# Patient Record
Sex: FEMALE | Race: WHITE | NOT HISPANIC OR LATINO | Employment: OTHER | URBAN - NONMETROPOLITAN AREA
[De-identification: names, ages, dates, MRNs, and addresses within clinical notes are randomized per-mention and may not be internally consistent; named-entity substitution may affect disease eponyms.]

---

## 2017-01-12 ENCOUNTER — COMMUNICATION - GICH (OUTPATIENT)
Dept: INTERNAL MEDICINE | Facility: OTHER | Age: 62
End: 2017-01-12

## 2017-01-12 DIAGNOSIS — I10 ESSENTIAL (PRIMARY) HYPERTENSION: ICD-10-CM

## 2017-05-23 ENCOUNTER — HISTORY (OUTPATIENT)
Dept: INTERNAL MEDICINE | Facility: OTHER | Age: 62
End: 2017-05-23

## 2017-05-23 ENCOUNTER — OFFICE VISIT - GICH (OUTPATIENT)
Dept: INTERNAL MEDICINE | Facility: OTHER | Age: 62
End: 2017-05-23

## 2017-05-23 ENCOUNTER — AMBULATORY - GICH (OUTPATIENT)
Dept: PEDIATRICS | Facility: OTHER | Age: 62
End: 2017-05-23

## 2017-05-23 DIAGNOSIS — M53.3 SACROCOCCYGEAL DISORDERS, NOT ELSEWHERE CLASSIFIED: ICD-10-CM

## 2017-05-23 DIAGNOSIS — E87.6 HYPOKALEMIA: ICD-10-CM

## 2017-05-23 DIAGNOSIS — I10 ESSENTIAL (PRIMARY) HYPERTENSION: ICD-10-CM

## 2017-05-23 DIAGNOSIS — Z78.0 ASYMPTOMATIC MENOPAUSAL STATE: ICD-10-CM

## 2017-05-23 DIAGNOSIS — F41.1 GENERALIZED ANXIETY DISORDER: ICD-10-CM

## 2017-05-23 DIAGNOSIS — G89.29 OTHER CHRONIC PAIN: ICD-10-CM

## 2017-05-23 LAB
ANION GAP - HISTORICAL: 4 (ref 5–18)
BUN SERPL-MCNC: 16 MG/DL (ref 7–25)
BUN/CREAT RATIO - HISTORICAL: 21
CALCIUM SERPL-MCNC: 9.5 MG/DL (ref 8.6–10.3)
CHLORIDE SERPLBLD-SCNC: 103 MMOL/L (ref 98–107)
CO2 SERPL-SCNC: 28 MMOL/L (ref 21–31)
CREAT SERPL-MCNC: 0.76 MG/DL (ref 0.7–1.3)
GFR IF NOT AFRICAN AMERICAN - HISTORICAL: >60 ML/MIN/1.73M2
GLUCOSE SERPL-MCNC: 84 MG/DL (ref 70–105)
POTASSIUM SERPL-SCNC: 3.3 MMOL/L (ref 3.5–5.1)
SODIUM SERPL-SCNC: 135 MMOL/L (ref 133–143)

## 2017-05-23 ASSESSMENT — ANXIETY QUESTIONNAIRES
2. NOT BEING ABLE TO STOP OR CONTROL WORRYING: NOT AT ALL
4. TROUBLE RELAXING: NOT AT ALL
6. BECOMING EASILY ANNOYED OR IRRITABLE: NOT AT ALL
GAD7 TOTAL SCORE: 0
3. WORRYING TOO MUCH ABOUT DIFFERENT THINGS: NOT AT ALL
1. FEELING NERVOUS, ANXIOUS, OR ON EDGE: NOT AT ALL
7. FEELING AFRAID AS IF SOMETHING AWFUL MIGHT HAPPEN: NOT AT ALL
5. BEING SO RESTLESS THAT IT IS HARD TO SIT STILL: NOT AT ALL

## 2017-05-23 ASSESSMENT — PATIENT HEALTH QUESTIONNAIRE - PHQ9: SUM OF ALL RESPONSES TO PHQ QUESTIONS 1-9: 0

## 2017-05-26 ENCOUNTER — AMBULATORY - GICH (OUTPATIENT)
Dept: ONCOLOGY | Facility: OTHER | Age: 62
End: 2017-05-26

## 2017-05-26 ENCOUNTER — HOSPITAL ENCOUNTER (OUTPATIENT)
Dept: RADIOLOGY | Facility: OTHER | Age: 62
End: 2017-05-26
Attending: INTERNAL MEDICINE

## 2017-05-26 ENCOUNTER — AMBULATORY - GICH (OUTPATIENT)
Dept: INTERNAL MEDICINE | Facility: OTHER | Age: 62
End: 2017-05-26

## 2017-05-26 DIAGNOSIS — H10.9 CONJUNCTIVITIS: ICD-10-CM

## 2017-05-26 DIAGNOSIS — Z78.0 ASYMPTOMATIC MENOPAUSAL STATE: ICD-10-CM

## 2017-05-31 ENCOUNTER — AMBULATORY - GICH (OUTPATIENT)
Dept: INTERNAL MEDICINE | Facility: OTHER | Age: 62
End: 2017-05-31

## 2017-06-05 ENCOUNTER — AMBULATORY - GICH (OUTPATIENT)
Dept: INTERNAL MEDICINE | Facility: OTHER | Age: 62
End: 2017-06-05

## 2017-06-05 DIAGNOSIS — M81.0 AGE-RELATED OSTEOPOROSIS WITHOUT CURRENT PATHOLOGICAL FRACTURE: ICD-10-CM

## 2017-06-06 ENCOUNTER — AMBULATORY - GICH (OUTPATIENT)
Dept: INTERNAL MEDICINE | Facility: OTHER | Age: 62
End: 2017-06-06

## 2017-06-07 ENCOUNTER — AMBULATORY - GICH (OUTPATIENT)
Dept: INTERNAL MEDICINE | Facility: OTHER | Age: 62
End: 2017-06-07

## 2017-06-13 ENCOUNTER — AMBULATORY - GICH (OUTPATIENT)
Dept: INTERNAL MEDICINE | Facility: OTHER | Age: 62
End: 2017-06-13

## 2017-06-20 ENCOUNTER — OFFICE VISIT - GICH (OUTPATIENT)
Dept: INTERNAL MEDICINE | Facility: OTHER | Age: 62
End: 2017-06-20

## 2017-06-20 ENCOUNTER — HISTORY (OUTPATIENT)
Dept: INTERNAL MEDICINE | Facility: OTHER | Age: 62
End: 2017-06-20

## 2017-06-20 DIAGNOSIS — F41.1 GENERALIZED ANXIETY DISORDER: ICD-10-CM

## 2017-06-20 DIAGNOSIS — M81.0 AGE-RELATED OSTEOPOROSIS WITHOUT CURRENT PATHOLOGICAL FRACTURE: ICD-10-CM

## 2017-06-20 ASSESSMENT — ANXIETY QUESTIONNAIRES
4. TROUBLE RELAXING: NOT AT ALL
GAD7 TOTAL SCORE: 0
5. BEING SO RESTLESS THAT IT IS HARD TO SIT STILL: NOT AT ALL
7. FEELING AFRAID AS IF SOMETHING AWFUL MIGHT HAPPEN: NOT AT ALL
2. NOT BEING ABLE TO STOP OR CONTROL WORRYING: NOT AT ALL
1. FEELING NERVOUS, ANXIOUS, OR ON EDGE: NOT AT ALL
6. BECOMING EASILY ANNOYED OR IRRITABLE: NOT AT ALL
3. WORRYING TOO MUCH ABOUT DIFFERENT THINGS: NOT AT ALL

## 2017-06-20 ASSESSMENT — PATIENT HEALTH QUESTIONNAIRE - PHQ9: SUM OF ALL RESPONSES TO PHQ QUESTIONS 1-9: 0

## 2017-06-28 ENCOUNTER — AMBULATORY - GICH (OUTPATIENT)
Dept: SCHEDULING | Facility: OTHER | Age: 62
End: 2017-06-28

## 2017-07-24 ENCOUNTER — COMMUNICATION - GICH (OUTPATIENT)
Dept: INTERNAL MEDICINE | Facility: OTHER | Age: 62
End: 2017-07-24

## 2017-07-28 ENCOUNTER — AMBULATORY - GICH (OUTPATIENT)
Dept: SCHEDULING | Facility: OTHER | Age: 62
End: 2017-07-28

## 2017-08-30 ENCOUNTER — AMBULATORY - GICH (OUTPATIENT)
Dept: INTERNAL MEDICINE | Facility: OTHER | Age: 62
End: 2017-08-30

## 2017-09-01 ENCOUNTER — AMBULATORY - GICH (OUTPATIENT)
Dept: INTERNAL MEDICINE | Facility: OTHER | Age: 62
End: 2017-09-01

## 2017-12-07 ENCOUNTER — COMMUNICATION - GICH (OUTPATIENT)
Dept: INTERNAL MEDICINE | Facility: OTHER | Age: 62
End: 2017-12-07

## 2017-12-07 ENCOUNTER — HISTORY (OUTPATIENT)
Dept: INTERNAL MEDICINE | Facility: OTHER | Age: 62
End: 2017-12-07

## 2017-12-07 ENCOUNTER — OFFICE VISIT - GICH (OUTPATIENT)
Dept: INTERNAL MEDICINE | Facility: OTHER | Age: 62
End: 2017-12-07

## 2017-12-07 DIAGNOSIS — I10 ESSENTIAL (PRIMARY) HYPERTENSION: ICD-10-CM

## 2017-12-07 DIAGNOSIS — M81.0 AGE-RELATED OSTEOPOROSIS WITHOUT CURRENT PATHOLOGICAL FRACTURE: ICD-10-CM

## 2017-12-07 DIAGNOSIS — E78.2 MIXED HYPERLIPIDEMIA: ICD-10-CM

## 2017-12-07 DIAGNOSIS — F41.9 ANXIETY DISORDER: ICD-10-CM

## 2017-12-07 LAB
ABSOLUTE BASOPHILS - HISTORICAL: 0 THOU/CU MM
ABSOLUTE EOSINOPHILS - HISTORICAL: 0.1 THOU/CU MM
ABSOLUTE IMMATURE GRANULOCYTES(METAS,MYELOS,PROS) - HISTORICAL: 0 THOU/CU MM
ABSOLUTE LYMPHOCYTES - HISTORICAL: 2.7 THOU/CU MM (ref 0.9–2.9)
ABSOLUTE MONOCYTES - HISTORICAL: 0.5 THOU/CU MM
ABSOLUTE NEUTROPHILS - HISTORICAL: 2.9 THOU/CU MM (ref 1.7–7)
ANION GAP - HISTORICAL: 10 (ref 5–18)
BASOPHILS # BLD AUTO: 0.6 %
BUN SERPL-MCNC: 17 MG/DL (ref 7–25)
BUN/CREAT RATIO - HISTORICAL: 22
CALCIUM SERPL-MCNC: 9.1 MG/DL (ref 8.6–10.3)
CHLORIDE SERPLBLD-SCNC: 104 MMOL/L (ref 98–107)
CO2 SERPL-SCNC: 27 MMOL/L (ref 21–31)
CREAT SERPL-MCNC: 0.78 MG/DL (ref 0.7–1.3)
EOSINOPHIL NFR BLD AUTO: 1.4 %
ERYTHROCYTE [DISTWIDTH] IN BLOOD BY AUTOMATED COUNT: 13.6 % (ref 11.5–15.5)
GFR IF NOT AFRICAN AMERICAN - HISTORICAL: >60 ML/MIN/1.73M2
GLUCOSE SERPL-MCNC: 89 MG/DL (ref 70–105)
HCT VFR BLD AUTO: 37.9 % (ref 33–51)
HEMOGLOBIN: 12.5 G/DL (ref 12–16)
IMMATURE GRANULOCYTES(METAS,MYELOS,PROS) - HISTORICAL: 0.2 %
LYMPHOCYTES NFR BLD AUTO: 43.7 % (ref 20–44)
MAGNESIUM SERPL-MCNC: 1.7 MG/DL (ref 1.9–2.7)
MCH RBC QN AUTO: 29 PG (ref 26–34)
MCHC RBC AUTO-ENTMCNC: 33 G/DL (ref 32–36)
MCV RBC AUTO: 88 FL (ref 80–100)
MONOCYTES NFR BLD AUTO: 7.4 %
NEUTROPHILS NFR BLD AUTO: 46.7 % (ref 42–72)
PHOSPHATE SERPL-MCNC: 3.9 MG/DL (ref 2.5–5)
PLATELET # BLD AUTO: 251 THOU/CU MM (ref 140–440)
PMV BLD: 9.5 FL (ref 6.5–11)
POTASSIUM SERPL-SCNC: 4.2 MMOL/L (ref 3.5–5.1)
RED BLOOD COUNT - HISTORICAL: 4.31 MIL/CU MM (ref 4–5.2)
SODIUM SERPL-SCNC: 141 MMOL/L (ref 133–143)
VITAMIN D TOTAL - HISTORICAL: 39.6 NG/ML
WHITE BLOOD COUNT - HISTORICAL: 6.2 THOU/CU MM (ref 4.5–11)

## 2017-12-07 ASSESSMENT — ANXIETY QUESTIONNAIRES
5. BEING SO RESTLESS THAT IT IS HARD TO SIT STILL: NOT AT ALL
1. FEELING NERVOUS, ANXIOUS, OR ON EDGE: NOT AT ALL
GAD7 TOTAL SCORE: 0
6. BECOMING EASILY ANNOYED OR IRRITABLE: NOT AT ALL
3. WORRYING TOO MUCH ABOUT DIFFERENT THINGS: NOT AT ALL
7. FEELING AFRAID AS IF SOMETHING AWFUL MIGHT HAPPEN: NOT AT ALL
2. NOT BEING ABLE TO STOP OR CONTROL WORRYING: NOT AT ALL
4. TROUBLE RELAXING: NOT AT ALL

## 2017-12-07 ASSESSMENT — PATIENT HEALTH QUESTIONNAIRE - PHQ9: SUM OF ALL RESPONSES TO PHQ QUESTIONS 1-9: 0

## 2017-12-11 ENCOUNTER — AMBULATORY - GICH (OUTPATIENT)
Dept: FAMILY MEDICINE | Facility: OTHER | Age: 62
End: 2017-12-11

## 2017-12-28 NOTE — TELEPHONE ENCOUNTER
Patient Information     Patient Name MRN Sex Eloisa Cruz [id number] Female 1955      Telephone Encounter by Marianna Krause at 2017 10:28 AM     Author:  Marianna Krause Service:  (none) Author Type:  (none)     Filed:  2017 10:30 AM Encounter Date:  2017 Status:  Signed     :  Marianna Krause            PT CALLED AND SHE NO LONGER NEEDS THE REFERRAL. DO NOT HAVE TO CALL PATIENT BACK.

## 2017-12-28 NOTE — TELEPHONE ENCOUNTER
Patient Information     Patient Name MRN Eloisa Martell [id number] Female 1955      Telephone Encounter by Sangeetha Schaeffer at 2017  8:36 AM     Author:  Sangeetha Schaeffer Service:  (none) Author Type:  (none)     Filed:  2017  8:36 AM Encounter Date:  2017 Status:  Signed     :  Sangeetha Schaeffer LPN        2017 8:36 AM

## 2017-12-28 NOTE — PROGRESS NOTES
Patient Information     Patient Name MRN Sex Eloisa Cruz [id number] Female 1955      Progress Notes by Chris Fitch MD at 2017  9:00 AM     Author:  Chris Fitch MD Service:  (none) Author Type:  Physician     Filed:  2017  7:56 PM Encounter Date:  2017 Status:  Signed     :  Chris Fitch MD (Physician)            Nursing Notes:   Sangeetha Schaeffer  2017  9:02 AM  Signed  Patient presents to the clinic for follow up with osteoporosis.    Sangeetha Schaeffer LPN        2017 8:47 AM    Eloisa Del Rosario presents to clinic today for:   Chief Complaint    Patient presents with      Follow Up     HPI: Ms. Del Rosario is a 61 y.o. female who presents today for evaluation of above.     (M81.0) Age-related osteoporosis without current pathological fracture  (primary encounter diagnosis)  (F41.1) ANXIETY DISORDER     Patient presented today to discuss treatment options of her newly diagnosed osteoporosis.  She has severe anxiety issues as well as medication intolerance problems.  We talked about some treatment options, she would like to evaluate the costs and side effects of Fosamax versus Forteo versus Prolia injections.  She does report having a lot of heartburn issues and even has intolerance to taking oral potassium pills.  Advised that Fosamax probably isn't a great idea.  She is not taking any calcium.  She has limited calcium intake.  She does eat some yogurt but only a small amount.  Advised that she start some vitamin D capsules.  Prescription sent to pharmacy.    Given her medication side effects concerns, recommend she look at Prolia injections twice yearly.    Not taking Calcium or Vit D. + problems from potassium tablets --> heartburn.     Ms. Del Rosario's There is no height or weight on file to calculate BMI. This is out of the normal range for a 61 y.o. Normal range for ages 18+ is between 18.5 and 24.9. To lose weight we reviewed risks and benefits of appropriate  options such as diet, exercise, and medications. Patient's strategy will be  self-directed nutrition plan and self-directed exercise program   BP Readings from Last 1 Encounters:06/20/17 : 128/78  Ms. Araya blood pressure is out of the normal range for adults. Per JNC-8 guidelines normal adult blood pressure is < 120/80, pre-hypertensive is between 120/80 and 139/89, and hypertension is 140/90 or greater. Risks of hypertension were discussed. Patient's strategy will be weight loss, increased activity and reduced salt intake    Functional Capacity: > 4 METS.   Reports that she can climb a flight of stairs without any chest pain/heaviness or shortness of breath.   Patient reports no current symptoms of fevers, chills, nausea/vomiting.   No cough. No shortness of breath.   No change in bowel/bladder habits. No melena, hematochezia. No Hematuria.   No rashes. No palpitations.  No orthopnea/paroxysmal nocturnal dyspnea   No vision or hearing issues.   No significant mood issues   No bruising.     LAKIA:  LAKIA-7 ANXIETY SCREENING 5/23/2017 6/20/2017   LAKIA date (doc flow) 5/23/2017 6/20/2017   Nervous, anxious 0 0   Cannot stop worrying 0 0   Worry about different things 0 0   Cannot relax 0 0   Feeling restless 0 0   Easily annoyed/irritated 0 0   Afraid of awful event 0 0   Score 0 0   Severity none none       PHQ9:  PHQ Depression Screening 5/23/2017 6/20/2017   Date of PHQ exam (doc flow) 5/23/2017 6/20/2017   1. Lack of interest/pleasure 0 - Not at all 0 - Not at all   2. Feeling down/depressed 0 - Not at all 0 - Not at all   PHQ-2 TOTAL SCORE 0 0   3. Trouble sleeping 0 - Not at all 0 - Not at all   4. Decreased energy 0 - Not at all 0 - Not at all   5. Appetite change 0 - Not at all 0 - Not at all   6. Feelings of failure 0 - Not at all 0 - Not at all   7. Trouble concentrating 0 - Not at all 0 - Not at all   8. Activity level 0 - Not at all 0 - Not at all   9. Hurting yourself 0 - Not at all 0 - Not at all   PHQ-9  TOTAL SCORE 0 0   PHQ-9 Severity Level none none   Functional Impairment not difficult at all not difficult at all        I have personally reviewed the past medical history, past surgical history, medications, allergies, family and social history as listed below, on 6/20/2017.    Patient Active Problem List       Diagnosis  Date Noted     Age-related osteoporosis without current pathological fracture  06/20/2017     Sciatica of left side  05/23/2017     Chronic left SI joint pain  05/23/2017     Menopausal state  05/23/2017     Herpes zoster without complication  12/11/2015     Neuralgia, post-herpetic  12/11/2015     Vasovagal near-syncope  05/13/2015     Hyperlipidemia  01/13/2015     S/P radiation therapy for Cervical Cancer - Treated at [facility] - about 1992 06/04/2014     Lymphedema - Bilateral LE - due to lymph node removal - [facility] - about 1992 06/04/2014     FHx: diabetes mellitus  06/04/2014     Colonoscopy refused  06/04/2014     Mammogram declined  06/04/2014     FOREIGN BODY, FOOT  01/11/2012     CERVICAL CANCER, HX OF       History of cervical cancer with radiotherapy and cesium implants          Hypertension                 ANXIETY DISORDER       Social anxiety disorder          HELICOBACTER PYLORI INFECTION, HX OF       History of positive H. 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 retroperitoneal lymph node dissection      Past Surgical History:      Procedure  Laterality Date     COLONOSCOPY SCREENING  1991     LAPAROTOMY      Exploratory lap.retroperitoneal lymph node dissection       SALPINGO-OOPHORECTOMY      Removal of left tube and ovary       Current Outpatient Prescriptions        Medication  Sig Dispense Refill     acetaminophen SR (TYLENOL ARTHRITIS) 650 mg Extended-Release tablet Take 1 tablet by mouth every 8 hours if needed for Pain. Max acetaminophen dose: 4000mg in 24 hrs. 500 tablet 0     Blood Pressure Test Kit-Medium kit As directed. Dispense what insurance will cover 1 Each 0     cholecalciferol (VITAMIN D3) 5,000 unit capsule Take 1 capsule by mouth once daily. For Vitamin D Deficiency 100 capsule 3     Comp.Stocking,Thigh,Long,Med. misc As directed. 99 months 2 Package 0     denosumab (PROLIA) 60 mg/mL injection Inject 1 mL subcutaneous EVERY 6 MONTHS. For Osteoporosis - check on cost/coverage vs fosamax 1 mL 1     lisinopril-hydrochlorothiazide (10-12.5 mg) tablet (PRINZIDE; ZESTORETIC) Take 0.5 tablets by mouth once daily. -- For Blood Pressure - goal /80 45 tablet 3     Allergies     Allergen  Reactions     Erythromycin Shortness Of Breath, Rash and Itching     Family History       Problem   Relation Age of Onset     Heart Disease  Father       at 57       Diabetes  Mother      type II DM       Other  Mother       at 89~hip fracture       Heart Disease  Brother       at 56       Diabetes  Sister      Type II DM -  at about 73       Thyroid Disease  Sister      Family Status     Relation  Status     Son Alive     Daughter Alive     Daughter Alive     Father  at age 57    CAD -  of MI      Mother  at age 89      Brother  at age 56    CAD -  of MI      Sister      Sister Alive     Brother Alive     Brother Alive     Brother Alive     Brother Alive     Brother      Sister      Sister      Social History     Social History        Marital status:       Spouse name: N/A     Number of children:  3     Years of education:  N/A     Social History Main Topics        Smoking status:  Former Smoker     Packs/day: 0.25     Years: 15.00     Types: Cigarettes     Quit date: 1992     Smokeless tobacco:  Never Used      "Alcohol use  No     Drug use:  No     Sexual activity:  Not on file     Other Topics  Concern     Not on file      Social History Narrative     Siblings Six brothers, 2 sisters    Children Rubina Sanchez, BRUNILDA    Worked at [employer] - now has not worked for a while.      Lives by herself - Spouse  MVA age 23.  Has 3 children.    Previously smoked, one pack daily - quit about .    No regular exercise program.     Works in the garden, walks with daughter intermittently.     Previous insurance issues - no med coverage - stopped HTN medications intermittently in the past.          Pertinent ROS was performed and was negative as noted in HPI above.     EXAM:   Vitals:     17 0848   BP: 128/78   Pulse: 60   Temp: 98.1  F (36.7  C)   TempSrc: Tympanic     BP Readings from Last 3 Encounters:    17 128/78   17 134/80   16 158/90     Wt Readings from Last 3 Encounters:    17 75.4 kg (166 lb 2 oz)   16 74.4 kg (164 lb)   16 73.7 kg (162 lb 6 oz)     Estimated body mass index is 27.43 kg/(m^2) as calculated from the following:    Height as of 17: 1.657 m (5' 5.25\").    Weight as of 17: 75.4 kg (166 lb 2 oz).     EXAM:  Constitutional: Pleasant, alert, appropriate appearance for age. No acute distress  Lymphatic Exam: Non-palpable nodes in neck, clavicular regions  Pulmonary: Lungs are clear to auscultation bilaterally, without wheezes or crackles  Cardiovascular Exam: regular rate and rhythm, trace+ pedal edema present -- wearing compression stockings.   Integument: No abnormal rashes, sores, or ulcerations noted  Neurologic Exam: CN 3-12 grossly intact   Musculoskeletal Exam: Moves upper and lower extremities symmetrically, No focal weakness  Gait and station appear grossly normal  Psychiatric Exam: Awake and Alert, Affect and mood appropriate  Speech is fluent, Thought process is normal    INVESTIGATIONS:  Results for orders placed or performed in visit on " 05/23/17      BASIC METABOLIC PANEL      Result  Value Ref Range    SODIUM 135 133 - 143 mmol/L    POTASSIUM 3.3 (L) 3.5 - 5.1 mmol/L    CHLORIDE 103 98 - 107 mmol/L    CO2,TOTAL 28 21 - 31 mmol/L    ANION GAP 4 (L) 5 - 18                    GLUCOSE 84 70 - 105 mg/dL    CALCIUM 9.5 8.6 - 10.3 mg/dL    BUN 16 7 - 25 mg/dL    CREATININE 0.76 0.70 - 1.30 mg/dL    BUN/CREAT RATIO           21                    GFR if African American >60 >60 ml/min/1.73m2    GFR if not African American >60 >60 ml/min/1.73m2       ASSESSMENT AND PLAN:  Eloisa was seen today for follow up.    Diagnoses and all orders for this visit:    Age-related osteoporosis without current pathological fracture  -     AMB CONSULT TO ENDOCRINOLOGY; Future  -     denosumab (PROLIA) 60 mg/mL injection; Inject 1 mL subcutaneous EVERY 6 MONTHS. For Osteoporosis - check on cost/coverage vs fosamax  -     cholecalciferol (VITAMIN D3) 5,000 unit capsule; Take 1 capsule by mouth once daily. For Vitamin D Deficiency    ANXIETY DISORDER      lab results and schedule of future lab studies reviewed with patient, reviewed diet, exercise and weight control    -- Expected clinical course discussed   -- Medications and their side effects discussed    25 minutes spent in face-to-face interaction with patient with greater than 50% spent in counseling and care coordination of listed medical problems.      Eloisa is also recommended to eat a heart-healthy diet, do regular aerobic exercises, maintain a desirable body weight, and avoid tobacco products. These recommendations are from the American Heart Association (AHA) which stresses the importance of lifestyle changes to lower cardiovascular disease risk.     Return if symptoms worsen or fail to improve.    Patient Instructions   1. Age-related osteoporosis without current pathological fracture    5/26/2017 -- Procedure:  XR DXA BONE DENSITY 2 SITES AXIAL     Clinical History:  Asymptomatic menopausal  state.     Comparison:  None.     Interpretation:  Osteoporosis     Lowest Category Site:  Lumbar spine     Lowest Category T-Score:  -3.9     Lowest Category % Change from most recent:  No prior   %     FRAX Score at hip is:  2.5   %     FRAX Score for major osteoporotic fracture is:  22.1   %        - AMB CONSULT TO ENDOCRINOLOGY -- hormone/bone specialist  - they will call with date/time of appointment.        - denosumab (PROLIA) 60 mg/mL injection; Inject 1 mL subcutaneous EVERY 6 MONTHS. For Osteoporosis  -- Rx printed.     - check on cost/coverage vs fosamax --- VS once daily Forteo.     + with chronic heartburn issues -- Fosamax may be a bad idea.       No results found for: [id number]     Vitamin D3 is a safe and effective product for the treatment and prevention of osteoporosis, rickets and vitamin D deficiency. Most people living in this part of the northern hemisphere are vitamin D deficient.     -- Start taking 5000 IU vitamin D3 daily with food as it is a fat soluble vitamin.   -- We can recheck this level in about 3 to 4 months    Vitamin D3 is associated with improved immunity, improved bone health, improved mood and in some people a reduction in pain severity.     Return as needed for follow-up with Dr. Fitch.    Clinic : [phone]  Appointment line: [phone]      Chris Fitch MD

## 2017-12-28 NOTE — TELEPHONE ENCOUNTER
Patient Information     Patient Name MRN Sex Eloisa Cruz [id number] Female 1955      Telephone Encounter by Eloisa Mejía at 2017  8:49 AM     Author:  Eloisa Mejía  Service:  (none) Author Type:  (none)     Filed:  2017 10:28 AM  Encounter Date:  2017 Status:  Addendum     :  Marianna Krause        Related Notes: Original Note by Eloisa Mejía filed at 2017  8:50 AM            PT WOULD LIKE TO SPEAK WITH NURSE REGARDING REFERRAL

## 2017-12-28 NOTE — TELEPHONE ENCOUNTER
Patient Information     Patient Name MRN Sex Eloisa Cruz [id number] Female 1955      Telephone Encounter by Sangeetha Schaeffer at 2017  3:36 PM     Author:  Sangeetha Schaeffer Service:  (none) Author Type:  (none)     Filed:  2017  3:36 PM Encounter Date:  2017 Status:  Signed     :  Sangeetha Schaeffer            Procedure:  XR DXA BONE DENSITY 2 SITES AXIAL     Clinical History:  Asymptomatic menopausal state.     Comparison:  None.     Interpretation:  Osteoporosis     Lowest Category Site:  Lumbar spine     Lowest Category T-Score:  -3.9     Lowest Category % Change from most recent:  No prior   %     FRAX Score at hip is:  2.5   %     FRAX Score for major osteoporotic fracture is:  22.1   %                 Only the lowest category is reported for each patient per guidelines of the International Society for Clinical Densitometry.     See attached DXA images and FRAX report for further details.     WHO DIAGNOSTIC GUIDELINES FOR BONE MASS MEASUREMENT:     Normal                        T-Score at or above -1.0     Osteopenia                T-Score between -1.0 and -2.5     Osteoporosis            T-Score at or below -2.5     Providers should consider medical therapies when 10 year probability of hip fracture is greater than or equal to 3%, or 10 year probability of major osteoporosis related fracture is greater than or equal to 20%.     ** Exam performed on GE Lunar Prodigy Advance        ** Exam performed on GE Lunar Prodigy Advance     Electronically Signed By: Duane De Luna M.D. on 2017 2:57 PM

## 2017-12-28 NOTE — PATIENT INSTRUCTIONS
Patient Information     Patient Name MRN Eloisa Martell [id number] Female 1955      Patient Instructions by Chris Fitch MD at 2017  9:00 AM     Author:  Chris Fitch MD  Service:  (none) Author Type:  Physician     Filed:  2017  9:16 AM  Encounter Date:  2017 Status:  Addendum     :  Chris Fitch MD (Physician)        Related Notes: Original Note by Chris Fitch MD (Physician) filed at 2017  9:13 AM            1. Age-related osteoporosis without current pathological fracture    2017 -- Procedure:  XR DXA BONE DENSITY 2 SITES AXIAL     Clinical History:  Asymptomatic menopausal state.     Comparison:  None.     Interpretation:  Osteoporosis     Lowest Category Site:  Lumbar spine     Lowest Category T-Score:  -3.9     Lowest Category % Change from most recent:  No prior   %     FRAX Score at hip is:  2.5   %     FRAX Score for major osteoporotic fracture is:  22.1   %        - AMB CONSULT TO ENDOCRINOLOGY -- hormone/bone specialist  - they will call with date/time of appointment.        - denosumab (PROLIA) 60 mg/mL injection; Inject 1 mL subcutaneous EVERY 6 MONTHS. For Osteoporosis  -- Rx printed.     - check on cost/coverage vs fosamax --- VS once daily Forteo.     + with chronic heartburn issues -- Fosamax may be a bad idea.       No results found for: [id number]     Vitamin D3 is a safe and effective product for the treatment and prevention of osteoporosis, rickets and vitamin D deficiency. Most people living in this part of the northern [place] are vitamin D deficient.     -- Start taking 5000 IU vitamin D3 daily with food as it is a fat soluble vitamin.   -- We can recheck this level in about 3 to 4 months    Vitamin D3 is associated with improved immunity, improved bone health, improved mood and in some people a reduction in pain severity.     Return as needed for follow-up with Dr. Fitch.    Clinic :  [phone]  Appointment line: [phone]

## 2017-12-30 NOTE — NURSING NOTE
Patient Information     Patient Name MRN Eloisa Martell [id number] Female 1955      Nursing Note by Sangeetha Schaeffer at 2017  9:00 AM     Author:  Sangeetha Schaeffer Service:  (none) Author Type:  (none)     Filed:  2017  9:02 AM Encounter Date:  2017 Status:  Signed     :  Sangeetha Schaeffer            Patient presents to the clinic for follow up with osteoporosis.    Sangeetha Schaeffer LPN        2017 8:47 AM

## 2018-01-02 NOTE — TELEPHONE ENCOUNTER
Patient Information     Patient Name MRN Eloisa Martell [id number] Female 1955      Telephone Encounter by Karla De Luna RN at 2017  9:11 AM     Author:  Karla De Luna RN Service:  (none) Author Type:  NURS- Registered Nurse     Filed:  2017  9:12 AM Encounter Date:  2017 Status:  Signed     :  Karla De Luna RN (NURS- Registered Nurse)            Refilled on 16 #90 x 3 refills  Unable to complete prescription refill per RN Medication Refill Policy.................... KARLA DE LUNA RN ....................  2017   9:11 AM

## 2018-01-05 NOTE — NURSING NOTE
Patient Information     Patient Name MRN Sex Eloisa Cruz [id number] Female 1955      Nursing Note by Sangeetha Schaeffer at 2017  9:40 AM     Author:  Sangeetha Schaeffer Service:  (none) Author Type:  (none)     Filed:  2017 10:12 AM Encounter Date:  2017 Status:  Signed     :  Sangeetha Schaeffer            Patient presents to the clinic for potassium level to be checked per her pharmacist.    Sangeetha Schaeffer LPN        2017 9:44 AM

## 2018-01-05 NOTE — PATIENT INSTRUCTIONS
Patient Information     Patient Name MRN Eloisa Martell [id number] Female 1955      Patient Instructions by Chris Fitch MD at 2017  9:40 AM     Author:  Chris Fitch MD  Service:  (none) Author Type:  Physician     Filed:  2017 10:22 AM  Encounter Date:  2017 Status:  Addendum     :  Chris Fitch MD (Physician)        Related Notes: Original Note by Chris Fitch MD (Physician) filed at 2017 10:19 AM            Blood pressure is well controlled.     Check labs today.     POTASSIUM (mmol/L)    Date Value   2016 3.7      See printed information on sacroiliac joint pain.  -- If the home exercise program does not really help, consider physical therapy referral, x-rays, steroid injection    DEXA Scan ordered.  - they will call with date/time of appointment.      Medication list updated.    Blood pressures look very good.  Continue half tablet of your Prinzide daily.      [place] counselors/therapists.    [facility] - has open access/walk-ins available:  Monday 1 PM to 3:30 PM  Tuesday 8:30 AM until 11:30 AM  Wednesday 12:30 PM until 3:30 PM  Thursday 8:30 AM until 11:30 AM     Telephone  Hrs  Kids Address    [facility]   (Many counselors)  [phone]  M-Th 8-5   F 8-12  Yes  [address] [place] [address]   [email or web address]    [facility]   (Many counselors)  [phone]   Yes  [address] [facility] [address]   [email or web address]    [facility]   (Many counselors)  [phone]   Yes  [address]   [email or web address]    [facility]   Sheldon Mendez  [phone]   Yes  [address] [facility] [address]   [email or web address]    [facility]   Frank Gallegos  [phone]       Garry Anaya  [phone]    [address]    Ana Narvaez  [phone]    [address]  [phone]    [address] [place] [address]     Alicia De Luna  [phone]    [address]    Dontrell Teofilo  [phone]    [address] [place]     Mina Otis  [phone]    [address]    Jody Fitzgerald  [phone]    [address] [place] [address]   [email or web address]    Riana Russo  [phone]    [address] [place] [address] [address]   ([facility])   [email or web address]              Return in approximately 1 year, or sooner as needed for follow-up with Dr. Fitch.  - Follow-up blood pressure, annual follow-up    Clinic : [phone]  Appointment line: [phone]

## 2018-01-05 NOTE — PROGRESS NOTES
Patient Information     Patient Name MRN Sex     Eloisa Del Rosario [id number] Female 1955      Progress Notes by Chris Fitch MD at 2017  9:40 AM     Author:  Chris Fitch MD Service:  (none) Author Type:  Physician     Filed:  2017 12:15 PM Encounter Date:  2017 Status:  Signed     :  Chris Fitch MD (Physician)            Nursing Notes:   Sangeetha Schaeffer  2017 10:12 AM  Signed  Patient presents to the clinic for potassium level to be checked per her pharmacist.    Sangeetha Schaeffer LPN        2017 9:44 AM    Eloisa Del Rosario presents to clinic today for:   Chief Complaint    Patient presents with      Lab     HPI: Ms. Del Rosario is a 61 y.o. female who presents today for evaluation of above.     (I10) Hypertension  (primary encounter diagnosis)  (M53.3,  G89.29) Chronic left SI joint pain  (Z78.0) Asymptomatic menopausal state   (F41.1) ANXIETY DISORDER  (E87.6) Hypokalemia     Hypertension, currently well controlled.  Actually got lightheaded issues when taking a full tablet of her lisinopril-HCTZ.  Has only been taking half tablet daily and doing much better with this.  Her pharmacist advised that she have her potassium rechecked.  Potassium came back low today.  Start oral replacement.  Prescription sent to pharmacy.    Chronic low back pain, initially right-sided than left-sided.  Exam today shows SI joint pain.  She thought maybe some sciatica with minimal radicular symptoms down the leg at times.  Seems to only with him to the buttocks however.  We discussed some treatment options.  Discussed x-rays, therapy, steroid injections.  She would like to start some home exercises.  Paperwork printed and reviewed with her today.    Postmenopausal, due for DEXA scan.  Orders placed.    + chronic anxiety - declines meds. Consider counseling.  Information provided.    + leg swelling - stable, wearing stockings.  Reports that she is doing well with her compression stockings.   She wears them daily.    Ms. Floress Body mass index is 27.43 kg/(m^2). This is out of the normal range for a 61 y.o. Normal range for ages 18+ is between 18.5 and 24.9. To lose weight we reviewed risks and benefits of appropriate options such as diet, exercise, and medications. Patient's strategy will be  self-directed nutrition plan and self-directed exercise program   BP Readings from Last 1 Encounters:05/23/17 : 134/80  Ms. Araya blood pressure is out of the normal range for adults. Per JNC-8 guidelines normal adult blood pressure is < 120/80, pre-hypertensive is between 120/80 and 139/89, and hypertension is 140/90 or greater. Risks of hypertension were discussed. Patient's strategy will be weight loss, increased activity and reduced salt intake    Functional Capacity: > 4 METS.   Reports that she can climb a flight of stairs without any chest pain/heaviness or shortness of breath.   Patient reports no current symptoms of fevers, chills, nausea/vomiting.   No cough. No shortness of breath.   No change in bowel/bladder habits. No melena, hematochezia. No Hematuria.   No rashes. No palpitations.  No orthopnea/paroxysmal nocturnal dyspnea   No vision or hearing issues.   + chronic anxiety.   No bruising.     LAKIA:  LAKIA-7 ANXIETY SCREENING 5/23/2017   LAKIA date (doc flow) 5/23/2017   Nervous, anxious 0   Cannot stop worrying 0   Worry about different things 0   Cannot relax 0   Feeling restless 0   Easily annoyed/irritated 0   Afraid of awful event 0   Score 0   Severity none       PHQ9:  PHQ Depression Screening 12/8/2016 5/23/2017   Date of PHQ exam (doc flow) 12/8/2016 5/23/2017   1. Lack of interest/pleasure 0 - Not at all 0 - Not at all   2. Feeling down/depressed 0 - Not at all 0 - Not at all   PHQ-2 TOTAL SCORE 0 0   3. Trouble sleeping - 0 - Not at all   4. Decreased energy - 0 - Not at all   5. Appetite change - 0 - Not at all   6. Feelings of failure - 0 - Not at all   7. Trouble concentrating - 0 - Not  at all   8. Activity level - 0 - Not at all   9. Hurting yourself - 0 - Not at all   PHQ-9 TOTAL SCORE - 0   PHQ-9 Severity Level - none   Functional Impairment - not difficult at all        I have personally reviewed the past medical history, past surgical history, medications, allergies, family and social history as listed below, on 5/23/2017.    Patient Active Problem List       Diagnosis  Date Noted     Sciatica of left side  05/23/2017     Chronic left SI joint pain  05/23/2017     Menopausal state  05/23/2017     Herpes zoster without complication  12/11/2015     Neuralgia, post-herpetic  12/11/2015     Vasovagal near-syncope  05/13/2015     Hyperlipidemia  01/13/2015     S/P radiation therapy for Cervical Cancer - Treated at [facility] - about 1992 06/04/2014     Lymphedema - Bilateral LE - due to lymph node removal - [facility] - about 1992 06/04/2014     FHx: diabetes mellitus  06/04/2014     Colonoscopy refused  06/04/2014     Mammogram declined  06/04/2014     FOREIGN BODY, FOOT  01/11/2012     CERVICAL CANCER, HX OF       History of cervical cancer with radiotherapy and cesium implants          Hypertension                 ANXIETY DISORDER       Social anxiety disorder          HELICOBACTER PYLORI INFECTION, HX OF       History of positive H. 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 retroperitoneal lymph node dissection      Past Surgical History:      Procedure  Laterality Date     COLONOSCOPY SCREENING  1991     LAPAROTOMY      Exploratory lap.retroperitoneal lymph node dissection       SALPINGO-OOPHORECTOMY      Removal of left tube and ovary       Current Outpatient Prescriptions       Medication   Sig Dispense Refill     acetaminophen SR (TYLENOL ARTHRITIS) 650 mg Extended-Release tablet Take 1 tablet by mouth every 8 hours if needed for Pain. Max acetaminophen dose: 4000mg in 24 hrs. 500 tablet 0     Blood Pressure Test Kit-Medium kit As directed. Dispense what insurance will cover 1 Each 0     Comp.Stocking,Thigh,Long,Med. misc As directed. 99 months 2 Package 0     lisinopril-hydrochlorothiazide (10-12.5 mg) tablet (PRINZIDE; ZESTORETIC) Take 0.5 tablets by mouth once daily. -- For Blood Pressure - goal /80 45 tablet 3     potassium chloride (KLOR-CON 10; K-TAB) 10 mEq Controlled-Release tablet Take 2 tablets by mouth once daily with a meal. For Potassium Replacement -- Please notify patient of new prescription -- 180 tablet 3     Allergies     Allergen  Reactions     Erythromycin Shortness Of Breath, Rash and Itching     Family History       Problem   Relation Age of Onset     Heart Disease  Father       at 57       Diabetes  Mother      type II DM       Other  Mother       at 89~hip fracture       Heart Disease  Brother       at 56       Diabetes  Sister      Type II DM -  at about 73       Thyroid Disease  Sister      Family Status     Relation  Status     Son Alive     Daughter Alive     Daughter Alive     Father  at age 57    CAD -  of MI      Mother  at age 89      Brother  at age 56    CAD -  of MI      Sister      Sister Alive     Brother Alive     Brother Alive     Brother Alive     Brother Alive     Brother      Sister      Sister      Social History     Social History        Marital status:       Spouse name: N/A     Number of children:  3     Years of education:  N/A     Social History Main Topics        Smoking status:  Former Smoker     Packs/day: 0.25     Years: 15.00     Types: Cigarettes     Quit date: 1992     Smokeless tobacco:  Never Used     Alcohol use  No     Drug use:  No     Sexual activity:  Not on file  "    Other Topics  Concern     Not on file      Social History Narrative     Siblings Six brothers, 2 sisters    Children Rubina Sanchez TJ    Worked at [employer] - now has not worked for a while.      Lives by herself - Spouse  MVA age 23.  Has 3 children.    Previously smoked, one pack daily - quit about .    No regular exercise program.     Works in the garden, walks with daughter intermittently.     Previous insurance issues - no med coverage - stopped HTN medications intermittently in the past.          Pertinent ROS was performed and was negative as noted in HPI above.     EXAM:   Vitals:     17 0951   BP: 134/80   Pulse: 56   Temp: 98.9  F (37.2  C)   TempSrc: Tympanic   Weight: 75.4 kg (166 lb 2 oz)   Height: 1.657 m (5' 5.25\")     BP Readings from Last 3 Encounters:    17 134/80   16 158/90   16 138/86     Wt Readings from Last 3 Encounters:    17 75.4 kg (166 lb 2 oz)   16 74.4 kg (164 lb)   16 73.7 kg (162 lb 6 oz)     Estimated body mass index is 27.43 kg/(m^2) as calculated from the following:    Height as of this encounter: 1.657 m (5' 5.25\").    Weight as of this encounter: 75.4 kg (166 lb 2 oz).     EXAM:  Constitutional: Pleasant, alert, appropriate appearance for age. No acute distress  ENT: Normocephalic, Atraumatic, Thyroid without nodules or tenderness   Nose/Mouth: Oral pharynx without erythema or exudates, Nose is patent bilaterally, no rhinorrhea and Dental hygeine adequate   Eyes:  Extraocular muscles intact, Sclera non-icteric  Lymphatic Exam: Non-palpable nodes in neck, clavicular regions  Pulmonary: Lungs are clear to auscultation bilaterally, without wheezes or crackles  Cardiovascular Exam: regular rate and rhythm, 1+ pedal edema present  Gastrointestinal Exam: Soft, non-tender, non-distended, positive bowel sounds  Integument: No abnormal rashes, sores, or ulcerations noted  Neurologic Exam: CN 3-12 grossly intact "   Musculoskeletal Exam: Moves upper and lower extremities symmetrically, No focal weakness  Gait and station appear grossly normal  Psychiatric Exam: + chronic anxiety.   Awake and Alert, Affect and mood appropriate  Speech is fluent, Thought process is normal    INVESTIGATIONS:  Results for orders placed or performed in visit on 05/23/17      BASIC METABOLIC PANEL      Result  Value Ref Range    SODIUM 135 133 - 143 mmol/L    POTASSIUM 3.3 (L) 3.5 - 5.1 mmol/L    CHLORIDE 103 98 - 107 mmol/L    CO2,TOTAL 28 21 - 31 mmol/L    ANION GAP 4 (L) 5 - 18                    GLUCOSE 84 70 - 105 mg/dL    CALCIUM 9.5 8.6 - 10.3 mg/dL    BUN 16 7 - 25 mg/dL    CREATININE 0.76 0.70 - 1.30 mg/dL    BUN/CREAT RATIO           21                    GFR if African American >60 >60 ml/min/1.73m2    GFR if not African American >60 >60 ml/min/1.73m2       ASSESSMENT AND PLAN:  Eloisa was seen today for lab.    Diagnoses and all orders for this visit:    Hypertension  -     BASIC METABOLIC PANEL; Future  -     BASIC METABOLIC PANEL    Chronic left SI joint pain    Asymptomatic menopausal state   -     XR DXA BONE DENSITY 2 SITES AXIAL; Future    ANXIETY DISORDER    Hypokalemia  -     potassium chloride (KLOR-CON 10; K-TAB) 10 mEq Controlled-Release tablet; Take 2 tablets by mouth once daily with a meal. For Potassium Replacement -- Please notify patient of new prescription --    lab results and schedule of future lab studies reviewed with patient, reviewed diet, exercise and weight control, cardiovascular risk and specific lipid/LDL goals reviewed    -- Expected clinical course discussed   -- Medications and their side effects discussed    The 10-year ASCVD risk score (Willa ALESSIO Jr, et al., 2013) is: 5.6%    Values used to calculate the score:      Age: 61 years      Sex: Female      Is Non- : No      Diabetic: No      Tobacco smoker: No      Systolic Blood Pressure: 134 mmHg      Is BP treated: Yes      HDL  Cholesterol: 46 mg/dL      Total Cholesterol: 205 mg/dL    Eloisa is also recommended to eat a heart-healthy diet, do regular aerobic exercises, maintain a desirable body weight, and avoid tobacco products. These recommendations are from the American Heart Association (AHA) which stresses the importance of lifestyle changes to lower cardiovascular disease risk.     Return in about 1 year (around 5/23/2018) for - Follow-up blood pressure, annual follow-up.    Patient Instructions     Blood pressure is well controlled.     Check labs today.     POTASSIUM (mmol/L)    Date Value   11/01/2016 3.7      See printed information on sacroiliac joint pain.  -- If the home exercise program does not really help, consider physical therapy referral, x-rays, steroid injection    DEXA Scan ordered.  - they will call with date/time of appointment.      Medication list updated.    Blood pressures look very good.  Continue half tablet of your Prinzide daily.      [place] counselors/therapists.    [facility] - has open access/walk-ins available:  Monday 1 PM to 3:30 PM  Tuesday 8:30 AM until 11:30 AM  Wednesday 12:30 PM until 3:30 PM  Thursday 8:30 AM until 11:30 AM     Telephone  Hrs  Kids Address    [facility]   (Many counselors)  [phone]  M-Th 8-5   F 8-12  Yes  [address] [place]   [email or web address]    [facility]   (Many counselors)  [phone]   Yes  [address] [place]   [email or web address]    [facility]   (Many counselors)  [phone]   Yes  [address]   [email or web address]    [facility]   Sheldon Mendez  [phone]   Yes  [address]  [facility]   [email or web address]    [facility]   Frank Gallegos  [phone]       Garry Anaya  [phone]    [address]    Ana Narvaez  [phone]    [address]    Nicolette Perez  [phone]    [address]    Alicia De Luna  [phone]    [address]  [phone]    [address]     Mina Berg  [phone]    [address]    Jody Fitzgerald  [phone]    [address] [place] [address]   [email or web address]    Riana Russo  [phone]    [address] [place] [address] [address]   ([facility])   [email or web address]              Return in approximately 1 year, or sooner as needed for follow-up with Dr. Fitch.  - Follow-up blood pressure, annual follow-up    Clinic : [phone]  Appointment line: [phone]      Chris Fitch MD

## 2018-01-25 VITALS
TEMPERATURE: 98.9 F | HEART RATE: 56 BPM | BODY MASS INDEX: 27.68 KG/M2 | HEIGHT: 65 IN | WEIGHT: 166.13 LBS | DIASTOLIC BLOOD PRESSURE: 80 MMHG | SYSTOLIC BLOOD PRESSURE: 134 MMHG

## 2018-01-25 VITALS — SYSTOLIC BLOOD PRESSURE: 128 MMHG | TEMPERATURE: 98.1 F | HEART RATE: 60 BPM | DIASTOLIC BLOOD PRESSURE: 78 MMHG

## 2018-01-26 ENCOUNTER — DOCUMENTATION ONLY (OUTPATIENT)
Dept: FAMILY MEDICINE | Facility: OTHER | Age: 63
End: 2018-01-26

## 2018-01-26 PROBLEM — M53.3 CHRONIC LEFT SI JOINT PAIN: Status: ACTIVE | Noted: 2017-05-23

## 2018-01-26 PROBLEM — I10 HYPERTENSION: Status: ACTIVE | Noted: 2018-01-26

## 2018-01-26 PROBLEM — G89.29 CHRONIC LEFT SI JOINT PAIN: Status: ACTIVE | Noted: 2017-05-23

## 2018-01-26 PROBLEM — Z85.41 PERSONAL HISTORY OF MALIGNANT NEOPLASM OF CERVIX UTERI: Status: ACTIVE | Noted: 2018-01-26

## 2018-01-26 PROBLEM — N95.1 MENOPAUSAL STATE: Status: ACTIVE | Noted: 2017-05-23

## 2018-01-26 PROBLEM — F41.9 CHRONIC ANXIETY: Status: ACTIVE | Noted: 2018-01-26

## 2018-01-26 PROBLEM — M54.32 SCIATICA OF LEFT SIDE: Status: ACTIVE | Noted: 2017-05-23

## 2018-01-26 PROBLEM — Z87.11 HISTORY OF PEPTIC ULCER DISEASE: Status: ACTIVE | Noted: 2018-01-26

## 2018-01-26 PROBLEM — T78.40XA ALLERGY: Status: ACTIVE | Noted: 2018-01-26

## 2018-01-26 PROBLEM — M81.0 AGE-RELATED OSTEOPOROSIS WITHOUT CURRENT PATHOLOGICAL FRACTURE: Status: ACTIVE | Noted: 2017-06-20

## 2018-01-26 RX ORDER — LISINOPRIL/HYDROCHLOROTHIAZIDE 10-12.5 MG
0.5 TABLET ORAL DAILY
COMMUNITY
Start: 2017-12-07 | End: 2018-12-17

## 2018-01-26 RX ORDER — BLOOD PRESSURE TEST KIT-MEDIUM
KIT MISCELLANEOUS
COMMUNITY
Start: 2015-07-21 | End: 2024-04-03

## 2018-01-26 RX ORDER — SENNOSIDES 8.6 MG
1 CAPSULE ORAL EVERY 8 HOURS PRN
Status: ON HOLD | COMMUNITY
Start: 2015-12-18 | End: 2022-12-17

## 2018-01-27 ASSESSMENT — ANXIETY QUESTIONNAIRES
GAD7 TOTAL SCORE: 0
GAD7 TOTAL SCORE: 0

## 2018-01-27 ASSESSMENT — PATIENT HEALTH QUESTIONNAIRE - PHQ9
SUM OF ALL RESPONSES TO PHQ QUESTIONS 1-9: 0
SUM OF ALL RESPONSES TO PHQ QUESTIONS 1-9: 0

## 2018-02-09 VITALS
SYSTOLIC BLOOD PRESSURE: 131 MMHG | HEIGHT: 66 IN | BODY MASS INDEX: 26.9 KG/M2 | HEART RATE: 60 BPM | DIASTOLIC BLOOD PRESSURE: 72 MMHG | WEIGHT: 167.38 LBS

## 2018-02-11 ASSESSMENT — ANXIETY QUESTIONNAIRES: GAD7 TOTAL SCORE: 0

## 2018-02-11 ASSESSMENT — PATIENT HEALTH QUESTIONNAIRE - PHQ9: SUM OF ALL RESPONSES TO PHQ QUESTIONS 1-9: 0

## 2018-02-12 NOTE — TELEPHONE ENCOUNTER
Patient Information     Patient Name MRN Sex Eloisa Cruz [id number] Female 1955      Telephone Encounter by Nieves Barreto RN at 2017 10:29 AM     Author:  Nieves Barreto RN Service:  (none) Author Type:  NURS- Registered Nurse     Filed:  2017 10:30 AM Encounter Date:  2017 Status:  Signed     :  Nieves Barreto RN (NURS- Registered Nurse)            Prescription refused.  This is a duplicate request.  Nieves Barreto RN.......2017 10:30 AM

## 2018-02-12 NOTE — NURSING NOTE
Patient Information     Patient Name MRN Sex Eloisa Cruz [id number] Female 1955      Nursing Note by Sangeetha Schaeffer at 2017  4:00 PM     Author:  Sangeetha Schaeffer Service:  (none) Author Type:  (none)     Filed:  2017  4:07 PM Encounter Date:  2017 Status:  Signed     :  Sangeetha Schaeffer            Patient presents to the clinic for follow up with hypertension.      Sangeetha Schaeffer LPN        2017 3:47 PM

## 2018-02-12 NOTE — TELEPHONE ENCOUNTER
Patient Information     Patient Name MRN Elosia Martell [id number] Female 1955      Telephone Encounter by Chris Fitch MD at 2017 12:14 PM     Author:  Chris Fitch MD Service:  (none) Author Type:  Physician     Filed:  2017 12:15 PM Encounter Date:  2017 Status:  Signed     :  Chris Fitch MD (Physician)            See if she can come to clinic today - at 3:45/4 PM  hCris Fitch MD

## 2018-02-12 NOTE — TELEPHONE ENCOUNTER
Patient Information     Patient Name MRN Eloisa Martell [id number] Female 1955      Telephone Encounter by Alexia Chapman at 2017 11:33 AM     Author:  Alexia Chapman Service:  (none) Author Type:  (none)     Filed:  2017 11:36 AM Encounter Date:  2017 Status:  Signed     :  Alexia Chapman            DJS- Patient states that he BP medication will run out in 1 week and she was hoping to get worked in.  Thank you.

## 2018-02-12 NOTE — TELEPHONE ENCOUNTER
Patient Information     Patient Name MRN Sex Eloisa Cruz [id number] Female 1955      Telephone Encounter by Celina Canada at 2017 12:21 PM     Author:  Celina Canada Service:  (none) Author Type:  (none)     Filed:  2017 12:21 PM Encounter Date:  2017 Status:  Signed     :  Celina Canada            Spoke with patient.  She is able to come in this afternoon and has been added to the scheduled.  Celina Canada ....................  2017   12:21 PM

## 2018-02-12 NOTE — PROGRESS NOTES
Patient Information     Patient Name MRN Eloisa Martell [id number] Female 1955      Progress Notes by Chris Fitch MD at 2017  4:00 PM     Author:  Chris Fitch MD Service:  (none) Author Type:  Physician     Filed:  2017  6:11 PM Encounter Date:  2017 Status:  Signed     :  Chris Fitch MD (Physician)            Nursing Notes:   Sangeetha Schaeffer  2017  4:07 PM  Signed  Patient presents to the clinic for follow up with hypertension.      Sangeetha Schaeffer LPN        2017 3:47 PM    Eloisa Del Rosario presents to clinic today for:   Chief Complaint    Patient presents with      Follow Up     HPI: Ms. Del Rosario is a 62 y.o. female who presents today for evaluation of above.     (I10) Hypertension  (primary encounter diagnosis)  (F41.9) Chronic anxiety  (E78.2) Mixed hyperlipidemia  (M81.0) Age-related osteoporosis without current pathological fracture     Hypertension, currently well controlled.  Tolerating medication.  Due for labs.  Needs refills.    Chronic anxiety, states she is managing this on her own now.  No longer taking medication.    Hyperlipidemia, currently managing with diet.  Check labs.    Osteoporosis --   + never started Prolia injection. -- following with Endocrinology in [place] - doing calcium / Vit D at this time.   -- check labs.     Ms. Del Rosario's Body mass index is 27.43 kg/(m^2). This is out of the normal range for a 62 y.o. Normal range for ages 18+ is between 18.5 and 24.9. To lose weight we reviewed risks and benefits of appropriate options such as diet, exercise, and medications. Patient's strategy will be  self-directed nutrition plan and self-directed exercise program   BP Readings from Last 1 Encounters:17 : 131/72  Ms. Araya blood pressure is out of the normal range for adults. Per JNC-8 guidelines normal adult blood pressure is < 120/80, pre-hypertensive is between 120/80 and 139/89, and hypertension is 140/90 or greater.  Risks of hypertension were discussed. Patient's strategy will be weight loss, increased activity and reduced salt intake    Functional Capacity: > 4 METS.   Reports that she can climb a flight of stairs without any chest pain/heaviness or shortness of breath.   Patient reports no current symptoms of fevers, chills, nausea/vomiting.   No cough. No shortness of breath.   No change in bowel/bladder habits. No melena, hematochezia. No Hematuria.   No rashes. No palpitations.  No orthopnea/paroxysmal nocturnal dyspnea   No vision or hearing issues.   No significant mood issues   No bruising.     LAKIA:  LAKIA-7 ANXIETY SCREENING 6/20/2017 12/7/2017   LAKIA date (doc flow) 6/20/2017 12/7/2017   Nervous, anxious 0 0   Cannot stop worrying 0 0   Worry about different things 0 0   Cannot relax 0 0   Feeling restless 0 0   Easily annoyed/irritated 0 0   Afraid of awful event 0 0   Score 0 0   Severity none none   Some recent data might be hidden         PHQ9:  PHQ Depression Screening 6/20/2017 12/7/2017   Date of PHQ exam (doc flow) 6/20/2017 12/7/2017   1. Lack of interest/pleasure 0 - Not at all 0 - Not at all   2. Feeling down/depressed 0 - Not at all 0 - Not at all   PHQ-2 TOTAL SCORE 0 0   3. Trouble sleeping 0 - Not at all 0 - Not at all   4. Decreased energy 0 - Not at all 0 - Not at all   5. Appetite change 0 - Not at all 0 - Not at all   6. Feelings of failure 0 - Not at all 0 - Not at all   7. Trouble concentrating 0 - Not at all 0 - Not at all   8. Activity level 0 - Not at all 0 - Not at all   9. Hurting yourself 0 - Not at all 0 - Not at all   PHQ-9 TOTAL SCORE 0 0   PHQ-9 Severity Level none none   Functional Impairment not difficult at all not difficult at all   Some recent data might be hidden          I have personally reviewed the past medical history, past surgical history, medications, allergies, family and social history as listed below, on 12/7/2017.    Patient Active Problem List       Diagnosis  Date Noted      Age-related osteoporosis without current pathological fracture  06/20/2017     Sciatica of left side  05/23/2017     Chronic left SI joint pain  05/23/2017     Menopausal state  05/23/2017     Neuralgia, post-herpetic  12/11/2015     Hyperlipidemia  01/13/2015     S/P radiation therapy for Cervical Cancer - Treated at [facility] - about 1992 06/04/2014     Lymphedema - Bilateral LE - due to lymph node removal - [facility] - about 1992 06/04/2014     FHx: diabetes mellitus  06/04/2014     Colonoscopy refused  06/04/2014     Mammogram declined  06/04/2014     CERVICAL CANCER, HX OF       History of cervical cancer with radiotherapy and cesium implants          Hypertension                 HELICOBACTER PYLORI INFECTION, HX OF       History of positive H. 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 retroperitoneal lymph node dissection      Past Surgical History:      Procedure  Laterality Date     COLONOSCOPY SCREENING  1991     LAPAROTOMY      Exploratory lap.retroperitoneal lymph node dissection       SALPINGO-OOPHORECTOMY      Removal of left tube and ovary       Current Outpatient Prescriptions       Medication  Sig Dispense Refill     acetaminophen SR (TYLENOL ARTHRITIS) 650 mg Extended-Release tablet Take 1 tablet by mouth every 8 hours if needed for Pain. Max acetaminophen dose: 4000mg in 24 hrs. 500 tablet 0     Blood Pressure Test Kit-Medium kit As directed. Dispense what insurance will cover 1 Each 0     cholecalciferol (VITAMIN D3) 5,000 unit capsule Take 1 capsule by mouth once daily. For Vitamin D Deficiency 100 capsule 3     Comp.Stocking,Thigh,Long,Med. misc As directed. 99 months 2 Package 0      lisinopril-hydrochlorothiazide (10-12.5 mg) tablet (PRINZIDE; ZESTORETIC) Take 0.5 tablets by mouth once daily. -- For Blood Pressure - goal /80 90 tablet 3     Allergies     Allergen  Reactions     Erythromycin Shortness Of Breath, Rash and Itching     Family History       Problem   Relation Age of Onset     Heart Disease  Father       at 57       Diabetes  Mother      type II DM       Other  Mother       at 89~hip fracture       Heart Disease  Brother       at 56       Diabetes  Sister      Type II DM -  at about 73       Thyroid Disease  Sister      Family Status     Relation  Status     Son Alive     Daughter Alive     Daughter Alive     Father  at age 57    CAD -  of MI      Mother  at age 89      Brother  at age 56    CAD -  of MI      Sister      Sister Alive     Brother Alive     Brother Alive     Brother Alive     Brother Alive     Brother      Sister      Sister      Social History     Social History        Marital status:       Spouse name: N/A     Number of children:  3     Years of education:  N/A     Social History Main Topics        Smoking status:  Former Smoker     Packs/day: 0.25     Years: 15.00     Types: Cigarettes     Quit date: 1992     Smokeless tobacco:  Never Used     Alcohol use  No     Drug use:  No     Sexual activity:  Not on file     Other Topics  Concern     Not on file      Social History Narrative     Siblings Six brothers, 2 sisters    Children LauraRubina, BRUNILDA    Worked at [employer] - now has not worked for a while.      Lives by herself - Spouse  MVA age 23.  Has 3 children.    Previously smoked, one pack daily - quit about .    No regular exercise program.     Works in the garden, walks with daughter intermittently.     Previous insurance issues - no med coverage - stopped HTN medications intermittently in the past.          Pertinent ROS was performed and was negative as noted in HPI  "above.     EXAM:   Vitals:     12/07/17 1552   BP: 131/72   Pulse: 60   Weight: 75.9 kg (167 lb 6 oz)   Height: 1.664 m (5' 5.5\")     BP Readings from Last 3 Encounters:    12/07/17 131/72   06/20/17 128/78   05/23/17 134/80     Wt Readings from Last 3 Encounters:    12/07/17 75.9 kg (167 lb 6 oz)   05/23/17 75.4 kg (166 lb 2 oz)   12/08/16 74.4 kg (164 lb)     Estimated body mass index is 27.43 kg/(m^2) as calculated from the following:    Height as of this encounter: 1.664 m (5' 5.5\").    Weight as of this encounter: 75.9 kg (167 lb 6 oz).     EXAM:  Constitutional: well groomed / good hygiene, casual dress  ENT: Normocephalic, Atraumatic, Thyroid without nodules or tenderness   Nose/Mouth: Oral pharynx without erythema or exudates, Nose is patent bilaterally, no rhinorrhea and Dental hygeine adequate   Eyes:  Pupils equal round and reactive to light, Extraocular muscles intact, Sclera non-icteric, Conjunctiva without erythema  Lymphatic Exam: Non-palpable nodes in neck, clavicular regions  Pulmonary: Lungs are clear to auscultation bilaterally, without wheezes or crackles  Cardiovascular Exam: regular rate and rhythm, no pedal edema  Gastrointestinal Exam: Soft, non-tender, non-distended, positive bowel sounds  Integument: No abnormal rashes, sores, or ulcerations noted  Neurologic Exam: CN 3-12 grossly intact   Musculoskeletal Exam: Moves upper and lower extremities symmetrically, No focal weakness  Gait and station appear grossly normal  Psychiatric Exam: Awake and Alert, Affect and mood appropriate  Speech is fluent, Thought process is normal    INVESTIGATIONS:  Results for orders placed or performed in visit on 12/07/17      BASIC METABOLIC PANEL      Result  Value Ref Range    SODIUM 141 133 - 143 mmol/L    POTASSIUM 4.2 3.5 - 5.1 mmol/L    CHLORIDE 104 98 - 107 mmol/L    CO2,TOTAL 27 21 - 31 mmol/L    ANION GAP 10 5 - 18                    GLUCOSE 89 70 - 105 mg/dL    CALCIUM 9.1 8.6 - 10.3 mg/dL    BUN 17 " 7 - 25 mg/dL    CREATININE 0.78 0.70 - 1.30 mg/dL    BUN/CREAT RATIO           22                    GFR if African American >60 >60 ml/min/1.73m2    GFR if not African American >60 >60 ml/min/1.73m2   MAGNESIUM      Result  Value Ref Range    MAGNESIUM 1.7 (L) 1.9 - 2.7 mg/dL   PHOSPHORUS      Result  Value Ref Range    PHOSPHORUS 3.9 2.5 - 5.0 mg/dL   VITAMIN D 25 (DEFICIENCY)      Result  Value Ref Range    VITAMIN D TOTAL AFL 39.6   ng/mL   CBC WITH AUTO DIFFERENTIAL      Result  Value Ref Range    WHITE BLOOD COUNT         6.2 4.5 - 11.0 thou/cu mm    RED BLOOD COUNT           4.31 4.00 - 5.20 mil/cu mm    HEMOGLOBIN                12.5 12.0 - 16.0 g/dL    HEMATOCRIT                37.9 33.0 - 51.0 %    MCV                       88 80 - 100 fL    MCH                       29.0 26.0 - 34.0 pg    MCHC                      33.0 32.0 - 36.0 g/dL    RDW                       13.6 11.5 - 15.5 %    PLATELET COUNT            251 140 - 440 thou/cu mm    MPV                       9.5 6.5 - 11.0 fL    NEUTROPHILS               46.7 42.0 - 72.0 %    LYMPHOCYTES               43.7 20.0 - 44.0 %    MONOCYTES                 7.4 <12.0 %    EOSINOPHILS               1.4 <8.0 %    BASOPHILS                 0.6 <3.0 %    IMMATURE GRANULOCYTES(METAS,MYELOS,PROS) 0.2 %    ABSOLUTE NEUTROPHILS      2.9 1.7 - 7.0 thou/cu mm    ABSOLUTE LYMPHOCYTES      2.7 0.9 - 2.9 thou/cu mm    ABSOLUTE MONOCYTES        0.5 <0.9 thou/cu mm    ABSOLUTE EOSINOPHILS      0.1 <0.5 thou/cu mm    ABSOLUTE BASOPHILS        0.0 <0.3 thou/cu mm    ABSOLUTE IMMATURE GRANULOCYTES(METAS,MYELOS,PROS) 0.0 <=0.3 thou/cu mm       ASSESSMENT AND PLAN:  Eloisa was seen today for follow up.    Diagnoses and all orders for this visit:    Hypertension  -     BASIC METABOLIC PANEL; Future  -     CBC WITH DIFFERENTIAL; Future  -     lisinopril-hydrochlorothiazide (10-12.5 mg) tablet (PRINZIDE; ZESTORETIC); Take 0.5 tablets by mouth once daily. -- For Blood Pressure -  goal /80  -     BASIC METABOLIC PANEL  -     CBC WITH DIFFERENTIAL  -     CBC WITH AUTO DIFFERENTIAL    Chronic anxiety    Mixed hyperlipidemia    Age-related osteoporosis without current pathological fracture  -     MAGNESIUM; Future  -     PHOSPHORUS; Future  -     VITAMIN D 25 (DEFICIENCY); Future  -     MAGNESIUM  -     PHOSPHORUS  -     VITAMIN D 25 (DEFICIENCY)    Lab results and schedule of future lab studies reviewed with patient, reviewed diet, exercise and weight control, recommended sodium restriction    -- Expected clinical course discussed   -- Medications and their side effects discussed    Eloisa is also recommended to eat a heart-healthy diet, do regular aerobic exercises, maintain a desirable body weight, and avoid tobacco products. These recommendations are from the American Heart Association (AHA) which stresses the importance of lifestyle changes to lower cardiovascular disease risk.     Return in about 1 year (around 12/7/2018) for -- annual follow-up.    Patient Instructions   Blood pressure is well controlled.    Medications refilled.    Labs today.    Consider flu shots annually.      -- Try to avoid Carbohydrates as much as possible -- breads, pasta, baked goods, cakes, oatmeal, cold cereal, potatoes.   These are turned to sugar in one metabolic conversion, cause insulin secretion and increased fat deposition / weight gain.      -- Eat more lean meats, proteins, eggs, nuts.       Return in approximately 1 year, or sooner as needed for follow-up with Dr. Fitch.  -- annual follow-up    Clinic : [phone]  Appointment line: [phone]      Chris Fitch MD

## 2018-02-12 NOTE — TELEPHONE ENCOUNTER
Patient Information     Patient Name MRN Sex Eloisa Cruz [id number] Female 1955      Telephone Encounter by Joy Gipson at 2017 11:48 AM     Author:  Joy Gipson Service:  (none) Author Type:  (none)     Filed:  2017 11:53 AM Encounter Date:  2017 Status:  Signed     :  Joy Gipson            Returned patient call, and verified last name and date of birth. Patient is going to run out of her BP medication in the next week.  Can she get a refill, or get worked in? Please advise    Joy Gipson LPN..............2017 11:52 AM

## 2018-02-12 NOTE — PATIENT INSTRUCTIONS
Patient Information     Patient Name MRN Eloisa Martell [id number] Female 1955      Patient Instructions by Chris Fitch MD at 2017  4:00 PM     Author:  Chris Fitch MD  Service:  (none) Author Type:  Physician     Filed:  2017  4:18 PM  Encounter Date:  2017 Status:  Addendum     :  Chris Fitch MD (Physician)        Related Notes: Original Note by Chris Fitch MD (Physician) filed at 2017  4:11 PM            Blood pressure is well controlled.    Medications refilled.    Labs today.    Consider flu shots annually.      -- Try to avoid Carbohydrates as much as possible -- breads, pasta, baked goods, cakes, oatmeal, cold cereal, potatoes.   These are turned to sugar in one metabolic conversion, cause insulin secretion and increased fat deposition / weight gain.      -- Eat more lean meats, proteins, eggs, nuts.       Return in approximately 1 year, or sooner as needed for follow-up with Dr. Fitch.  -- annual follow-up    Clinic : [phone]  Appointment line: [phone]

## 2018-03-24 ENCOUNTER — HEALTH MAINTENANCE LETTER (OUTPATIENT)
Age: 63
End: 2018-03-24

## 2018-07-19 ENCOUNTER — HOSPITAL ENCOUNTER (OUTPATIENT)
Dept: MAMMOGRAPHY | Facility: OTHER | Age: 63
Discharge: HOME | End: 2018-07-19
Attending: INTERNAL MEDICINE | Admitting: INTERNAL MEDICINE
Payer: MEDICARE

## 2018-07-19 DIAGNOSIS — Z12.39 SCREENING BREAST EXAMINATION: ICD-10-CM

## 2018-07-19 PROCEDURE — 77067 SCR MAMMO BI INCL CAD: CPT

## 2018-07-23 NOTE — PROGRESS NOTES
Patient Information     Patient Name  Eloisa Del Rosario MRN  [id number] Sex  Female   1955      Letter by Chris Fitch MD at      Author:  Chris Fitch MD Service:  (none) Author Type:  (none)    Filed:   Date of Service:   Status:  (Other)           Eloisa Del Rosario  [address]  [address] [facility] [address]          May 30, 2017    Dear Ms. Del Rosario:    Following are the tests completed during your last clinic visit.  The results of these tests are included below.      2017 - Procedure:  XR DXA BONE DENSITY 2 SITES AXIAL    Clinical History:  Asymptomatic menopausal state.    Comparison:  None.    Interpretation:  Osteoporosis    Lowest Category Site:  Lumbar spine    Lowest Category T-Score:  -3.9    Lowest Category % Change from most recent:  No prior   %    FRAX Score at hip is:  2.5   %    FRAX Score for major osteoporotic fracture is:  22.1   %            Only the lowest category is reported for each patient per guidelines of the International Society for Clinical Densitometry.    See attached DXA images and FRAX report for further details.    WHO DIAGNOSTIC GUIDELINES FOR BONE MASS MEASUREMENT:    Normal                        T-Score at or above -1.0    Osteopenia                T-Score between -1.0 and -2.5    Osteoporosis            T-Score at or below -2.5    Providers should consider medical therapies when 10 year probability of hip fracture is greater than or equal to 3%, or 10 year probability of major osteoporosis related fracture is greater than or equal to 20%.    ** Exam performed on GE Lunar Prodigy Advance      ** Exam performed on GE Lunar Prodigy Advance    Electronically Signed By: Duane De Luna M.D. on 2017 2:57 PM    If you have any further questions or problems contact my office at  [phone]   --- or send [employer] message --- otherwise schedule an appointment.    Clinic : [phone]  Appointment line: [phone]     Thank you,    Chris HUBER  MD [place]    Internal Medicine  [facility] and [facility]     Reviewed and electronically signed by provider.

## 2018-07-23 NOTE — PROGRESS NOTES
Patient Information     Patient Name  Eloisa Del Rosario MRN  [id number] Sex  Female   1955      Letter by Chris Fitch MD at      Author:  Chris Fitch MD Service:  (none) Author Type:  (none)    Filed:   Encounter Date:  2017 Status:  (Other)           Eloisa Del Rosario  [address]  [address] [facility] [address]          2017    Dear Ms. Del Rosario:    Following are the tests completed during your last clinic visit.  The results of these tests are included below.      Magnesium level is a little low, otherwise labs look good.    Results for orders placed or performed in visit on 17      BASIC METABOLIC PANEL      Result  Value Ref Range    SODIUM 141 133 - 143 mmol/L    POTASSIUM 4.2 3.5 - 5.1 mmol/L    CHLORIDE 104 98 - 107 mmol/L    CO2,TOTAL 27 21 - 31 mmol/L    ANION GAP 10 5 - 18                    GLUCOSE 89 70 - 105 mg/dL    CALCIUM 9.1 8.6 - 10.3 mg/dL    BUN 17 7 - 25 mg/dL    CREATININE 0.78 0.70 - 1.30 mg/dL    BUN/CREAT RATIO           22                    GFR if African American >60 >60 ml/min/1.73m2    GFR if not African American >60 >60 ml/min/1.73m2   MAGNESIUM      Result  Value Ref Range    MAGNESIUM 1.7 (L) 1.9 - 2.7 mg/dL   PHOSPHORUS      Result  Value Ref Range    PHOSPHORUS 3.9 2.5 - 5.0 mg/dL   VITAMIN D 25 (DEFICIENCY)      Result  Value Ref Range    VITAMIN D TOTAL AFL 39.6   ng/mL   CBC WITH AUTO DIFFERENTIAL      Result  Value Ref Range    WHITE BLOOD COUNT         6.2 4.5 - 11.0 thou/cu mm    RED BLOOD COUNT           4.31 4.00 - 5.20 mil/cu mm    HEMOGLOBIN                12.5 12.0 - 16.0 g/dL    HEMATOCRIT                37.9 33.0 - 51.0 %    MCV                       88 80 - 100 fL    MCH                       29.0 26.0 - 34.0 pg    MCHC                      33.0 32.0 - 36.0 g/dL    RDW                       13.6 11.5 - 15.5 %    PLATELET COUNT            251 140 - 440 thou/cu mm    MPV                       9.5 6.5 - 11.0 fL     NEUTROPHILS               46.7 42.0 - 72.0 %    LYMPHOCYTES               43.7 20.0 - 44.0 %    MONOCYTES                 7.4 <12.0 %    EOSINOPHILS               1.4 <8.0 %    BASOPHILS                 0.6 <3.0 %    IMMATURE GRANULOCYTES(METAS,MYELOS,PROS) 0.2 %    ABSOLUTE NEUTROPHILS      2.9 1.7 - 7.0 thou/cu mm    ABSOLUTE LYMPHOCYTES      2.7 0.9 - 2.9 thou/cu mm    ABSOLUTE MONOCYTES        0.5 <0.9 thou/cu mm    ABSOLUTE EOSINOPHILS      0.1 <0.5 thou/cu mm    ABSOLUTE BASOPHILS        0.0 <0.3 thou/cu mm    ABSOLUTE IMMATURE GRANULOCYTES(METAS,MYELOS,PROS) 0.0 <=0.3 thou/cu mm         If you have any further questions or problems contact my office at  [phone]   --- or send [employer] message --- otherwise schedule an appointment.    Clinic : [phone]  Appointment line: [phone]     Thank you,    Chris Fitch MD    Internal Medicine  [facility] and [facility]     Reviewed and electronically signed by provider.

## 2018-07-24 NOTE — PROGRESS NOTES
Patient Information     Patient Name  Eloisa Del Rosario MRN  [id number] Sex  Female   1955      Letter by Chris Fitch MD at      Author:  Chris Fitch MD Service:  (none) Author Type:  (none)    Filed:   Encounter Date:  2017 Status:  (Other)           Eloisa Del Rosario  [address]  [address] [facility] [address]          May 23, 2017    Dear Ms. Del Rosario:    Following are the tests completed during your last clinic visit.  The results of these tests are included below.      - Potassium is low, start oral potassium replacement.  Prescription sent to pharmacy.    - potassium chloride (KLOR-CON 10; K-TAB) 10 mEq Controlled-Release tablet; Take 2 tablets by mouth once daily with a meal. For Potassium Replacement       Results for orders placed or performed in visit on 17      BASIC METABOLIC PANEL      Result  Value Ref Range    SODIUM 135 133 - 143 mmol/L    POTASSIUM 3.3 (L) 3.5 - 5.1 mmol/L    CHLORIDE 103 98 - 107 mmol/L    CO2,TOTAL 28 21 - 31 mmol/L    ANION GAP 4 (L) 5 - 18                    GLUCOSE 84 70 - 105 mg/dL    CALCIUM 9.5 8.6 - 10.3 mg/dL    BUN 16 7 - 25 mg/dL    CREATININE 0.76 0.70 - 1.30 mg/dL    BUN/CREAT RATIO           21                    GFR if African American >60 >60 ml/min/1.73m2    GFR if not African American >60 >60 ml/min/1.73m2         If you have any further questions or problems contact my office at  [phone]   --- or send [employer] message --- otherwise schedule an appointment.    Clinic : [phone]  Appointment line: [phone]     Thank you,    Chris Fitch MD    Internal Medicine  [facility] and [facility]     Reviewed and electronically signed by provider.

## 2018-07-30 ENCOUNTER — TRANSFERRED RECORDS (OUTPATIENT)
Dept: HEALTH INFORMATION MANAGEMENT | Facility: OTHER | Age: 63
End: 2018-07-30

## 2018-08-09 PROBLEM — M81.0 OSTEOPOROSIS: Status: ACTIVE | Noted: 2017-07-28

## 2018-08-10 ENCOUNTER — OFFICE VISIT (OUTPATIENT)
Dept: INTERNAL MEDICINE | Facility: OTHER | Age: 63
End: 2018-08-10
Attending: INTERNAL MEDICINE
Payer: MEDICARE

## 2018-08-10 VITALS
WEIGHT: 168.5 LBS | BODY MASS INDEX: 27.61 KG/M2 | DIASTOLIC BLOOD PRESSURE: 88 MMHG | SYSTOLIC BLOOD PRESSURE: 136 MMHG | HEART RATE: 64 BPM

## 2018-08-10 DIAGNOSIS — M81.0 OSTEOPOROSIS WITHOUT CURRENT PATHOLOGICAL FRACTURE, UNSPECIFIED OSTEOPOROSIS TYPE: ICD-10-CM

## 2018-08-10 DIAGNOSIS — M77.12 LATERAL EPICONDYLITIS OF LEFT ELBOW: Primary | ICD-10-CM

## 2018-08-10 DIAGNOSIS — I10 BENIGN ESSENTIAL HYPERTENSION: ICD-10-CM

## 2018-08-10 DIAGNOSIS — M25.522 LEFT ELBOW PAIN: ICD-10-CM

## 2018-08-10 PROCEDURE — 99214 OFFICE O/P EST MOD 30 MIN: CPT | Performed by: INTERNAL MEDICINE

## 2018-08-10 PROCEDURE — G0463 HOSPITAL OUTPT CLINIC VISIT: HCPCS

## 2018-08-10 ASSESSMENT — ANXIETY QUESTIONNAIRES
IF YOU CHECKED OFF ANY PROBLEMS ON THIS QUESTIONNAIRE, HOW DIFFICULT HAVE THESE PROBLEMS MADE IT FOR YOU TO DO YOUR WORK, TAKE CARE OF THINGS AT HOME, OR GET ALONG WITH OTHER PEOPLE: NOT DIFFICULT AT ALL
GAD7 TOTAL SCORE: 0
2. NOT BEING ABLE TO STOP OR CONTROL WORRYING: NOT AT ALL
7. FEELING AFRAID AS IF SOMETHING AWFUL MIGHT HAPPEN: NOT AT ALL
3. WORRYING TOO MUCH ABOUT DIFFERENT THINGS: NOT AT ALL
5. BEING SO RESTLESS THAT IT IS HARD TO SIT STILL: NOT AT ALL
6. BECOMING EASILY ANNOYED OR IRRITABLE: NOT AT ALL
1. FEELING NERVOUS, ANXIOUS, OR ON EDGE: NOT AT ALL

## 2018-08-10 ASSESSMENT — ENCOUNTER SYMPTOMS
HEMATURIA: 0
CHILLS: 0
NAUSEA: 0
ARTHRALGIAS: 1
LIGHT-HEADEDNESS: 0
BRUISES/BLEEDS EASILY: 0
DIARRHEA: 0
DIZZINESS: 0
CONFUSION: 0
FEVER: 0
AGITATION: 0
COUGH: 0
PALPITATIONS: 0
EYE PAIN: 0
MYALGIAS: 0
FATIGUE: 0
VOMITING: 0
DYSURIA: 0
SHORTNESS OF BREATH: 0
ABDOMINAL PAIN: 0
WHEEZING: 0

## 2018-08-10 ASSESSMENT — PATIENT HEALTH QUESTIONNAIRE - PHQ9: 5. POOR APPETITE OR OVEREATING: NOT AT ALL

## 2018-08-10 ASSESSMENT — PAIN SCALES - GENERAL: PAINLEVEL: MODERATE PAIN (4)

## 2018-08-10 NOTE — MR AVS SNAPSHOT
After Visit Summary   8/10/2018    Eloisa Del Rosario    MRN: [id number]           Patient Information     Date Of Birth          1955        Visit Information        Provider Department      8/10/2018 8:40 AM Chris Fitch MD [facility]        Today's Diagnoses     Lateral epicondylitis of left elbow    -  1    Left elbow pain        Osteoporosis without current pathological fracture, unspecified osteoporosis type        Benign essential hypertension          Care Instructions    Start calcium supplement daily or every other day.     Okay to restart Vitamin D supplement 5,000 international unit  -- just take every other day.     [facility] labs 7/30/2018:  Vitamin D Total   Sodium   Potassium   Chloride   Carbon Dioxide   Anion Gap   Glucose   Creatinine   Glomerular Filtration Rate   Blood Urea Nitrogen   Albumin   Phosphorus   Calcium       83 (H) 33   139 139   4.0 4.4   103 104   29 28   7.0 7.0   96 88   0.89 0.80   >60 >60   13 15   4.1 3.6   3.9 3.5   10.2 9.9     Immunization History   Administered Date(s) Administered     TDAP Vaccine (Boostrix) 02/19/2009        Pneumococcal Pneumonia vaccines (PCV 13 and PCV 23)     Pneumococcal Conjugate 13 - Valent Vaccine (One time only).     Pneumococcal 23 - Valent Vaccine -- Two doses (One before age 65 and One After)    -- repeat every 5 years in certain patient populations.     PCV 13should be given prior to PCV 23 -- THEN -- In eight weeks or more, PCV 23 can be given.   If the patient has already received PCV 23, they should not receive PCV 13 for one year.     Do the 3 arm exercises regularly..... About 10 of each activity, then switch. (30 total) -- few times daily.... Increase as tolerated.     Return as needed for follow-up with Dr. Fitch.    - Schedule Welcome to Medicare Wellness Visit with Janice Benites NP.     Clinic : [phone]  Appointment line: [phone]    Treating Tennis Elbow    Your  treatment will depend on how inflamed your tendon is. The goal is to relieve your symptoms and help you regain full use of your elbow.  Rest and medicine  Wearing a tennis elbow splint allows the inflamed tendon to rest. It must be worn properly. It should be placed down the arm past the painful area of the elbow. If it is directly over the inflamed tendon, it can worsen the symptoms. This brace can help the tendon heal. Using your other hand or changing your  also takes stress off the tendon. Oral nonsteroidal anti-inflammatory medicines (NSAIDs) and ice can relieve pain and reduce swelling.  Exercises and therapy  Your healthcare provider may give you an exercise program. He or she may refer you to a physical therapist. The physical therapist will teach you how to gently stretch and strengthen the muscles around your elbow.  Anti-inflammatory injections  Your healthcare provider may give you injections of an anti-inflammatory medicine, such as cortisone. This helps reduce swelling. You may have more pain at first. But in a few days, your elbow should feel better.  If surgery is needed  If your symptoms persist for a long time, or other treatments don t work, your healthcare provider may recommend surgery. Surgery repairs the inflamed tendon.   Date Last Reviewed: 1/1/2018 2000-2017 [employer]. [address] [place] [address], [place] [address]. All rights reserved. This information is not intended as a substitute for professional medical care. Always follow your healthcare professional's instructions.        Understanding Lateral Epicondylitis    Tendons are strong bands of tissue that connect muscles to bones. Lateral epicondylitis affects the tendons that connect muscles in the forearm to the lateral epicondyle. This is the bony knob on the outer side of the elbow. The condition occurs if the extensor tendons of the wrist become red and swollen (irritated). This can cause pain in the elbow, forearm,  and wrist. Because the condition is sometimes caused by playing tennis, it is also known as  tennis elbow.   How to say it  LA-tuhr-eliecer [id number]-TIC-duh-LY-tis   What causes lateral epicondylitis?  The condition most often occurs because of overuse. This can be from any activity that repeatedly puts stress on the forearm extensor muscles or tendons and wrist. For instance, playing tennis, lifting weights, cutting meat, painting, and typing can all cause the condition. Wear and tear of the tendons from aging or an injury to the tendons can also cause the condition.  Symptoms of lateral epicondylitis  The most common symptom is pain. You may feel it on the outer side of the elbow and down the back of the forearm. It may be worse when moving or using the elbow, forearm, or wrist. You may also feel pain when gripping or lifting things.  Treatment for lateral epicondylitis  Treatments may include:    Resting the elbow, forearm, and wrist. You ll need to avoid movements that can make your symptoms worse. You also may need to avoid certain sports and types of work for a time. This helps relieve symptoms and prevent further damage to the tendons.    Changing the action that caused the problem. For instance, if the tendons were damaged from playing tennis, it may help to change your playing technique or use different equipment. This helps prevent further damage to the tendons.    Using cold packs. Putting an ice pack on the injured area can help reduce pain and swelling.    Taking pain medicines. Taking prescription or over-the-counter pain medicines may help reduce pain and swelling.      Wearing a brace. This helps reduce strain on the muscles and tendons in the forearm, which may relieve symptoms. It is very important to wear the brace properly.    Doing exercises and physical therapy. These help improve strength and range of motion in the elbow, forearm, and wrist.    Getting shots of medicine into the injured area. These  may help relieve symptoms for a time.    Having surgery. This may be an option if other treatments fail to relieve symptoms. In many cases, the surgeon removes the damaged tissue.  Possible complications of lateral epicondylitis  If the tendons involved don t heal properly, symptoms may return or get worse. To help prevent this, follow your treatment plan as directed.  When to call your healthcare provider  Call your healthcare provider right away if you have any of these:    Fever of 100.4 F (38 C) or higher, or as directed    Redness, swelling, or warmth in the elbow or forearm that gets worse    Symptoms that don t get better with treatment, or get worse    New symptoms   Date Last Reviewed: 3/10/2016    9435-0976 [employer]. [address] [place] [address], [place] [address]. All rights reserved. This information is not intended as a substitute for professional medical care. Always follow your healthcare professional's instructions.                Follow-ups after your visit        Follow-up notes from your care team     Return if symptoms worsen or fail to improve.      Who to contact     If you have questions or need follow up information about today's clinic visit or your schedule please contact [facility] directly at [phone].  Normal or non-critical lab and imaging results will be communicated to you by [email or web address], letter or phone within 4 business days after the clinic has received the results. If you do not hear from us within 7 days, please contact the clinic through [email or web address] or phone. If you have a critical or abnormal lab result, we will notify you by phone as soon as possible.  Submit refill requests through [employer] or call your pharmacy and they will forward the refill request to us. Please allow 3 business days for your refill to be completed.          Additional Information About Your Visit        [email or web address] [facility] Information     [employer] gives you secure access to your electronic  health record. If you see a primary care provider, you can also send messages to your care team and make appointments. If you have questions, please call your primary care clinic.  If you do not have a primary care provider, please call [phone] and they will assist you.        Care EveryWhere ID     This is your Care EveryWhere ID. This could be used by other organizations to access your [place] medical records  [id number]        Your Vitals Were     Pulse BMI (Body Mass Index)                64 27.61 kg/m2           Blood Pressure from Last 3 Encounters:   08/10/18 136/88   12/07/17 131/72   06/20/17 128/78    Weight from Last 3 Encounters:   08/10/18 168 lb 8 oz (76.4 kg)   12/07/17 167 lb 6 oz (75.9 kg)   05/23/17 166 lb 2 oz (75.4 kg)              We Performed the Following     ARM BAND UNIVERSAL        Primary Care Provider Office Phone # Fax #    Chris Fitch [phone]       [address] [facility] [address]        Equal Access to Services     [facility]: Hadii aad ku hadasho Sotrudy, waaxda luqadaha, qaybta kaalmada xiomy, dania oakley . So [facility] [phone].    ATENCIÓN: Si habla español, tiene a patterson disposición servicios gratuitos de asistencia lingüística. [facility] [phone].    We comply with applicable federal civil rights laws and [place] laws. We do not discriminate on the basis of race, color, national origin, age, disability, sex, sexual orientation, or gender identity.            Thank you!     Thank you for choosing [facility]  for your care. Our goal is always to provide you with excellent care. Hearing back from our patients is one way we can continue to improve our services. Please take a few minutes to complete the written survey that you may receive in the mail after your visit with us. Thank you!             Your Updated Medication List - Protect others around you: Learn how to safely use,  store and throw away your medicines at www.disposemymeds.org.          This list is accurate as of 8/10/18  9:04 AM.  Always use your most recent med list.                   Brand Name Dispense Instructions for use Diagnosis    acetaminophen 650 MG CR tablet    TYLENOL     Take 1 tablet by mouth every 8 hours as needed for pain Max acetaminophen dose: 4000 mg in 24 hrs        lisinopril-hydrochlorothiazide 10-12.5 MG per tablet    PRINZIDE/ZESTORETIC     Take 0.5 tablets by mouth daily Goal /80        Medical Compression Stockings Misc      As directed. 99 months        Self-Taking Blood Pressure Kit      As directed. Dispense what insurance will cover

## 2018-08-10 NOTE — NURSING NOTE
"Patient presents to the clinic for medication management.    Chief Complaint   Patient presents with     Recheck Medication       Initial There were no vitals taken for this visit. Estimated body mass index is 27.43 kg/(m^2) as calculated from the following:    Height as of 12/7/17: 5' 5.5\" (1.664 m).    Weight as of 12/7/17: 167 lb 6 oz (75.9 kg).  Medication Reconciliation: complete    Sangeetha Schaeffer LPN    "

## 2018-08-10 NOTE — PATIENT INSTRUCTIONS
Start calcium supplement daily or every other day.     Okay to restart Vitamin D supplement 5,000 international unit  -- just take every other day.     [facility] 7/30/2018:  Vitamin D Total   Sodium   Potassium   Chloride   Carbon Dioxide   Anion Gap   Glucose   Creatinine   Glomerular Filtration Rate   Blood Urea Nitrogen   Albumin   Phosphorus   Calcium       83 (H) 33   139 139   4.0 4.4   103 104   29 28   7.0 7.0   96 88   0.89 0.80   >60 >60   13 15   4.1 3.6   3.9 3.5   10.2 9.9     Immunization History   Administered Date(s) Administered     TDAP Vaccine (Boostrix) 02/19/2009        Pneumococcal Pneumonia vaccines (PCV 13 and PCV 23)     Pneumococcal Conjugate 13 - Valent Vaccine (One time only).     Pneumococcal 23 - Valent Vaccine -- Two doses (One before age 65 and One After)    -- repeat every 5 years in certain patient populations.     PCV 13should be given prior to PCV 23 -- THEN -- In eight weeks or more, PCV 23 can be given.   If the patient has already received PCV 23, they should not receive PCV 13 for one year.     Do the 3 arm exercises regularly..... About 10 of each activity, then switch. (30 total) -- few times daily.... Increase as tolerated.     Return as needed for follow-up with Dr. Fitch.    - Schedule Welcome to Medicare Wellness Visit with Janice Benites NP.     Clinic : [phone]  Appointment line: [phone]    Treating Tennis Elbow    Your treatment will depend on how inflamed your tendon is. The goal is to relieve your symptoms and help you regain full use of your elbow.  Rest and medicine  Wearing a tennis elbow splint allows the inflamed tendon to rest. It must be worn properly. It should be placed down the arm past the painful area of the elbow. If it is directly over the inflamed tendon, it can worsen the symptoms. This brace can help the tendon heal. Using your other hand or changing your  also takes stress off the tendon. Oral  nonsteroidal anti-inflammatory medicines (NSAIDs) and ice can relieve pain and reduce swelling.  Exercises and therapy  Your healthcare provider may give you an exercise program. He or she may refer you to a physical therapist. The physical therapist will teach you how to gently stretch and strengthen the muscles around your elbow.  Anti-inflammatory injections  Your healthcare provider may give you injections of an anti-inflammatory medicine, such as cortisone. This helps reduce swelling. You may have more pain at first. But in a few days, your elbow should feel better.  If surgery is needed  If your symptoms persist for a long time, or other treatments don t work, your healthcare provider may recommend surgery. Surgery repairs the inflamed tendon.   Date Last Reviewed: 1/1/2018 2000-2017 [employer]. [address] [place] [address], [place] [address]. All rights reserved. This information is not intended as a substitute for professional medical care. Always follow your healthcare professional's instructions.        Understanding Lateral Epicondylitis    Tendons are strong bands of tissue that connect muscles to bones. Lateral epicondylitis affects the tendons that connect muscles in the forearm to the lateral epicondyle. This is the bony knob on the outer side of the elbow. The condition occurs if the extensor tendons of the wrist become red and swollen (irritated). This can cause pain in the elbow, forearm, and wrist. Because the condition is sometimes caused by playing tennis, it is also known as  tennis elbow.   How to say it  LA-tuhr-eliecer [id number]-[id number]-duh-LY-tis   What causes lateral epicondylitis?  The condition most often occurs because of overuse. This can be from any activity that repeatedly puts stress on the forearm extensor muscles or tendons and wrist. For instance, playing tennis, lifting weights, cutting meat, painting, and typing can all cause the condition. Wear and tear of the tendons from  aging or an injury to the tendons can also cause the condition.  Symptoms of lateral epicondylitis  The most common symptom is pain. You may feel it on the outer side of the elbow and down the back of the forearm. It may be worse when moving or using the elbow, forearm, or wrist. You may also feel pain when gripping or lifting things.  Treatment for lateral epicondylitis  Treatments may include:    Resting the elbow, forearm, and wrist. You ll need to avoid movements that can make your symptoms worse. You also may need to avoid certain sports and types of work for a time. This helps relieve symptoms and prevent further damage to the tendons.    Changing the action that caused the problem. For instance, if the tendons were damaged from playing tennis, it may help to change your playing technique or use different equipment. This helps prevent further damage to the tendons.    Using cold packs. Putting an ice pack on the injured area can help reduce pain and swelling.    Taking pain medicines. Taking prescription or over-the-counter pain medicines may help reduce pain and swelling.      Wearing a brace. This helps reduce strain on the muscles and tendons in the forearm, which may relieve symptoms. It is very important to wear the brace properly.    Doing exercises and physical therapy. These help improve strength and range of motion in the elbow, forearm, and wrist.    Getting shots of medicine into the injured area. These may help relieve symptoms for a time.    Having surgery. This may be an option if other treatments fail to relieve symptoms. In many cases, the surgeon removes the damaged tissue.  Possible complications of lateral epicondylitis  If the tendons involved don t heal properly, symptoms may return or get worse. To help prevent this, follow your treatment plan as directed.  When to call your healthcare provider  Call your healthcare provider right away if you have any of these:    Fever of 100.4 F (38 C)  or higher, or as directed    Redness, swelling, or warmth in the elbow or forearm that gets worse    Symptoms that don t get better with treatment, or get worse    New symptoms   Date Last Reviewed: 3/10/2016    4853-5698 [employer]. [address] [place] [address], [place] [address]. All rights reserved. This information is not intended as a substitute for professional medical care. Always follow your healthcare professional's instructions.

## 2018-08-10 NOTE — PROGRESS NOTES
"Nursing Notes:   Sangeetha Schaeffer LPN  8/10/2018  8:47 AM  Signed  Patient presents to the clinic for medication management.    Chief Complaint   Patient presents with     Recheck Medication       Initial There were no vitals taken for this visit. Estimated body mass index is 27.43 kg/(m^2) as calculated from the following:    Height as of 12/7/17: 5' 5.5\" (1.664 m).    Weight as of 12/7/17: 167 lb 6 oz (75.9 kg).  Medication Reconciliation: complete    Sangeetha Schaeffer LPN    Nursing note reviewed with patient.  Accurracy and completeness verified.   Ms. Del Rosario is a 62 year old female who:  Patient presents with:  Recheck Medication  Arm Pain    HPI     ICD-10-CM    1. Lateral epicondylitis of left elbow M77.12 ARM BAND UNIVERSAL   2. Left elbow pain M25.522 ARM BAND UNIVERSAL   3. Osteoporosis without current pathological fracture, unspecified osteoporosis type M81.0    4. Benign essential hypertension I10      Patient presents for follow-up of a number of issues.  She wants to do up Medicare wellness visit at some point.  We will get this arranged.    Today she is questions about her elbow, her bone density issues, her blood pressure.    States her left elbow pops and snaps.  She has pain laterally.  We talked about getting an arm band, she looked at hours here at the clinic but she would like to try getting one outside the clinic.  Home exercises reviewed and discussed.  information printed and reviewed with her as well.  Discussed physical therapy, at this time she will to try home exercises.  States she been having elbow pain for quite a while but she really does not take any pain medication for it besides acetaminophen.  Has not tried any topical muscle rubs or massage or warm packs.  Advise any of these would be fine.    Osteoporosis, currently following with endocrinology, evidently she is not very happy with them.  Her vitamin D was minimally elevated at 83.  See labs below.  She subsequently stopped her " vitamin D altogether she was taking 5000 international units daily.  Advised just to take it every other day for now and that should be fine we can recheck labs in a few months.  Start low-dose calcium supplement as well she does not really eat any milk or cheese she gets GI intolerances.  They are talking about starting some injectable therapies for her and she is quite scared about those.    Hypertension, currently well controlled.  Tolerating medication.  States that she is very anxious when she went to the endocrinologist and her blood pressures were quite high.    Patient still does a lot of working.  She does heavy lifting and pushing and pulling.  She will hear a lot of popping and snapping her left forearm especially when she rotates her pronates and supinates her arm.    Functional Capacity: >  4 METS.   Reports that she can climb a flight of stairs without any chest pain/heaviness or shortness of breath.   No orthopnea/paroxysmal nocturnal dyspnea  Review of Systems   Constitutional: Negative for chills, fatigue and fever.   HENT: Negative for congestion and hearing loss.    Eyes: Negative for pain and visual disturbance.   Respiratory: Negative for cough, shortness of breath and wheezing.    Cardiovascular: Negative for chest pain and palpitations.   Gastrointestinal: Negative for abdominal pain, diarrhea, nausea and vomiting.   Endocrine: Negative for cold intolerance and heat intolerance.   Genitourinary: Negative for dysuria and hematuria.   Musculoskeletal: Positive for arthralgias. Negative for myalgias.        + left elbow pain, outer.    Skin: Negative for pallor.   Allergic/Immunologic: Negative for immunocompromised state.   Neurological: Negative for dizziness and light-headedness.   Hematological: Does not bruise/bleed easily.   Psychiatric/Behavioral: Negative for agitation and confusion.      LAKIA:   LAKIA-7 SCORE 6/20/2017 12/7/2017 8/10/2018   Total Score 0 0 0     PHQ9:  PHQ-9 SCORE 6/20/2017  12/7/2017 8/10/2018   Total Score 0 0 0       I have personally reviewed the past medical history, past surgical history, medications, allergies, family and social history as listed below, on 8/10/2018.    Allergies   Allergen Reactions     Erythromycin Itching, Rash, Shortness Of Breath and Hives       Current Outpatient Prescriptions   Medication Sig Dispense Refill     acetaminophen (TYLENOL) 650 MG CR tablet Take 1 tablet by mouth every 8 hours as needed for pain Max acetaminophen dose: 4000 mg in 24 hrs       Blood Pressure Monitoring (SELF-TAKING BLOOD PRESSURE) KIT As directed. Dispense what insurance will cover       Elastic Bandages & Supports (MEDICAL COMPRESSION STOCKINGS) MISC As directed. 99 months       lisinopril-hydrochlorothiazide (PRINZIDE/ZESTORETIC) 10-12.5 MG per tablet Take 0.5 tablets by mouth daily Goal /80          Patient Active Problem List    Diagnosis Date Noted     Lateral epicondylitis of left elbow 08/10/2018     Priority: Medium     Left elbow pain 08/10/2018     Priority: Medium     Osteoporosis without current pathological fracture, unspecified osteoporosis type 08/10/2018     Priority: Medium     Allergy 01/26/2018     Priority: Medium     Overview:   Seasonal allergies       Personal history of malignant neoplasm of cervix uteri 01/26/2018     Priority: Medium     Overview:   History of cervical cancer with radiotherapy and cesium implants       Chronic anxiety 01/26/2018     Priority: Medium     History of peptic ulcer disease 01/26/2018     Priority: Medium     Overview:   History of positive H. pylori not treated       Benign essential hypertension 01/26/2018     Priority: Medium     Overview:        Age-related osteoporosis without current pathological fracture 06/20/2017     Priority: Medium     Chronic left SI joint pain 05/23/2017     Priority: Medium     Menopausal state 05/23/2017     Priority: Medium     Sciatica of left side 05/23/2017     Priority: Medium      Neuralgia, post-herpetic 2015     Priority: Medium     Mixed hyperlipidemia 2015     Priority: Medium     FHx: diabetes mellitus 2014     Priority: Medium     Colonoscopy refused 2014     Priority: Medium     Lymphedema 2014     Priority: Medium     Mammogram declined 2014     Priority: Medium     S/P radiation therapy 2014     Priority: Medium     Past Medical History:   Diagnosis Date     Non-Hodgkin lymphoma of extranodal and solid organ sites (H)     1991,Exploratory lap. retroperitoneal lymph node dissection     Personal history of irradiation     Radiation therapy, cesium implants for cervical cancer     Procedure not carried out because of patient's decision     2014     Procedure not carried out because of patient's decision     2014     Past Surgical History:   Procedure Laterality Date     COLONOSCOPY           LAPAROTOMY EXPLORATORY      Exploratory lap.retroperitoneal lymph node dissection     SALPINGO-OOPHORECTOMY BILATERAL      Removal of left tube and ovary     Social History     Social History     Marital status:      Spouse name: N/A     Number of children: N/A     Years of education: N/A     Social History Main Topics     Smoking status: Former Smoker     Packs/day: 0.25     Years: 15.00     Types: Cigarettes     Quit date: 1992     Smokeless tobacco: Never Used     Alcohol use No     Drug use: None      Comment: Drug use: No     Sexual activity: Not Asked     Other Topics Concern     None     Social History Narrative    Siblings Six brothers, 2 sisters  Children Rubina Sanchez TJ  Worked at [employer] - now has not worked for a while.    Lives by herself - Spouse  MVA age 23.  Has 3 children.  Previously smoked, one pack daily - quit about .  No regula    r exercise program.   Works in the garden, walks with daughter intermittently.   Previous insurance issues - no med coverage - stopped HTN medications  "intermittently in the past.     Family History   Problem Relation Age of Onset     HEART DISEASE Father      Heart Disease, at 57     Diabetes Mother      Diabetes,type II DM     Other - See Comments Mother Provider      at 89     HEART DISEASE Brother      Heart Disease, at 56     Diabetes Sister      Diabetes,Type II DM -  at about 73     Thyroid Disease Sister      Thyroid Disease       EXAM:   Vitals:    08/10/18 0840   BP: 136/88   BP Location: Right arm   Patient Position: Sitting   Cuff Size: Adult Regular   Pulse: 64   Weight: 168 lb 8 oz (76.4 kg)       Current Pain Score: Moderate Pain (4)     BP Readings from Last 3 Encounters:   08/10/18 136/88   17 131/72   17 128/78    Wt Readings from Last 3 Encounters:   08/10/18 168 lb 8 oz (76.4 kg)   17 167 lb 6 oz (75.9 kg)   17 166 lb 2 oz (75.4 kg)      Estimated body mass index is 27.61 kg/(m^2) as calculated from the following:    Height as of 17: 5' 5.5\" (1.664 m).    Weight as of this encounter: 168 lb 8 oz (76.4 kg).     Physical Exam   Constitutional: She appears well-developed and well-nourished. No distress.   HENT:   Head: Normocephalic and atraumatic.   Eyes: Conjunctivae are normal. No scleral icterus.   Neck: No thyromegaly present.   Cardiovascular: Normal rate and regular rhythm.    No carotid Bruits   Pulmonary/Chest: Effort normal. No respiratory distress. She has no wheezes.   Abdominal: Soft. There is no tenderness.   Musculoskeletal: She exhibits tenderness. She exhibits no edema or deformity.   + left elbow - lateral epidondylitis noted   Lymphadenopathy:     She has no cervical adenopathy.   Neurological: She is alert. No cranial nerve deficit.   Skin: Skin is warm and dry.   Psychiatric: She has a normal mood and affect.      INVESTIGATIONS:  Results for orders placed or performed during the hospital encounter of 18   MA Screen Bilateral w/David    Narrative    EXAM: MA SCREENING BILATERAL " BRIANA GARCIA, 7/19/2018 10:55 AM    COMPARISONS: 8/29/2016, 6/28/2005.    HISTORY: ; Screening breast examination Screening    FINDINGS: No suspicious abnormality. Benign calcifications.    BREAST DENSITY: Scattered fibroglandular densities.      Impression    IMPRESSION: BI-RADS CATEGORY: 2 - Benign.    RECOMMENDED FOLLOW-UP: Annual Mammography.      JULISA MCCLOUD MD       ASSESSMENT AND PLAN:  Problem List Items Addressed This Visit        Nervous and Auditory    Left elbow pain    Relevant Orders    ARM BAND UNIVERSAL (Completed)       Circulatory    Benign essential hypertension       Musculoskeletal and Integumentary    Lateral epicondylitis of left elbow - Primary    Relevant Orders    ARM BAND UNIVERSAL (Completed)    Osteoporosis without current pathological fracture, unspecified osteoporosis type        reviewed diet, exercise and weight control, recommended sodium restriction, cardiovascular risk and specific lipid/LDL goals reviewed  -- Expected clinical course discussed    -- Medications and their side effects discussed    Patient Instructions     Start calcium supplement daily or every other day.     Okay to restart Vitamin D supplement 5,000 international unit  -- just take every other day.     [facility]:  Vitamin D Total   Sodium   Potassium   Chloride   Carbon Dioxide   Anion Gap   Glucose   Creatinine   Glomerular Filtration Rate   Blood Urea Nitrogen   Albumin   Phosphorus   Calcium       83 (H) 33   139 139   4.0 4.4   103 104   29 28   7.0 7.0   96 88   0.89 0.80   >60 >60   13 15   4.1 3.6   3.9 3.5   10.2 9.9     Immunization History   Administered Date(s) Administered     TDAP Vaccine (Boostrix) 02/19/2009        Pneumococcal Pneumonia vaccines (PCV 13 and PCV 23)     Pneumococcal Conjugate 13 - Valent Vaccine (One time only).     Pneumococcal 23 - Valent Vaccine -- Two doses (One before age 65 and One After)    -- repeat every 5 years in certain patient populations.     PCV  13should be given prior to PCV 23 -- THEN -- In eight weeks or more, PCV 23 can be given.   If the patient has already received PCV 23, they should not receive PCV 13 for one year.     Do the 3 arm exercises regularly..... About 10 of each activity, then switch. (30 total) -- few times daily.... Increase as tolerated.     Return as needed for follow-up with Dr. Fitch.    - Schedule Welcome to Medicare Wellness Visit with Janice Benites NP.     Clinic : [phone]  Appointment line: [phone]    Treating Tennis Elbow    Your treatment will depend on how inflamed your tendon is. The goal is to relieve your symptoms and help you regain full use of your elbow.  Rest and medicine  Wearing a tennis elbow splint allows the inflamed tendon to rest. It must be worn properly. It should be placed down the arm past the painful area of the elbow. If it is directly over the inflamed tendon, it can worsen the symptoms. This brace can help the tendon heal. Using your other hand or changing your  also takes stress off the tendon. Oral nonsteroidal anti-inflammatory medicines (NSAIDs) and ice can relieve pain and reduce swelling.  Exercises and therapy  Your healthcare provider may give you an exercise program. He or she may refer you to a physical therapist. The physical therapist will teach you how to gently stretch and strengthen the muscles around your elbow.  Anti-inflammatory injections  Your healthcare provider may give you injections of an anti-inflammatory medicine, such as cortisone. This helps reduce swelling. You may have more pain at first. But in a few days, your elbow should feel better.  If surgery is needed  If your symptoms persist for a long time, or other treatments don t work, your healthcare provider may recommend surgery. Surgery repairs the inflamed tendon.   Date Last Reviewed: 1/1/2018 2000-2017 [employer]. [address] [place] [address], [place] [address]. All rights reserved.  This information is not intended as a substitute for professional medical care. Always follow your healthcare professional's instructions.        Understanding Lateral Epicondylitis    Tendons are strong bands of tissue that connect muscles to bones. Lateral epicondylitis affects the tendons that connect muscles in the forearm to the lateral epicondyle. This is the bony knob on the outer side of the elbow. The condition occurs if the extensor tendons of the wrist become red and swollen (irritated). This can cause pain in the elbow, forearm, and wrist. Because the condition is sometimes caused by playing tennis, it is also known as  tennis elbow.   How to say it  [id number]   What causes lateral epicondylitis?  The condition most often occurs because of overuse. This can be from any activity that repeatedly puts stress on the forearm extensor muscles or tendons and wrist. For instance, playing tennis, lifting weights, cutting meat, painting, and typing can all cause the condition. Wear and tear of the tendons from aging or an injury to the tendons can also cause the condition.  Symptoms of lateral epicondylitis  The most common symptom is pain. You may feel it on the outer side of the elbow and down the back of the forearm. It may be worse when moving or using the elbow, forearm, or wrist. You may also feel pain when gripping or lifting things.  Treatment for lateral epicondylitis  Treatments may include:    Resting the elbow, forearm, and wrist. You ll need to avoid movements that can make your symptoms worse. You also may need to avoid certain sports and types of work for a time. This helps relieve symptoms and prevent further damage to the tendons.    Changing the action that caused the problem. For instance, if the tendons were damaged from playing tennis, it may help to change your playing technique or use different equipment. This helps prevent further damage to the tendons.    Using cold  packs. Putting an ice pack on the injured area can help reduce pain and swelling.    Taking pain medicines. Taking prescription or over-the-counter pain medicines may help reduce pain and swelling.      Wearing a brace. This helps reduce strain on the muscles and tendons in the forearm, which may relieve symptoms. It is very important to wear the brace properly.    Doing exercises and physical therapy. These help improve strength and range of motion in the elbow, forearm, and wrist.    Getting shots of medicine into the injured area. These may help relieve symptoms for a time.    Having surgery. This may be an option if other treatments fail to relieve symptoms. In many cases, the surgeon removes the damaged tissue.  Possible complications of lateral epicondylitis  If the tendons involved don t heal properly, symptoms may return or get worse. To help prevent this, follow your treatment plan as directed.  When to call your healthcare provider  Call your healthcare provider right away if you have any of these:    Fever of 100.4 F (38 C) or higher, or as directed    Redness, swelling, or warmth in the elbow or forearm that gets worse    Symptoms that don t get better with treatment, or get worse    New symptoms   Date Last Reviewed: 3/10/2016    [id number] [employer]. [address] [place] [address]. All rights reserved. This information is not intended as a substitute for professional medical care. Always follow your healthcare professional's instructions.          Chris Fitch MD  Internal Medicine  [facility]

## 2018-08-11 ASSESSMENT — ANXIETY QUESTIONNAIRES: GAD7 TOTAL SCORE: 0

## 2018-08-11 ASSESSMENT — PATIENT HEALTH QUESTIONNAIRE - PHQ9: SUM OF ALL RESPONSES TO PHQ QUESTIONS 1-9: 0

## 2018-08-23 ENCOUNTER — HOSPITAL ENCOUNTER (OUTPATIENT)
Dept: GENERAL RADIOLOGY | Facility: OTHER | Age: 63
Discharge: HOME | End: 2018-08-23
Attending: NURSE PRACTITIONER | Admitting: NURSE PRACTITIONER
Payer: MEDICARE

## 2018-08-23 ENCOUNTER — OFFICE VISIT (OUTPATIENT)
Dept: FAMILY MEDICINE | Facility: OTHER | Age: 63
End: 2018-08-23
Attending: NURSE PRACTITIONER
Payer: MEDICARE

## 2018-08-23 VITALS — SYSTOLIC BLOOD PRESSURE: 132 MMHG | HEART RATE: 70 BPM | DIASTOLIC BLOOD PRESSURE: 82 MMHG | TEMPERATURE: 98 F

## 2018-08-23 DIAGNOSIS — M25.562 ACUTE PAIN OF LEFT KNEE: Primary | ICD-10-CM

## 2018-08-23 PROCEDURE — G0463 HOSPITAL OUTPT CLINIC VISIT: HCPCS | Mod: 25

## 2018-08-23 PROCEDURE — 99213 OFFICE O/P EST LOW 20 MIN: CPT | Performed by: NURSE PRACTITIONER

## 2018-08-23 PROCEDURE — G0463 HOSPITAL OUTPT CLINIC VISIT: HCPCS

## 2018-08-23 PROCEDURE — 73562 X-RAY EXAM OF KNEE 3: CPT | Mod: RT

## 2018-08-23 RX ORDER — LISINOPRIL/HYDROCHLOROTHIAZIDE 10-12.5 MG
1 TABLET ORAL DAILY
COMMUNITY
End: 2019-12-19

## 2018-08-23 ASSESSMENT — PAIN SCALES - GENERAL: PAINLEVEL: MODERATE PAIN (5)

## 2018-08-23 NOTE — MR AVS SNAPSHOT
After Visit Summary   8/23/2018    Eloisa Del Rosario    MRN: [id number]           Patient Information     Date Of Birth          1955        Visit Information        Provider Department      8/23/2018 10:30 AM Danna Babb NP [facility]        Today's Diagnoses     Acute pain of left knee    -  1      Care Instructions      Knee Pain with Uncertain Cause    There are several common causes for knee pain. These can include:    A sprain of the ligaments that support the joint    An injury to the cartilage lining of the joint    Arthritis from wear-and-tear or inflammation  There are other causes as well. There may also be swelling, reduced movement of the knee joint, and pain with walking. A definite diagnosis will still need to be made. If your symptoms do not improve, further follow-up and testing may be needed.  Home care    Stay off the injured leg as much as possible until pain improves.    Apply an ice pack over the injured area for 15 to 20 minutes every 3 to 6 hours. You should do this for the first 24 to 48 hours. You can make an ice pack by filling a plastic bag that seals at the top with ice cubes and then wrapping it with a thin towel. Continue to use ice packs for relief of pain and swelling as needed. As the ice melts, be careful to avoid getting your wrap, splint, or cast wet. After 48 hours, apply heat (warm shower or warm bath) for 15 to 20 minutes several times a day, or alternate ice and heat. If you have to wear a hook-and-loop knee brace, you can open it to apply the ice pack, or heat, directly to the knee. Never put ice directly on the skin. Always wrap the ice in a towel or other type of cloth.    You may use over-the-counter pain medicine to control pain, unless another pain medicine was prescribed. If you have chronic liver or kidney disease or ever had a stomach ulcer or GI bleeding, talk with your healthcare provider using these medicines.    If  crutches or a walker have been recommended, do not put weight on the injured leg until you can do so without pain. Check with your healthcare provider before returning to sports or full work duties.    If you have a hook-and-loop knee brace, you can remove it to bathe and sleep, unless told otherwise.  Follow-up care  Follow up with your healthcare provider as advised. This is usually within 1 to 2 weeks.  If X-rays were taken, you will be told of any new findings that may affect your care.  Call 911  Call 911 if you have:    Shortness of breath    Chest pain  When to seek medical advice  Call your healthcare provider right away if any of these occur:    Toes or foot becomes swollen, cold, blue, numb, or tingly    Pain or swelling spreads over the knee or calf    Warmth or redness appears over the knee or calf    Other joints become painful    Rash appears    Fever of 100.4 F (38 C) or higher, or as directed by your healthcare provider      Tylenol 650-1000 mg every 6-8 hours    Ibuprofen 600 mg every 6 hours as needed for pain    Ice 20 minutes on, 3-6 times a day    After day three of injury can use heat 20 minutes on 3-6 times a day.     Braces, ace bandages are ok to use, usually if not broken they are used for 7-10 days.                   Follow-ups after your visit        Follow-up notes from your care team     Return if symptoms worsen or fail to improve.      Who to contact     If you have questions or need follow up information about today's clinic visit or your schedule please contact [facility] directly at [phone].  Normal or non-critical lab and imaging results will be communicated to you by MyChart, letter or phone within 4 business days after the clinic has received the results. If you do not hear from us within 7 days, please contact the clinic through MyChart or phone. If you have a critical or abnormal lab result, we will notify you by phone as soon as possible.  Submit  "refill requests through [employer] or call your pharmacy and they will forward the refill request to us. Please allow 3 business days for your refill to be completed.          Additional Information About Your Visit        [employer] Information     [employer] lets you send messages to your doctor, view your test results, renew your prescriptions, schedule appointments and more. To sign up, go to [email or web address] . Click on \"Log in\" on the left side of the screen, which will take you to the Welcome page. Then click on \"Sign up Now\" on the right side of the page.     You will be asked to enter the access code listed below, as well as some personal information. Please follow the directions to create your username and password.     Your access code is: [email or web address]  Expires: 2018 11:52 AM     Your access code will  in 90 days. If you need help or a new code, please call your [facility] clinic or [phone].        Care EveryWhere ID     This is your Care EveryWhere ID. This could be used by other organizations to access your [facility] medical records  [id number]        Your Vitals Were     Pulse Temperature Breastfeeding?             70 98  F (36.7  C) (Tympanic) No          Blood Pressure from Last 3 Encounters:   18 132/82    Weight from Last 3 Encounters:   No data found for Wt              We Performed the Following     XR Knee Right 3 Views          Today's Medication Changes          These changes are accurate as of 18 11:52 AM.  If you have any questions, ask your nurse or doctor.               Start taking these medicines.        Dose/Directions    order for DME   Used for:  Acute pain of left knee   Started by:  Danna Babb NP        Equipment being ordered: Knee brace   Quantity:  1 Device   Refills:  0            Where to get your medicines      Some of these will need a paper prescription and others can be bought over the counter.  Ask your nurse if you have questions.     Bring a " paper prescription for each of these medications     order for DME                Primary Care Provider Office Phone # Fax #    Chris Fitch [phone] [address] [facility] [address]        Equal Access to Services     MARIELA STATON : Kimberly kimbrough hernano Soomaali, waaxda luqadaha, qaybta kaalmada adesamanthada, waxbhumi chris vladimirsujata castillonelsyamilcar mcginnis. So [facility] [phone].    ATENCIÓN: Si habla español, tiene a patterson disposición servicios gratuitos de asistencia lingüística. Llame al [phone].    We comply with applicable federal civil rights laws and [place] laws. We do not discriminate on the basis of race, color, national origin, age, disability, sex, sexual orientation, or gender identity.            Thank you!     Thank you for choosing [facility]  for your care. Our goal is always to provide you with excellent care. Hearing back from our patients is one way we can continue to improve our services. Please take a few minutes to complete the written survey that you may receive in the mail after your visit with us. Thank you!             Your Updated Medication List - Protect others around you: Learn how to safely use, store and throw away your medicines at www.disposemymeds.org.          This list is accurate as of 8/23/18 11:52 AM.  Always use your most recent med list.                   Brand Name Dispense Instructions for use Diagnosis    lisinopril-hydrochlorothiazide 10-12.5 MG per tablet    PRINZIDE/ZESTORETIC     Take 1 tablet by mouth daily        order for DME     1 Device    Equipment being ordered: Knee brace    Acute pain of left knee

## 2018-08-23 NOTE — PROGRESS NOTES
"Nursing Notes:   Yina Kelley LPN  8/23/2018 11:12 AM  Signed  Patient presents to the clinic for left knee injury. States about a week ago is when her knee started hurting. Unsure of any fall or injury to the knee. The most she can think is that she is wearing her compression stockings on wrong. Very painful, can still bend her knee but can hardly bear weight for long periods of times.   Yina Kelley LPN............. August 23, 2018 11:01 AM     Chief Complaint   Patient presents with     Knee Pain       Initial Temp 98  F (36.7  C) (Tympanic)  Breastfeeding? No There is no height or weight on file to calculate BMI.  Medication Reconciliation: complete    Yina Kelley LPN     SUBJECTIVE:   Eloisa Del Rosario is a 62 year old female who presents to clinic today for the following health issues:    Musculoskeletal problem/pain      Duration: 1 week ago, knee started hurting.     Description  Location: LT knee    Intensity:  moderate, 5/10    Accompanying signs and symptoms: swelling, no redness, no bruised areas    History  Previous similar problem: no   Previous evaluation:  none    Precipitating or alleviating factors:  Trauma or overuse: no   Aggravating factors include: sitting, standing, walking, climbing stairs, lifting and overuse    Therapies tried and outcome: rest/inactivity, ice and Ibuprofen      Problem list and histories reviewed & adjusted, as indicated.  Additional history: as documented    Current Outpatient Prescriptions   Medication Sig Dispense Refill     lisinopril-hydrochlorothiazide (PRINZIDE/ZESTORETIC) 10-12.5 MG per tablet Take 1 tablet by mouth daily       Allergies   Allergen Reactions     No Clinical Screening - See Comments Anaphylaxis     Cannot have any \"myacin's\".          ROS:  Notable findings in the HPI.       OBJECTIVE:     /82 (BP Location: Left arm, Patient Position: Sitting, Cuff Size: Adult Regular)  Pulse 70  Temp 98  F (36.7  C) (Tympanic)  " Breastfeeding? No  There is no height or weight on file to calculate BMI.  GENERAL: healthy, alert and no distress  EYES: Eyes grossly normal to inspection  HENT: normal cephalic/atraumatic and oral mucous membranes moist  RESP: without increased work of breathing.   MS: normal muscle tone, normal range of motion, no edema, tenderness to palpation Medial side and gait normal, no ataxia. LT Knee reveals full range of motion, No masses, effusion or ligamentous instability.  SKIN: no suspicious lesions or rashes    Diagnostic Test Results:  Results for orders placed or performed in visit on 08/23/18 (from the past 24 hour(s))   XR Knee Right 3 Views    Narrative    PROCEDURE:  XR KNEE RT 3 VW    HISTORY: ; Acute pain of left knee    COMPARISON:  None.    TECHNIQUE:  3 views of the left knee were obtained.    FINDINGS:  No fracture or dislocation is identified. The joint spaces  are preserved.  There is a small joint effusion.      Impression    IMPRESSION: No acute fracture.      JAY LAZARO MD     Completed Knee xray.  I personally reviewed the xray. There was no acute fractures, or DJD noted upon initial read of xray.  Final read pending by radiology.    ASSESSMENT/PLAN:     1. Acute pain of left knee  - XR Knee Right 3 Views  - order for DME; Equipment being ordered: Knee brace  Dispense: 1 Device; Refill: 0      PLAN:    MS Injury/Pain  ice, heat, elevate, rest, splint: Knee brace for a week, Tylenol and Ibuprofen  F/U in a week with PCP or ortho.     Followup:    If not improving or if condition worsens, follow up with your Primary Care Provider    I explained my diagnostic considerations and recommendations to the patient, who voiced understanding and agreement with the treatment plan. All questions were answered. We discussed potential side effects of any prescribed or recommended therapies, as well as expectations for response to treatments. She was advised to contact PCP office if there is no improvement or  worsening of conditions or symptoms.  If s/s worsen or persist, patient will either come back or follow up with PCP.    Disclaimer:  This note consists of words and symbols derived from keyboarding, dictation, or using voice recognition software. As a result, there may be errors in the script that have gone undetected. Please consider this when interpreting information found in this note.      Danna Babb NP, 8/23/2018 11:03 AM

## 2018-08-23 NOTE — NURSING NOTE
Patient presents to the clinic for left knee injury. States about a week ago is when her knee started hurting. Unsure of any fall or injury to the knee. The most she can think is that she is wearing her compression stockings on wrong. Very painful, can still bend her knee but can hardly bear weight for long periods of times.   Yina Kelley LPN............. August 23, 2018 11:01 AM     Chief Complaint   Patient presents with     Knee Pain       Initial Temp 98  F (36.7  C) (Tympanic)  Breastfeeding? No There is no height or weight on file to calculate BMI.  Medication Reconciliation: complete    Yina Kelley LPN

## 2018-09-04 ENCOUNTER — HEALTH MAINTENANCE LETTER (OUTPATIENT)
Age: 63
End: 2018-09-04

## 2018-09-17 ENCOUNTER — TELEPHONE (OUTPATIENT)
Dept: INTERNAL MEDICINE | Facility: OTHER | Age: 63
End: 2018-09-17

## 2018-09-17 DIAGNOSIS — I89.0 LYMPHEDEMA OF BOTH LOWER EXTREMITIES: Primary | ICD-10-CM

## 2018-09-17 NOTE — TELEPHONE ENCOUNTER
Patient has been getting compression nylons.  She had been getting them at  [facility] but now needs new script to get them elsewhere.    Liv Rouse on 9/17/2018 at 12:01 PM

## 2018-09-18 NOTE — TELEPHONE ENCOUNTER
Voicemail has not been set up yet, unable to leave a message.      Sangeteha Schaeffer LPN 9/18/2018 9:55 AM

## 2018-09-20 PROBLEM — I89.0 LYMPHEDEMA OF BOTH LOWER EXTREMITIES: Status: ACTIVE | Noted: 2018-09-20

## 2018-12-17 ENCOUNTER — OFFICE VISIT (OUTPATIENT)
Dept: INTERNAL MEDICINE | Facility: OTHER | Age: 63
End: 2018-12-17
Attending: INTERNAL MEDICINE
Payer: MEDICARE

## 2018-12-17 VITALS
SYSTOLIC BLOOD PRESSURE: 132 MMHG | BODY MASS INDEX: 27.53 KG/M2 | WEIGHT: 168 LBS | DIASTOLIC BLOOD PRESSURE: 84 MMHG | HEART RATE: 68 BPM

## 2018-12-17 DIAGNOSIS — I10 BENIGN ESSENTIAL HYPERTENSION: Primary | ICD-10-CM

## 2018-12-17 DIAGNOSIS — H91.91 HEARING DIFFICULTY OF RIGHT EAR: ICD-10-CM

## 2018-12-17 PROCEDURE — G0463 HOSPITAL OUTPT CLINIC VISIT: HCPCS

## 2018-12-17 PROCEDURE — 99214 OFFICE O/P EST MOD 30 MIN: CPT | Performed by: INTERNAL MEDICINE

## 2018-12-17 RX ORDER — LISINOPRIL/HYDROCHLOROTHIAZIDE 10-12.5 MG
.5-1 TABLET ORAL DAILY
Qty: 90 TABLET | Refills: 3 | Status: SHIPPED | OUTPATIENT
Start: 2018-12-17 | End: 2019-12-19

## 2018-12-17 RX ORDER — LISINOPRIL/HYDROCHLOROTHIAZIDE 10-12.5 MG
0.5 TABLET ORAL DAILY
Qty: 15 TABLET | Refills: 0 | OUTPATIENT
Start: 2018-12-17

## 2018-12-17 ASSESSMENT — ANXIETY QUESTIONNAIRES
IF YOU CHECKED OFF ANY PROBLEMS ON THIS QUESTIONNAIRE, HOW DIFFICULT HAVE THESE PROBLEMS MADE IT FOR YOU TO DO YOUR WORK, TAKE CARE OF THINGS AT HOME, OR GET ALONG WITH OTHER PEOPLE: NOT DIFFICULT AT ALL
2. NOT BEING ABLE TO STOP OR CONTROL WORRYING: NOT AT ALL
6. BECOMING EASILY ANNOYED OR IRRITABLE: NOT AT ALL
3. WORRYING TOO MUCH ABOUT DIFFERENT THINGS: NOT AT ALL
GAD7 TOTAL SCORE: 0
1. FEELING NERVOUS, ANXIOUS, OR ON EDGE: NOT AT ALL
7. FEELING AFRAID AS IF SOMETHING AWFUL MIGHT HAPPEN: NOT AT ALL
5. BEING SO RESTLESS THAT IT IS HARD TO SIT STILL: NOT AT ALL

## 2018-12-17 ASSESSMENT — ENCOUNTER SYMPTOMS
CHEST TIGHTNESS: 0
SHORTNESS OF BREATH: 0
HEMATURIA: 0
ADENOPATHY: 0
BACK PAIN: 0
WOUND: 0
ABDOMINAL PAIN: 0
CHILLS: 0
FEVER: 0
CONFUSION: 0
BRUISES/BLEEDS EASILY: 0

## 2018-12-17 ASSESSMENT — PATIENT HEALTH QUESTIONNAIRE - PHQ9
SUM OF ALL RESPONSES TO PHQ QUESTIONS 1-9: 0
5. POOR APPETITE OR OVEREATING: NOT AT ALL

## 2018-12-17 ASSESSMENT — PAIN SCALES - GENERAL: PAINLEVEL: MILD PAIN (3)

## 2018-12-17 NOTE — PATIENT INSTRUCTIONS
Last labs looked good.     Meds refilled.     Audiology referral sent  - they will call with date/time of appointment.      Return in approximately 1 year, or sooner as needed for follow-up with Dr. Fitch.  - Annual Follow-up / Physical    Clinic : [phone]  Appointment line: [phone]

## 2018-12-17 NOTE — PROGRESS NOTES
"Nursing Notes:   Sangeetha Schaeffer LPN  12/17/2018 11:46 AM  Signed  Patient presents to the clinic for follow up with hypertension, refill pending.      Chief Complaint   Patient presents with     Clinic Care Coordination - Follow-up       Initial There were no vitals taken for this visit. Estimated body mass index is 27.61 kg/m  as calculated from the following:    Height as of 12/7/17: 1.664 m (5' 5.5\").    Weight as of 8/10/18: 76.4 kg (168 lb 8 oz).  Medication Reconciliation: complete    Sangeetha Schaeffer LPN    Nursing note reviewed with patient.  Accuracy and completeness verified.   Ms. Del Rosario is a 63 year old female who:  Patient presents with:  Clinic Care Coordination - Follow-up      ICD-10-CM    1. Benign essential hypertension I10 lisinopril-hydrochlorothiazide (PRINZIDE/ZESTORETIC) 10-12.5 MG tablet   2. Hearing difficulty of right ear H91.91 AUDIOLOGY ADULT REFERRAL     HPI  Hypertension, currently well controlled.  Tolerating medication.  Does have some leg swelling issues at times but states that this is due to lymphedema.  Seems to be doing well with current medication regimen.  Taking half tablet daily.  Advised if needed, for high blood pressures or leg swelling issues, would consider increasing up to 1 full tablet daily.  Med list updated.  Refill sent to pharmacy.    Still having some issues with hearing in her right ear.  She would like to see audiology.    Last labs were stable.  She declines labs today.    Functional Capacity: > 4 METS.   Reports that she can climb a flight of stairs without any chest pain/heaviness or shortness of breath.   No orthopnea/paroxysmal nocturnal dyspnea  Review of Systems   Constitutional: Negative for chills and fever.   HENT: Positive for hearing loss. Negative for congestion.    Eyes: Negative for visual disturbance.   Respiratory: Negative for chest tightness and shortness of breath.    Cardiovascular: Positive for leg swelling (+ lymphedema of legs). " Negative for chest pain.   Gastrointestinal: Negative for abdominal pain.   Genitourinary: Negative for hematuria.   Musculoskeletal: Negative for back pain.   Skin: Negative for rash and wound.   Neurological: Negative for syncope.   Hematological: Negative for adenopathy. Does not bruise/bleed easily.   Psychiatric/Behavioral: Negative for confusion.        LAKIA:   LAKIA-7 SCORE 12/7/2017 8/10/2018 12/17/2018   Total Score 0 0 0     PHQ9:  PHQ-9 SCORE 12/7/2017 8/10/2018 12/17/2018   PHQ-9 Total Score 0 0 0       I have personally reviewed the past medical history, past surgical history, medications, allergies, family and social history as listed below, on 12/17/2018.    Allergies   Allergen Reactions     Erythromycin Itching, Rash, Shortness Of Breath and Hives       Current Outpatient Medications   Medication Sig Dispense Refill     acetaminophen (TYLENOL) 650 MG CR tablet Take 1 tablet by mouth every 8 hours as needed for pain Max acetaminophen dose: 4000 mg in 24 hrs       Blood Pressure Monitoring (SELF-TAKING BLOOD PRESSURE) KIT As directed. Dispense what insurance will cover       Elastic Bandages & Supports (MEDICAL COMPRESSION STOCKINGS) MISC As directed. 99 months       lisinopril-hydrochlorothiazide (PRINZIDE/ZESTORETIC) 10-12.5 MG tablet Take 0.5-1 tablets by mouth daily Goal /80 90 tablet 3        Patient Active Problem List    Diagnosis Date Noted     Hearing difficulty of right ear 12/17/2018     Priority: Medium     Lateral epicondylitis of left elbow 08/10/2018     Priority: Medium     Left elbow pain 08/10/2018     Priority: Medium     Osteoporosis without current pathological fracture, unspecified osteoporosis type 08/10/2018     Priority: Medium     Allergy 01/26/2018     Priority: Medium     Overview:   Seasonal allergies       Personal history of malignant neoplasm of cervix uteri 01/26/2018     Priority: Medium     Overview:   History of cervical cancer with radiotherapy and cesium  implants       Chronic anxiety 01/26/2018     Priority: Medium     History of peptic ulcer disease 01/26/2018     Priority: Medium     Overview:   History of positive H. pylori not treated       Benign essential hypertension 01/26/2018     Priority: Medium     Overview:        Age-related osteoporosis without current pathological fracture 06/20/2017     Priority: Medium     Chronic left SI joint pain 05/23/2017     Priority: Medium     Menopausal state 05/23/2017     Priority: Medium     Sciatica of left side 05/23/2017     Priority: Medium     Neuralgia, post-herpetic 12/11/2015     Priority: Medium     Mixed hyperlipidemia 01/13/2015     Priority: Medium     FHx: diabetes mellitus 06/04/2014     Priority: Medium     Colonoscopy refused 06/04/2014     Priority: Medium     Lymphedema 06/04/2014     Priority: Medium     Mammogram declined 06/04/2014     Priority: Medium     S/P radiation therapy 06/04/2014     Priority: Medium     Past Medical History:   Diagnosis Date     Non-Hodgkin lymphoma of extranodal and solid organ sites (H)     05/28/1991,Exploratory lap. retroperitoneal lymph node dissection     Personal history of irradiation     Radiation therapy, cesium implants for cervical cancer     Past Surgical History:   Procedure Laterality Date     COLONOSCOPY      1991     LAPAROTOMY EXPLORATORY      Exploratory lap.retroperitoneal lymph node dissection     SALPINGO-OOPHORECTOMY BILATERAL      Removal of left tube and ovary     Social History     Socioeconomic History     Marital status:      Spouse name: None     Number of children: None     Years of education: None     Highest education level: None   Social Needs     Financial resource strain: None     Food insecurity - worry: None     Food insecurity - inability: None     Transportation needs - medical: None     Transportation needs - non-medical: None   Occupational History     None   Tobacco Use     Smoking status: Former Smoker     Packs/day: 0.25  "    Years: 15.00     Pack years: 3.75     Types: Cigarettes     Last attempt to quit: 1992     Years since quittin.9     Smokeless tobacco: Never Used   Substance and Sexual Activity     Alcohol use: No     Alcohol/week: 0.0 oz     Drug use: Yes     Sexual activity: No   Other Topics Concern     None   Social History Narrative    Siblings Six brothers, 2 sisters  Children Rubina Sanchez TJ  Worked at [employer] - now has not worked for a while.    Lives by herself - Spouse  MVA age 23.  Has 3 children.  Previously smoked, one pack daily - quit about .  No regula    r exercise program.   Works in the garden, walks with daughter intermittently.   Previous insurance issues - no med coverage - stopped HTN medications intermittently in the past.     Family History   Problem Relation Age of Onset     Heart Disease Father         Heart Disease, at 57     Diabetes Mother         Diabetes,type II DM     Other - See Comments Mother Provider         at 89     Diabetes Sister         Diabetes,Type II DM -  at about 73     Thyroid Disease Sister         Thyroid Disease     Heart Disease Brother         Heart Disease, at 56       EXAM:   Vitals:    18 1133   BP: 132/84   BP Location: Right arm   Patient Position: Sitting   Cuff Size: Adult Regular   Pulse: 68   Weight: 76.2 kg (168 lb)       Current Pain Score: Mild Pain (3)     BP Readings from Last 3 Encounters:   18 132/84   08/10/18 136/88   17 131/72      Wt Readings from Last 3 Encounters:   18 76.2 kg (168 lb)   08/10/18 76.4 kg (168 lb 8 oz)   17 75.9 kg (167 lb 6 oz)      Estimated body mass index is 27.53 kg/m  as calculated from the following:    Height as of 17: 1.664 m (5' 5.5\").    Weight as of this encounter: 76.2 kg (168 lb).     Physical Exam   Constitutional: She appears well-developed and well-nourished. No distress.   HENT:   Head: Normocephalic and atraumatic.   Eyes: Conjunctivae " are normal. No scleral icterus.   Neck: Neck supple.   Cardiovascular: Normal rate and regular rhythm.   Pulmonary/Chest: Effort normal.   Abdominal: Soft. There is no tenderness.   Musculoskeletal: She exhibits edema. She exhibits no deformity.   Lymphadenopathy:     She has no cervical adenopathy.   Neurological: She is alert.   Skin: Skin is warm and dry. No rash noted. She is not diaphoretic.   Psychiatric: She has a normal mood and affect.      Procedures   INVESTIGATIONS:  Results for orders placed or performed during the hospital encounter of 07/19/18   MA Screen Bilateral w/Radha    Narrative    EXAM: MA SCREENING BILATERAL W/ RADHA, 7/19/2018 10:55 AM    COMPARISONS: 8/29/2016, 6/28/2005.    HISTORY: ; Screening breast examination Screening    FINDINGS: No suspicious abnormality. Benign calcifications.    BREAST DENSITY: Scattered fibroglandular densities.      Impression    IMPRESSION: BI-RADS CATEGORY: 2 - Benign.    RECOMMENDED FOLLOW-UP: Annual Mammography.      JULISA MCCLOUD MD       ASSESSMENT AND PLAN:  Problem List Items Addressed This Visit        Nervous and Auditory    Hearing difficulty of right ear    Relevant Orders    AUDIOLOGY ADULT REFERRAL       Circulatory    Benign essential hypertension - Primary    Relevant Medications    lisinopril-hydrochlorothiazide (PRINZIDE/ZESTORETIC) 10-12.5 MG tablet        reviewed diet, exercise and weight control, recommended sodium restriction  -- Expected clinical course discussed    -- Medications and their side effects discussed      Patient Instructions   Last labs looked good.     Meds refilled.     Audiology referral sent  - they will call with date/time of appointment.      Return in approximately 1 year, or sooner as needed for follow-up with Dr. Fitch.  - Annual Follow-up / Physical    Clinic : [phone]  Appointment line: [phone]      Chris Fitch MD  Internal Medicine  [facility]     Portions of this  note were dictated using speech recognition software. The note has been proofread but errors in the text may have been overlooked. Please contact me if there are any concerns regarding the accuracy of the dictation.

## 2018-12-17 NOTE — NURSING NOTE
"Patient presents to the clinic for follow up with hypertension, refill pending.      Chief Complaint   Patient presents with     Clinic Care Coordination - Follow-up       Initial There were no vitals taken for this visit. Estimated body mass index is 27.61 kg/m  as calculated from the following:    Height as of 12/7/17: 1.664 m (5' 5.5\").    Weight as of 8/10/18: 76.4 kg (168 lb 8 oz).  Medication Reconciliation: complete    Sangeetha Schaeffer LPN    "

## 2018-12-17 NOTE — TELEPHONE ENCOUNTER
Called and spoke with patient. She is low on her Lisinopril medication. She will be out in 4 days. Patient would like temporary refill. I transferred her to scheduling to make an appointment.     Tiffany Pickett LPN on 12/17/2018 at 9:45 AM

## 2018-12-18 RX ORDER — LISINOPRIL/HYDROCHLOROTHIAZIDE 10-12.5 MG
TABLET ORAL
Qty: 90 TABLET | Refills: 0 | OUTPATIENT
Start: 2018-12-18

## 2018-12-18 ASSESSMENT — ANXIETY QUESTIONNAIRES: GAD7 TOTAL SCORE: 0

## 2018-12-18 NOTE — TELEPHONE ENCOUNTER
"See other chart medication already refilled   \"[facility]\" sent.    Karla De Luna RN on 12/18/2018 at 9:28 AM    "

## 2019-03-11 ENCOUNTER — TELEPHONE (OUTPATIENT)
Dept: INTERNAL MEDICINE | Facility: OTHER | Age: 64
End: 2019-03-11

## 2019-03-11 DIAGNOSIS — I89.0 LYMPHEDEMA OF BOTH LOWER EXTREMITIES: ICD-10-CM

## 2019-03-11 NOTE — TELEPHONE ENCOUNTER
Patient has lympademia in both legs.    She's worn compression stocking for years but lately they just seem way too tight.    Currently she's usin-40 compression right now.  She's wondering if she should use a lower compression?     She uses thigh high stockings.    She gets them from [employer].    What do you recommend?    Latasha Oconnor LPN  3/11/2019  9:36 AM

## 2019-03-11 NOTE — TELEPHONE ENCOUNTER
Patient notified of providers note.  Signed prescription was faxed to [facility].  Sangeetha Schaeffer LPN        3/11/2019 10:38 AM

## 2019-06-03 ENCOUNTER — TRANSFERRED RECORDS (OUTPATIENT)
Dept: HEALTH INFORMATION MANAGEMENT | Facility: OTHER | Age: 64
End: 2019-06-03

## 2019-06-25 ENCOUNTER — TRANSFERRED RECORDS (OUTPATIENT)
Dept: HEALTH INFORMATION MANAGEMENT | Facility: OTHER | Age: 64
End: 2019-06-25

## 2019-10-03 ENCOUNTER — HEALTH MAINTENANCE LETTER (OUTPATIENT)
Age: 64
End: 2019-10-03

## 2019-12-18 PROBLEM — M77.12 LATERAL EPICONDYLITIS OF LEFT ELBOW: Status: RESOLVED | Noted: 2018-08-10 | Resolved: 2019-12-18

## 2019-12-18 PROBLEM — M81.0 OSTEOPOROSIS WITHOUT CURRENT PATHOLOGICAL FRACTURE, UNSPECIFIED OSTEOPOROSIS TYPE: Status: RESOLVED | Noted: 2018-08-10 | Resolved: 2019-12-18

## 2019-12-18 PROBLEM — M25.522 LEFT ELBOW PAIN: Status: RESOLVED | Noted: 2018-08-10 | Resolved: 2019-12-18

## 2019-12-18 PROBLEM — T78.40XA ALLERGY: Status: RESOLVED | Noted: 2018-01-26 | Resolved: 2019-12-18

## 2019-12-19 ENCOUNTER — OFFICE VISIT (OUTPATIENT)
Dept: INTERNAL MEDICINE | Facility: OTHER | Age: 64
End: 2019-12-19
Attending: INTERNAL MEDICINE
Payer: MEDICARE

## 2019-12-19 VITALS
DIASTOLIC BLOOD PRESSURE: 70 MMHG | RESPIRATION RATE: 16 BRPM | WEIGHT: 163 LBS | HEART RATE: 56 BPM | SYSTOLIC BLOOD PRESSURE: 132 MMHG | BODY MASS INDEX: 26.2 KG/M2 | TEMPERATURE: 98.4 F | HEIGHT: 66 IN

## 2019-12-19 DIAGNOSIS — I10 BENIGN ESSENTIAL HYPERTENSION: Primary | ICD-10-CM

## 2019-12-19 DIAGNOSIS — M81.0 AGE-RELATED OSTEOPOROSIS WITHOUT CURRENT PATHOLOGICAL FRACTURE: ICD-10-CM

## 2019-12-19 DIAGNOSIS — I89.0 LYMPHEDEMA OF BOTH LOWER EXTREMITIES: ICD-10-CM

## 2019-12-19 DIAGNOSIS — R73.09 ELEVATED GLUCOSE: ICD-10-CM

## 2019-12-19 DIAGNOSIS — E78.2 MIXED HYPERLIPIDEMIA: ICD-10-CM

## 2019-12-19 LAB
ALBUMIN SERPL-MCNC: 4.5 G/DL (ref 3.5–5.7)
ALP SERPL-CCNC: 50 U/L (ref 34–104)
ALT SERPL W P-5'-P-CCNC: 12 U/L (ref 7–52)
ANION GAP SERPL CALCULATED.3IONS-SCNC: 6 MMOL/L (ref 3–14)
AST SERPL W P-5'-P-CCNC: 16 U/L (ref 13–39)
BASOPHILS # BLD AUTO: 0 10E9/L (ref 0–0.2)
BASOPHILS NFR BLD AUTO: 0.6 %
BILIRUB SERPL-MCNC: 0.5 MG/DL (ref 0.3–1)
BUN SERPL-MCNC: 16 MG/DL (ref 7–25)
CALCIUM SERPL-MCNC: 9.5 MG/DL (ref 8.6–10.3)
CHLORIDE SERPL-SCNC: 102 MMOL/L (ref 98–107)
CHOLEST SERPL-MCNC: 278 MG/DL
CO2 SERPL-SCNC: 31 MMOL/L (ref 21–31)
CREAT SERPL-MCNC: 0.78 MG/DL (ref 0.6–1.2)
DIFFERENTIAL METHOD BLD: NORMAL
EOSINOPHIL # BLD AUTO: 0.1 10E9/L (ref 0–0.7)
EOSINOPHIL NFR BLD AUTO: 1.3 %
ERYTHROCYTE [DISTWIDTH] IN BLOOD BY AUTOMATED COUNT: 13.2 % (ref 10–15)
GFR SERPL CREATININE-BSD FRML MDRD: 75 ML/MIN/{1.73_M2}
GLUCOSE SERPL-MCNC: 94 MG/DL (ref 70–105)
HBA1C MFR BLD: 5.6 % (ref 4–6)
HCT VFR BLD AUTO: 43.5 % (ref 35–47)
HDLC SERPL-MCNC: 59 MG/DL (ref 23–92)
HGB BLD-MCNC: 13.9 G/DL (ref 11.7–15.7)
IMM GRANULOCYTES # BLD: 0 10E9/L (ref 0–0.4)
IMM GRANULOCYTES NFR BLD: 0.3 %
LDLC SERPL CALC-MCNC: 192 MG/DL
LYMPHOCYTES # BLD AUTO: 2.4 10E9/L (ref 0.8–5.3)
LYMPHOCYTES NFR BLD AUTO: 37.4 %
MCH RBC QN AUTO: 28.7 PG (ref 26.5–33)
MCHC RBC AUTO-ENTMCNC: 32 G/DL (ref 31.5–36.5)
MCV RBC AUTO: 90 FL (ref 78–100)
MONOCYTES # BLD AUTO: 0.4 10E9/L (ref 0–1.3)
MONOCYTES NFR BLD AUTO: 6.6 %
NEUTROPHILS # BLD AUTO: 3.4 10E9/L (ref 1.6–8.3)
NEUTROPHILS NFR BLD AUTO: 53.8 %
NONHDLC SERPL-MCNC: 219 MG/DL
PLATELET # BLD AUTO: 288 10E9/L (ref 150–450)
POTASSIUM SERPL-SCNC: 3.9 MMOL/L (ref 3.5–5.1)
PROT SERPL-MCNC: 7.2 G/DL (ref 6.4–8.9)
RBC # BLD AUTO: 4.85 10E12/L (ref 3.8–5.2)
SODIUM SERPL-SCNC: 139 MMOL/L (ref 134–144)
TRIGL SERPL-MCNC: 137 MG/DL
WBC # BLD AUTO: 6.4 10E9/L (ref 4–11)

## 2019-12-19 PROCEDURE — G0463 HOSPITAL OUTPT CLINIC VISIT: HCPCS

## 2019-12-19 PROCEDURE — 99214 OFFICE O/P EST MOD 30 MIN: CPT | Performed by: INTERNAL MEDICINE

## 2019-12-19 PROCEDURE — 36415 COLL VENOUS BLD VENIPUNCTURE: CPT | Mod: ZL | Performed by: INTERNAL MEDICINE

## 2019-12-19 PROCEDURE — 80053 COMPREHEN METABOLIC PANEL: CPT | Mod: ZL | Performed by: INTERNAL MEDICINE

## 2019-12-19 PROCEDURE — 83036 HEMOGLOBIN GLYCOSYLATED A1C: CPT | Mod: ZL | Performed by: INTERNAL MEDICINE

## 2019-12-19 PROCEDURE — 85025 COMPLETE CBC W/AUTO DIFF WBC: CPT | Mod: ZL | Performed by: INTERNAL MEDICINE

## 2019-12-19 PROCEDURE — 80061 LIPID PANEL: CPT | Mod: ZL | Performed by: INTERNAL MEDICINE

## 2019-12-19 RX ORDER — LISINOPRIL/HYDROCHLOROTHIAZIDE 10-12.5 MG
1 TABLET ORAL DAILY
Qty: 90 TABLET | Refills: 3 | Status: SHIPPED | OUTPATIENT
Start: 2019-12-19 | End: 2020-12-15 | Stop reason: ALTCHOICE

## 2019-12-19 ASSESSMENT — ENCOUNTER SYMPTOMS
CHILLS: 0
BACK PAIN: 0
BRUISES/BLEEDS EASILY: 0
WOUND: 0
CONFUSION: 0
CHEST TIGHTNESS: 0
ABDOMINAL PAIN: 0
ADENOPATHY: 0
FEVER: 0
SHORTNESS OF BREATH: 0
HEMATURIA: 0

## 2019-12-19 ASSESSMENT — ANXIETY QUESTIONNAIRES
6. BECOMING EASILY ANNOYED OR IRRITABLE: NOT AT ALL
2. NOT BEING ABLE TO STOP OR CONTROL WORRYING: NOT AT ALL
GAD7 TOTAL SCORE: 0
3. WORRYING TOO MUCH ABOUT DIFFERENT THINGS: NOT AT ALL
5. BEING SO RESTLESS THAT IT IS HARD TO SIT STILL: NOT AT ALL
1. FEELING NERVOUS, ANXIOUS, OR ON EDGE: NOT AT ALL
7. FEELING AFRAID AS IF SOMETHING AWFUL MIGHT HAPPEN: NOT AT ALL
IF YOU CHECKED OFF ANY PROBLEMS ON THIS QUESTIONNAIRE, HOW DIFFICULT HAVE THESE PROBLEMS MADE IT FOR YOU TO DO YOUR WORK, TAKE CARE OF THINGS AT HOME, OR GET ALONG WITH OTHER PEOPLE: NOT DIFFICULT AT ALL

## 2019-12-19 ASSESSMENT — PAIN SCALES - GENERAL: PAINLEVEL: NO PAIN (0)

## 2019-12-19 ASSESSMENT — MIFFLIN-ST. JEOR: SCORE: 1303.17

## 2019-12-19 ASSESSMENT — PATIENT HEALTH QUESTIONNAIRE - PHQ9
SUM OF ALL RESPONSES TO PHQ QUESTIONS 1-9: 0
5. POOR APPETITE OR OVEREATING: NOT AT ALL

## 2019-12-19 NOTE — PROGRESS NOTES
"Nursing Notes:   Sangeetha Schaeffer LPN  12/19/2019 10:30 AM  Signed  Patient presents to the clinic for medication management, refill and labs pending.    Chief Complaint   Patient presents with     Recheck Medication       Initial BP (!) 142/70 (BP Location: Right arm, Patient Position: Sitting, Cuff Size: Adult Regular)   Pulse 56   Temp 98.4  F (36.9  C) (Tympanic)   Resp 16   Ht 1.664 m (5' 5.5\")   Wt 73.9 kg (163 lb)   BMI 26.71 kg/m    Estimated body mass index is 26.71 kg/m  as calculated from the following:    Height as of this encounter: 1.664 m (5' 5.5\").    Weight as of this encounter: 73.9 kg (163 lb).  Medication Reconciliation: complete    Sangeetha Schaeffer LPN    Nursing note reviewed with patient.  Accuracy and completeness verified.   Ms. Del Rosario is a 64 year old female who:  Patient presents with:  Recheck Medication      ICD-10-CM    1. Benign essential hypertension I10 lisinopril-hydrochlorothiazide (PRINZIDE/ZESTORETIC) 10-12.5 MG tablet     CBC and Differential     Comprehensive Metabolic Panel   2. Mixed hyperlipidemia E78.2 Lipid Panel   3. Lymphedema of both lower extremities I89.0 order for DME   4. Age-related osteoporosis without current pathological fracture M81.0    5. Elevated glucose R73.09 Hemoglobin A1c     HPI  Patient presents for annual follow-up.  Overall doing well.  Saw endocrinology in [place] recently and states her osteoporosis is stable.  Had DEXA scan updated recently as well.  Osteoporosis - seeing endocrinology in [place].  DEXA - stable for past 2 year. Stopped calcium and vitamin D supplementation.     Hypertension, blood pressures were being checked at home but she has not been checking them recently.  Has been controlled.  Needs lab work.  Advised increasing dose up to 1 full tablet daily.  She was just taking half of a tablet recently.  Has chronic lymphedema due to radiation therapy for cervical cancer.  Needs updated prescription for compression stockings.  She " wears thigh-high.    Elevated random glucose, check hemoglobin A1c.  She is worried about diabetes.    Functional Capacity: > 4 METS.   Review of Systems   Constitutional: Negative for chills and fever.   HENT: Positive for hearing loss (  -- low tones). Negative for congestion.    Eyes: Negative for visual disturbance.   Respiratory: Negative for chest tightness and shortness of breath.    Cardiovascular: Positive for leg swelling (+ lymphedema of legs). Negative for chest pain.   Gastrointestinal: Negative for abdominal pain.   Endocrine:        + osteoporosis   Genitourinary: Negative for hematuria.        Hx urterine cancer s/p radiation --> lymphedema   Musculoskeletal: Negative for back pain.   Skin: Negative for rash and wound.   Neurological: Negative for syncope.   Hematological: Negative for adenopathy. Does not bruise/bleed easily.   Psychiatric/Behavioral: Negative for confusion.        Problem List/PMH: reviewed in EMR, and made relevant updates today.  Medications: reviewed in EMR, and made relevant updates today.  Allergies: reviewed in EMR, and made relevant updates today.  I reviewed family and social history and made relevant updates today.  Social History     Tobacco Use     Smoking status: Never Smoker     Smokeless tobacco: Never Used   Substance Use Topics     Alcohol use: No     Alcohol/week: 0.0 standard drinks     Drug use: Yes      Family History   Problem Relation Age of Onset     Heart Disease Father         Heart Disease, at 57     Diabetes Mother         Diabetes,type II DM     Other - See Comments Mother Provider         at 89     Diabetes Sister         Diabetes,Type II DM -  at about 73     Thyroid Disease Sister         Thyroid Disease     Heart Disease Brother         Heart Disease, at 56       EXAM:   Vitals:    19 1017   BP: 132/70   BP Location: Right arm   Patient Position: Sitting   Cuff Size: Adult Regular   Pulse: 56   Resp: 16   Temp: 98.4  F (36.9  C)  "  TempSrc: Tympanic   Weight: 73.9 kg (163 lb)   Height: 1.664 m (5' 5.5\")       Current Pain Score: No Pain (0)     BP Readings from Last 3 Encounters:   12/19/19 132/70   12/17/18 132/84   08/23/18 132/82      Wt Readings from Last 3 Encounters:   12/19/19 73.9 kg (163 lb)   12/17/18 76.2 kg (168 lb)   08/10/18 76.4 kg (168 lb 8 oz)      Estimated body mass index is 26.71 kg/m  as calculated from the following:    Height as of this encounter: 1.664 m (5' 5.5\").    Weight as of this encounter: 73.9 kg (163 lb).     Physical Exam  Constitutional:       General: She is not in acute distress.     Appearance: She is well-developed. She is not diaphoretic.   HENT:      Head: Normocephalic and atraumatic.   Eyes:      General: No scleral icterus.     Conjunctiva/sclera: Conjunctivae normal.   Neck:      Musculoskeletal: Neck supple.   Cardiovascular:      Rate and Rhythm: Normal rate and regular rhythm.   Pulmonary:      Effort: Pulmonary effort is normal.      Breath sounds: Normal breath sounds.   Abdominal:      Palpations: Abdomen is soft.      Tenderness: There is no abdominal tenderness.   Musculoskeletal:         General: No deformity.      Comments: + lymphedema bilateral legs   Lymphadenopathy:      Cervical: No cervical adenopathy.   Skin:     General: Skin is warm and dry.      Findings: No rash.   Neurological:      General: No focal deficit present.      Mental Status: She is alert.   Psychiatric:         Mood and Affect: Mood normal.         Behavior: Behavior normal.          Procedures   INVESTIGATIONS:  No results found for any visits on 12/19/19.    ASSESSMENT AND PLAN:  Problem List Items Addressed This Visit        Endocrine    Mixed hyperlipidemia    Relevant Orders    Lipid Panel       Circulatory    Benign essential hypertension - Primary    Relevant Medications    lisinopril-hydrochlorothiazide (PRINZIDE/ZESTORETIC) 10-12.5 MG tablet    Other Relevant Orders    CBC and Differential    " Comprehensive Metabolic Panel       Musculoskeletal and Integumentary    Age-related osteoporosis without current pathological fracture    Lymphedema of both lower extremities    Relevant Medications    order for DME      Other Visit Diagnoses     Elevated glucose        Relevant Orders    Hemoglobin A1c        reviewed diet, exercise and weight control, recommended sodium restriction, cardiovascular risk and specific lipid/LDL goals reviewed  -- Expected clinical course discussed    -- Medications and their side effects discussed    Patient Instructions   + Cologuard stool blood testing ordered -- mail in sample once you get the kit in the mail.     Keep reducing your sodium/salt intake.     Medications refilled.   Increase your lisinopril/HCTZ to 1 tablet daily.     Labs today.     Return in approximately 1 year, or sooner as needed for follow-up with Dr. Fitch.  - Annual Follow-up / Physical    Clinic : [phone]  Appointment line: [phone]     Chris Fitch MD  Internal Medicine  [facility]     Portions of this note were dictated using speech recognition software. The note has been proofread but errors in the text may have been overlooked. Please contact me if there are any concerns regarding the accuracy of the dictation.

## 2019-12-19 NOTE — PATIENT INSTRUCTIONS
+ Cologuard stool blood testing ordered -- mail in sample once you get the kit in the mail.     Keep reducing your sodium/salt intake.     Medications refilled.   Increase your lisinopril/HCTZ to 1 tablet daily.     Labs today.     Return in approximately 1 year, or sooner as needed for follow-up with Dr. Fitch.  - Annual Follow-up / Physical    Clinic : [phone]  Appointment line: [phone]

## 2019-12-19 NOTE — NURSING NOTE
"Patient presents to the clinic for medication management, refill and labs pending.    Chief Complaint   Patient presents with     Recheck Medication       Initial BP (!) 142/70 (BP Location: Right arm, Patient Position: Sitting, Cuff Size: Adult Regular)   Pulse 56   Temp 98.4  F (36.9  C) (Tympanic)   Resp 16   Ht 1.664 m (5' 5.5\")   Wt 73.9 kg (163 lb)   BMI 26.71 kg/m   Estimated body mass index is 26.71 kg/m  as calculated from the following:    Height as of this encounter: 1.664 m (5' 5.5\").    Weight as of this encounter: 73.9 kg (163 lb).  Medication Reconciliation: complete    Sangeetha Schaeffer LPN    "

## 2019-12-19 NOTE — LETTER
December 20, 2019      Eloisa Del Rosario  [address]   [facility] [place] [address]        Dear ,    We are writing to inform you of your test results.    Cholesterol levels are high.  Labs otherwise look good.    Resulted Orders   Hemoglobin A1c   Result Value Ref Range    Hemoglobin A1C 5.6 4.0 - 6.0 %   Lipid Panel   Result Value Ref Range    Cholesterol 278 (H) <200 mg/dL    Triglycerides 137 <150 mg/dL    HDL Cholesterol 59 23 - 92 mg/dL    LDL Cholesterol Calculated 192 (H) <100 mg/dL      Comment:      Above desirable:  100-129 mg/dl  Borderline High:  130-159 mg/dL  High:             160-189 mg/dL  Very high:       >189 mg/dl      Non HDL Cholesterol 219 (H) <130 mg/dL      Comment:      Above Desirable:  130-159 mg/dl  Borderline high:  160-189 mg/dl  High:             190-219 mg/dl  Very high:       >219 mg/dl     Comprehensive Metabolic Panel   Result Value Ref Range    Sodium 139 134 - 144 mmol/L    Potassium 3.9 3.5 - 5.1 mmol/L    Chloride 102 98 - 107 mmol/L    Carbon Dioxide 31 21 - 31 mmol/L    Anion Gap 6 3 - 14 mmol/L    Glucose 94 70 - 105 mg/dL    Urea Nitrogen 16 7 - 25 mg/dL    Creatinine 0.78 0.60 - 1.20 mg/dL    GFR Estimate 75 >60 mL/min/[1.73_m2]    GFR Estimate If Black >90 >60 mL/min/[1.73_m2]    Calcium 9.5 8.6 - 10.3 mg/dL    Bilirubin Total 0.5 0.3 - 1.0 mg/dL    Albumin 4.5 3.5 - 5.7 g/dL    Protein Total 7.2 6.4 - 8.9 g/dL    Alkaline Phosphatase 50 34 - 104 U/L    ALT 12 7 - 52 U/L    AST 16 13 - 39 U/L   CBC and Differential   Result Value Ref Range    WBC 6.4 4.0 - 11.0 10e9/L    RBC Count 4.85 3.8 - 5.2 10e12/L    Hemoglobin 13.9 11.7 - 15.7 g/dL    Hematocrit 43.5 35.0 - 47.0 %    MCV 90 78 - 100 fl    MCH 28.7 26.5 - 33.0 pg    MCHC 32.0 31.5 - 36.5 g/dL    RDW 13.2 10.0 - 15.0 %    Platelet Count 288 150 - 450 10e9/L    Diff Method Automated Method     % Neutrophils 53.8 %    % Lymphocytes 37.4 %    % Monocytes 6.6 %    % Eosinophils 1.3 %    % Basophils  0.6 %    % Immature Granulocytes 0.3 %    Absolute Neutrophil 3.4 1.6 - 8.3 10e9/L    Absolute Lymphocytes 2.4 0.8 - 5.3 10e9/L    Absolute Monocytes 0.4 0.0 - 1.3 10e9/L    Absolute Eosinophils 0.1 0.0 - 0.7 10e9/L    Absolute Basophils 0.0 0.0 - 0.2 10e9/L    Abs Immature Granulocytes 0.0 0 - 0.4 10e9/L       If you have any questions or concerns, please call the clinic at the number listed above.       Sincerely,        Chris Fitch MD

## 2019-12-20 ASSESSMENT — ANXIETY QUESTIONNAIRES: GAD7 TOTAL SCORE: 0

## 2019-12-22 ENCOUNTER — MYC MEDICAL ADVICE (OUTPATIENT)
Dept: INTERNAL MEDICINE | Facility: OTHER | Age: 64
End: 2019-12-22

## 2019-12-24 NOTE — TELEPHONE ENCOUNTER
Diet and exercise can help some.... but will need to take a cholesterol medication to really get it down.     Chris Fitch MD

## 2020-01-09 LAB — COLOGUARD-ABSTRACT: NEGATIVE

## 2020-01-29 ENCOUNTER — MYC MEDICAL ADVICE (OUTPATIENT)
Dept: INTERNAL MEDICINE | Facility: OTHER | Age: 65
End: 2020-01-29

## 2020-01-29 ENCOUNTER — TELEPHONE (OUTPATIENT)
Dept: INTERNAL MEDICINE | Facility: OTHER | Age: 65
End: 2020-01-29

## 2020-01-29 NOTE — TELEPHONE ENCOUNTER
Patient called and is inquiring about test results she would like a call back thank you .   Patsy Krause on 1/29/2020 at 4:15 PM

## 2020-01-29 NOTE — TELEPHONE ENCOUNTER
Cologuard results were scanned in but patient was never notified.  Patient would like a Lettuce message with the test results.      Sangeetha Schaeffer LPN 1/29/2020 4:53 PM

## 2020-02-08 ENCOUNTER — HEALTH MAINTENANCE LETTER (OUTPATIENT)
Age: 65
End: 2020-02-08

## 2020-03-02 DIAGNOSIS — I10 BENIGN ESSENTIAL HYPERTENSION: ICD-10-CM

## 2020-03-05 RX ORDER — LISINOPRIL/HYDROCHLOROTHIAZIDE 10-12.5 MG
TABLET ORAL
Qty: 90 TABLET | Refills: 3 | OUTPATIENT
Start: 2020-03-05

## 2020-03-06 ENCOUNTER — TELEPHONE (OUTPATIENT)
Dept: INTERNAL MEDICINE | Facility: OTHER | Age: 65
End: 2020-03-06

## 2020-03-06 NOTE — TELEPHONE ENCOUNTER
Patient states that [facility] is trying to get a hold of [facility] to refill her Lisinopril and have not got a response. If you could please call her in rearguards to this thank you.   Patsy Krause on 3/6/2020 at 9:48 AM

## 2020-03-06 NOTE — TELEPHONE ENCOUNTER
Called and spoke with Sheldon at [facility] and he states that they have refills on file.    Called patient and informed her that refills are at [facility] and they are getting ready for her.    Karla De Luna RN on 3/6/2020 at 11:14 AM

## 2020-06-23 ENCOUNTER — TRANSFERRED RECORDS (OUTPATIENT)
Dept: HEALTH INFORMATION MANAGEMENT | Facility: OTHER | Age: 65
End: 2020-06-23

## 2020-11-07 ENCOUNTER — HEALTH MAINTENANCE LETTER (OUTPATIENT)
Age: 65
End: 2020-11-07

## 2020-12-15 ENCOUNTER — OFFICE VISIT (OUTPATIENT)
Dept: INTERNAL MEDICINE | Facility: OTHER | Age: 65
End: 2020-12-15
Attending: INTERNAL MEDICINE
Payer: COMMERCIAL

## 2020-12-15 VITALS
HEART RATE: 66 BPM | DIASTOLIC BLOOD PRESSURE: 100 MMHG | HEIGHT: 66 IN | WEIGHT: 162.8 LBS | BODY MASS INDEX: 26.16 KG/M2 | SYSTOLIC BLOOD PRESSURE: 180 MMHG | OXYGEN SATURATION: 98 % | RESPIRATION RATE: 18 BRPM | TEMPERATURE: 99.1 F

## 2020-12-15 DIAGNOSIS — E78.49 FAMILIAL HYPERLIPIDEMIA: ICD-10-CM

## 2020-12-15 DIAGNOSIS — R60.0 BILATERAL LOWER EXTREMITY EDEMA: ICD-10-CM

## 2020-12-15 DIAGNOSIS — R73.9 ELEVATED RANDOM BLOOD GLUCOSE LEVEL: ICD-10-CM

## 2020-12-15 DIAGNOSIS — Z12.31 VISIT FOR SCREENING MAMMOGRAM: ICD-10-CM

## 2020-12-15 DIAGNOSIS — I10 UNCONTROLLED HYPERTENSION: Primary | ICD-10-CM

## 2020-12-15 DIAGNOSIS — I89.0 LYMPHEDEMA OF BOTH LOWER EXTREMITIES: ICD-10-CM

## 2020-12-15 DIAGNOSIS — B02.29 NEURALGIA, POST-HERPETIC: ICD-10-CM

## 2020-12-15 DIAGNOSIS — M81.0 AGE-RELATED OSTEOPOROSIS WITHOUT CURRENT PATHOLOGICAL FRACTURE: ICD-10-CM

## 2020-12-15 DIAGNOSIS — I10 BENIGN ESSENTIAL HYPERTENSION: ICD-10-CM

## 2020-12-15 DIAGNOSIS — Z00.00 ENCOUNTER FOR MEDICARE ANNUAL WELLNESS EXAM: ICD-10-CM

## 2020-12-15 LAB
ALBUMIN SERPL-MCNC: 4.2 G/DL (ref 3.5–5.7)
ALP SERPL-CCNC: 54 U/L (ref 34–104)
ALT SERPL W P-5'-P-CCNC: 12 U/L (ref 7–52)
ANION GAP SERPL CALCULATED.3IONS-SCNC: 8 MMOL/L (ref 3–14)
AST SERPL W P-5'-P-CCNC: 16 U/L (ref 13–39)
BILIRUB SERPL-MCNC: 0.3 MG/DL (ref 0.3–1)
BUN SERPL-MCNC: 16 MG/DL (ref 7–25)
CALCIUM SERPL-MCNC: 9.6 MG/DL (ref 8.6–10.3)
CHLORIDE SERPL-SCNC: 102 MMOL/L (ref 98–107)
CHOLEST SERPL-MCNC: 275 MG/DL
CO2 SERPL-SCNC: 29 MMOL/L (ref 21–31)
CREAT SERPL-MCNC: 0.87 MG/DL (ref 0.6–1.2)
ERYTHROCYTE [DISTWIDTH] IN BLOOD BY AUTOMATED COUNT: 13.2 % (ref 10–15)
GFR SERPL CREATININE-BSD FRML MDRD: 66 ML/MIN/{1.73_M2}
GLUCOSE SERPL-MCNC: 97 MG/DL (ref 70–105)
HBA1C MFR BLD: 5.3 % (ref 4–6)
HCT VFR BLD AUTO: 42.4 % (ref 35–47)
HDLC SERPL-MCNC: 49 MG/DL (ref 23–92)
HGB BLD-MCNC: 13.7 G/DL (ref 11.7–15.7)
LDLC SERPL CALC-MCNC: 190 MG/DL
MAGNESIUM SERPL-MCNC: 1.9 MG/DL (ref 1.9–2.7)
MCH RBC QN AUTO: 28 PG (ref 26.5–33)
MCHC RBC AUTO-ENTMCNC: 32.3 G/DL (ref 31.5–36.5)
MCV RBC AUTO: 87 FL (ref 78–100)
NONHDLC SERPL-MCNC: 226 MG/DL
PHOSPHATE SERPL-MCNC: 3.5 MG/DL (ref 2.5–5)
PLATELET # BLD AUTO: 276 10E9/L (ref 150–450)
POTASSIUM SERPL-SCNC: 4.3 MMOL/L (ref 3.5–5.1)
PROT SERPL-MCNC: 6.7 G/DL (ref 6.4–8.9)
RBC # BLD AUTO: 4.9 10E12/L (ref 3.8–5.2)
SODIUM SERPL-SCNC: 139 MMOL/L (ref 134–144)
TRIGL SERPL-MCNC: 180 MG/DL
TSH SERPL DL<=0.05 MIU/L-ACNC: 3.61 IU/ML (ref 0.34–5.6)
WBC # BLD AUTO: 7.6 10E9/L (ref 4–11)

## 2020-12-15 PROCEDURE — 80053 COMPREHEN METABOLIC PANEL: CPT | Mod: ZL | Performed by: INTERNAL MEDICINE

## 2020-12-15 PROCEDURE — 80061 LIPID PANEL: CPT | Mod: ZL | Performed by: INTERNAL MEDICINE

## 2020-12-15 PROCEDURE — 36415 COLL VENOUS BLD VENIPUNCTURE: CPT | Mod: ZL | Performed by: INTERNAL MEDICINE

## 2020-12-15 PROCEDURE — 83735 ASSAY OF MAGNESIUM: CPT | Mod: ZL | Performed by: INTERNAL MEDICINE

## 2020-12-15 PROCEDURE — G0463 HOSPITAL OUTPT CLINIC VISIT: HCPCS

## 2020-12-15 PROCEDURE — G0438 PPPS, INITIAL VISIT: HCPCS | Performed by: INTERNAL MEDICINE

## 2020-12-15 PROCEDURE — 85027 COMPLETE CBC AUTOMATED: CPT | Mod: ZL | Performed by: INTERNAL MEDICINE

## 2020-12-15 PROCEDURE — 84443 ASSAY THYROID STIM HORMONE: CPT | Mod: ZL | Performed by: INTERNAL MEDICINE

## 2020-12-15 PROCEDURE — 83036 HEMOGLOBIN GLYCOSYLATED A1C: CPT | Mod: ZL | Performed by: INTERNAL MEDICINE

## 2020-12-15 PROCEDURE — 99214 OFFICE O/P EST MOD 30 MIN: CPT | Mod: 25 | Performed by: INTERNAL MEDICINE

## 2020-12-15 PROCEDURE — 84100 ASSAY OF PHOSPHORUS: CPT | Mod: ZL | Performed by: INTERNAL MEDICINE

## 2020-12-15 RX ORDER — TORSEMIDE 20 MG/1
20 TABLET ORAL DAILY
Qty: 90 TABLET | Refills: 3 | Status: SHIPPED | OUTPATIENT
Start: 2020-12-15 | End: 2020-12-29

## 2020-12-15 RX ORDER — LOVASTATIN 20 MG
20 TABLET ORAL DAILY
Qty: 90 TABLET | Refills: 3 | Status: SHIPPED | OUTPATIENT
Start: 2020-12-15 | End: 2021-05-20

## 2020-12-15 RX ORDER — LISINOPRIL 20 MG/1
20 TABLET ORAL DAILY
Qty: 90 TABLET | Refills: 3 | Status: SHIPPED | OUTPATIENT
Start: 2020-12-15 | End: 2021-05-20

## 2020-12-15 RX ORDER — LISINOPRIL 10 MG/1
TABLET ORAL
COMMUNITY
Start: 2020-11-18 | End: 2020-12-15

## 2020-12-15 ASSESSMENT — ENCOUNTER SYMPTOMS
ABDOMINAL PAIN: 0
BRUISES/BLEEDS EASILY: 0
WOUND: 0
SINUS PRESSURE: 1
HEMATURIA: 0
CONFUSION: 0
SHORTNESS OF BREATH: 0
HEADACHES: 1
ADENOPATHY: 0
CHILLS: 0
FEVER: 0
BACK PAIN: 0
CHEST TIGHTNESS: 0

## 2020-12-15 ASSESSMENT — PAIN SCALES - GENERAL: PAINLEVEL: NO PAIN (0)

## 2020-12-15 ASSESSMENT — MIFFLIN-ST. JEOR: SCORE: 1297.27

## 2020-12-15 NOTE — PROGRESS NOTES
"  SUBJECTIVE:   Eloisa Del Rosario is a 65 year old female who presents for Preventive Visit.  Patient has been advised of split billing requirements and indicates understanding: Yes  Are you in the first 12 months of your Medicare Part B coverage?  No    Physical Health:    In general, how would you rate your overall physical health? fair    Outside of work, how many days during the week do you exercise? none    Outside of work, approximately how many minutes a day do you exercise?not applicable    If you drink alcohol do you typically have >3 drinks per day or >7 drinks per week? No    Do you usually eat at least 4 servings of fruit and vegetables a day, include whole grains & fiber and avoid regularly eating high fat or \"junk\" foods? NO    Do you have any problems taking medications regularly?  No    Do you have any side effects from medications? none    Needs assistance for the following daily activities: no assistance needed    Which of the following safety concerns are present in your home?  none identified     Hearing impairment: No    In the past 6 months, have you been bothered by leaking of urine? no    Mental Health:    In general, how would you rate your overall mental or emotional health? fair  PHQ-2 Score:      Do you feel safe in your environment? Yes    Have you ever done Advance Care Planning? (For example, a Health Directive, POLST, or a discussion with a medical provider or your loved ones about your wishes): No, advance care planning information given to patient to review.  Patient plans to discuss their wishes with loved ones or provider.      Additional concerns to address?  No    Fall risk:  Fallen 2 or more times in the past year?: No  Any fall with injury in the past year?: No    Cognitive Screenin) Repeat 3 items (Leader, Season, Table)    2) Clock draw: NORMAL  3) 3 item recall: Recalls 2 objects   Results: NORMAL clock, 1-2 items recalled: COGNITIVE IMPAIRMENT LESS " LIKELY    Mini-CogTM Copyright S Julissa. Licensed by the author for use in [facility]; reprinted with permission (marcos@[facility]). All rights reserved.      Do you have sleep apnea, excessive snoring or daytime drowsiness?: no    Reviewed and updated as needed this visit by clinical staff  Tobacco  Allergies  Meds  Problems  Med Hx  Surg Hx  Fam Hx  Soc Hx          Reviewed and updated as needed this visit by Provider  Tobacco  Allergies  Meds  Problems  Med Hx  Surg Hx  Fam Hx         Social History     Tobacco Use     Smoking status: Never Smoker     Smokeless tobacco: Never Used   Substance Use Topics     Alcohol use: No     Alcohol/week: 0.0 standard drinks                           Current providers sharing in care for this patient include:   Patient Care Team:  Chris Fitch MD as PCP - General (Internal Medicine)  Chris Fitch MD as Assigned PCP  Chris Fitch MD (Internal Medicine)    The following health maintenance items are reviewed in Epic and correct as of today:  Health Maintenance   Topic Date Due     ZOSTER IMMUNIZATION (1 of 2) 09/06/2006     DTAP/TDAP/TD IMMUNIZATION (2 - Td) 02/19/2019     MAMMO SCREENING  07/19/2020     PAP  08/10/2021     MEDICARE ANNUAL WELLNESS VISIT  12/15/2021     LIPID  12/15/2025     ADVANCE CARE PLANNING  12/15/2025     COLORECTAL CANCER SCREENING  01/09/2030     PHQ-2  Completed     HEPATITIS C SCREENING  Addressed     HIV SCREENING  Addressed     Pneumococcal Vaccine: Pediatrics (0 to 5 Years) and At-Risk Patients (6 to 64 Years)  Aged Out     IPV IMMUNIZATION  Aged Out     MENINGITIS IMMUNIZATION  Aged Out     HEPATITIS B IMMUNIZATION  Aged Out     INFLUENZA VACCINE  Discontinued     Review of Systems   Constitutional: Negative for chills and fever.   HENT: Positive for congestion, hearing loss (-- low tones) and sinus pressure.    Eyes: Negative for visual disturbance.   Respiratory: Negative for chest tightness and shortness of breath.  "   Cardiovascular: Positive for leg swelling (+ lymphedema of legs + pitting edema). Negative for chest pain.   Gastrointestinal: Negative for abdominal pain.   Endocrine:        + osteoporosis   Genitourinary: Negative for hematuria.        Hx urterine cancer s/p radiation --> lymphedema   Musculoskeletal: Negative for back pain.   Skin: Negative for rash and wound.   Neurological: Positive for headaches. Negative for syncope.        Post-herpetic neuralgia - left flank   Hematological: Negative for adenopathy. Does not bruise/bleed easily.   Psychiatric/Behavioral: Negative for confusion.        OBJECTIVE:   BP (!) 180/100 (BP Location: Right arm, Patient Position: Sitting, Cuff Size: Adult Regular)   Pulse 66   Temp 99.1  F (37.3  C) (Tympanic)   Resp 18   Ht 1.664 m (5' 5.5\")   Wt 73.8 kg (162 lb 12.8 oz)   SpO2 98%   BMI 26.68 kg/m   Estimated body mass index is 26.68 kg/m  as calculated from the following:    Height as of this encounter: 1.664 m (5' 5.5\").    Weight as of this encounter: 73.8 kg (162 lb 12.8 oz).    Physical Exam  Constitutional:       General: She is not in acute distress.     Appearance: She is well-developed. She is not diaphoretic.   HENT:      Head: Normocephalic and atraumatic.   Eyes:      General: No scleral icterus.     Conjunctiva/sclera: Conjunctivae normal.   Neck:      Musculoskeletal: Neck supple.      Vascular: No carotid bruit.   Cardiovascular:      Rate and Rhythm: Normal rate and regular rhythm.      Pulses: Normal pulses.   Pulmonary:      Effort: Pulmonary effort is normal.      Breath sounds: Normal breath sounds.   Abdominal:      Palpations: Abdomen is soft.      Tenderness: There is no abdominal tenderness.   Musculoskeletal:         General: No deformity.      Right lower le+ Pitting Edema present.      Left lower le+ Pitting Edema present.      Comments: + lymphedema bilateral legs + pitting edema   Lymphadenopathy:      Cervical: No cervical " adenopathy.   Skin:     General: Skin is warm and dry.      Findings: No rash.   Neurological:      Mental Status: She is alert and oriented to person, place, and time. Mental status is at baseline.   Psychiatric:         Mood and Affect: Mood normal.         Behavior: Behavior normal.          Diagnostic Test Results:  Labs reviewed in Epic    ASSESSMENT / PLAN:       ICD-10-CM    1. Uncontrolled hypertension  I10    2. Benign essential hypertension  I10 torsemide (DEMADEX) 20 MG tablet     lisinopril (ZESTRIL) 20 MG tablet     CBC with platelets     Comprehensive metabolic panel     TSH Reflex GH     TSH Reflex GH     Comprehensive metabolic panel     CBC with platelets   3. Encounter for Medicare annual wellness exam  Z00.00    4. Bilateral lower extremity edema  R60.0 torsemide (DEMADEX) 20 MG tablet   5. Lymphedema of both lower extremities  I89.0    6. Familial hyperlipidemia  E78.49 lovastatin (MEVACOR) 20 MG tablet     Lipid Profile     Lipid Profile   7. Neuralgia, post-herpetic  B02.29    8. Age-related osteoporosis without current pathological fracture  M81.0 Phosphorus     Magnesium     Magnesium     Phosphorus   9. Elevated random blood glucose level  R73.09 Hemoglobin A1c     Hemoglobin A1c   10. Visit for screening mammogram  Z12.31 MA Screening Digital Bilateral   Patient presents for annual Medicare wellness visit as well as follow-up multiple issues.    Uncontrolled hypertension, blood pressures are quite high today.  She has not been taking her diuretic.  Just simply taking plain lisinopril at 10 mg daily.  We will increase this up to 20 mg daily.  Also start torsemide.  She has been having some significant lower extremity edema.  She also has lymphedema still we did advise that she will never get rid of all of the swelling in her legs.  Her left leg seems to have more lymphedema than her right.  She does have some pitting edema in both legs today however.  -Start torsemide.  Increase  "lisinopril.  -Check labs.  -She will require close clinic follow-up and repeat lab work in 3 to 4 weeks.    Bilateral lower extremity lymphedema.  Encouraged her to continue wearing compression stockings.  If needed we can have her see physical therapy to meet with the lymphedema clinic.    Familial hyperlipidemia, cluster levels are very elevated.  She is not currently taking any statins.  Start lovastatin 20 mg.  She is quite hesitant at first but is willing to try it once or twice a week to start with.  Advised if tolerating well we would increase this up to daily use.  Recheck labs today for baseline.    Postherpetic neuralgia, still having some pain in the left flank after having shingles.  This has been somewhat of a chronic ongoing issue.  She is not interested in any medication for this right now.    Age-related osteoporosis, check magnesium and phosphorus levels.  She does not take calcium or vitamin D supplementation.    Elevated random glucose, check hemoglobin A1c.  Look for diabetes today.    Mammogram is due, she is interested in proceeding with this, orders placed.    Patient has been advised of split billing requirements and indicates understanding: Yes    COUNSELING:  Reviewed preventive health counseling, as reflected in patient instructions  Special attention given to:       Regular exercise       Healthy diet/nutrition       Vision screening       Hearing screening       Dental care       Bladder control       Fall risk prevention       Immunizations    Estimated body mass index is 26.68 kg/m  as calculated from the following:    Height as of this encounter: 1.664 m (5' 5.5\").    Weight as of this encounter: 73.8 kg (162 lb 12.8 oz).        She reports that she has never smoked. She has never used smokeless tobacco.    Appropriate preventive services were discussed with this patient, including applicable screening as appropriate for cardiovascular disease, diabetes, osteopenia/osteoporosis, and " glaucoma.  As appropriate for age/gender, discussed screening for colorectal cancer, prostate cancer, breast cancer, and cervical cancer. Checklist reviewing preventive services available has been given to the patient.    Reviewed patients plan of care and provided an AVS. The Complex Care Plan (for patients with higher acuity and needing more deliberate coordination of services) for Eloisa meets the Care Plan requirement. This Care Plan has been established and reviewed with the Patient.    Counseling Resources:  ATP IV Guidelines  Pooled Cohorts Equation Calculator  Breast Cancer Risk Calculator  BRCA-Related Cancer Risk Assessment: FHS-7 Tool  FRAX Risk Assessment  ICSI Preventive Guidelines  Dietary Guidelines for Americans, 2010  USDA's MyPlate  ASA Prophylaxis  Lung CA Screening    Chris Fitch MD  [facility] AND [facility]

## 2020-12-15 NOTE — LETTER
December 17, 2020      Eloisa Del Rosario  [address]   [facility] [address]        Dear ,    We are writing to inform you of your test results.    Cholesterol levels are quite high.    Start lovastatin as discussed in clinic.  We can recheck cholesterol levels again in 2 to 3 months.    Labs are otherwise stable.  Continue current medications.    Chris Fitch MD     Resulted Orders   Magnesium   Result Value Ref Range    Magnesium 1.9 1.9 - 2.7 mg/dL   Phosphorus   Result Value Ref Range    Phosphorus 3.5 2.5 - 5.0 mg/dL   TSH Reflex GH   Result Value Ref Range    TSH Reflex 3.61 0.34 - 5.60 IU/mL   Comprehensive metabolic panel   Result Value Ref Range    Sodium 139 134 - 144 mmol/L    Potassium 4.3 3.5 - 5.1 mmol/L    Chloride 102 98 - 107 mmol/L    Carbon Dioxide 29 21 - 31 mmol/L    Anion Gap 8 3 - 14 mmol/L    Glucose 97 70 - 105 mg/dL    Urea Nitrogen 16 7 - 25 mg/dL    Creatinine 0.87 0.60 - 1.20 mg/dL    GFR Estimate 66 >60 mL/min/[1.73_m2]    GFR Estimate If Black 79 >60 mL/min/[1.73_m2]    Calcium 9.6 8.6 - 10.3 mg/dL    Bilirubin Total 0.3 0.3 - 1.0 mg/dL    Albumin 4.2 3.5 - 5.7 g/dL    Protein Total 6.7 6.4 - 8.9 g/dL    Alkaline Phosphatase 54 34 - 104 U/L    ALT 12 7 - 52 U/L    AST 16 13 - 39 U/L   CBC with platelets   Result Value Ref Range    WBC 7.6 4.0 - 11.0 10e9/L    RBC Count 4.90 3.8 - 5.2 10e12/L    Hemoglobin 13.7 11.7 - 15.7 g/dL    Hematocrit 42.4 35.0 - 47.0 %    MCV 87 78 - 100 fl    MCH 28.0 26.5 - 33.0 pg    MCHC 32.3 31.5 - 36.5 g/dL    RDW 13.2 10.0 - 15.0 %    Platelet Count 276 150 - 450 10e9/L   Lipid Profile   Result Value Ref Range    Cholesterol 275 (H) <200 mg/dL    Triglycerides 180 (H) <150 mg/dL      Comment:      Borderline high:  150-199 mg/dl  High:             200-499 mg/dl  Very high:       >499 mg/dl      HDL Cholesterol 49 23 - 92 mg/dL    LDL Cholesterol Calculated 190 (H) <100 mg/dL      Comment:      Above desirable:  100-129  mg/dl  Borderline High:  130-159 mg/dL  High:             160-189 mg/dL  Very high:       >189 mg/dl      Non HDL Cholesterol 226 (H) <130 mg/dL      Comment:      Above Desirable:  130-159 mg/dl  Borderline high:  160-189 mg/dl  High:             190-219 mg/dl  Very high:       >219 mg/dl     Hemoglobin A1c   Result Value Ref Range    Hemoglobin A1C 5.3 4.0 - 6.0 %       If you have any questions or concerns, please call the clinic at the number listed above.       Sincerely,      Chris Fitch MD

## 2020-12-15 NOTE — PATIENT INSTRUCTIONS
Patient Education   Personalized Prevention Plan  You are due for the preventive services outlined below.  Your care team is available to assist you in scheduling these services.  If you have already completed any of these items, please share that information with your care team to update in your medical record.  Health Maintenance Due   Topic Date Due     Zoster (Shingles) Vaccine (1 of 2) 09/06/2006     Diptheria Tetanus Pertussis (DTAP/TDAP/TD) Vaccine (2 - Td) 02/19/2019     PHQ-2  01/01/2020     Mammogram  07/19/2020        Patient Education   Personalized Prevention Plan  You are due for the preventive services outlined below.  Your care team is available to assist you in scheduling these services.  If you have already completed any of these items, please share that information with your care team to update in your medical record.  Health Maintenance Due   Topic Date Due     Zoster (Shingles) Vaccine (1 of 2) 09/06/2006     Diptheria Tetanus Pertussis (DTAP/TDAP/TD) Vaccine (2 - Td) 02/19/2019     PHQ-2  01/01/2020     Mammogram  07/19/2020           Immunization History   Administered Date(s) Administered     TDAP Vaccine (Boostrix) 02/19/2009        -- Consider Annual Flu / Influenza vaccination - Every Fall (Starting about October 1st)    -- Get your tetanus shot updated - from one of the local pharmacies, at your convenience. -- Anytime.     -- Get the new shingles shot (2 in series) (Shingrix) - from one of the local pharmacies, at your convenience. -- Check cost/coverage.     Pneumococcal Pneumonia vaccines (PCV 13 and PCV 23)  -- starting at age 65.       1. Uncontrolled hypertension  2. Benign essential hypertension  4. Bilateral lower extremity edema  START:   - torsemide (DEMADEX) 20 MG tablet; Take 1 tablet (20 mg) by mouth daily - for Blood pressure / edema  Dispense: 90 tablet; Refill: 3    Dose Increase:  - lisinopril (ZESTRIL) 20 MG tablet; Take 1 tablet (20 mg) by mouth daily -- Dose Increase  12/15/2020  Dispense: 90 tablet; Refill: 3    Labs today:  - CBC with platelets; Standing  - Comprehensive metabolic panel; Standing  - TSH Reflex GH; Standing    3. Encounter for Medicare annual wellness exam    5. Lymphedema of both lower extremities  - continue compression stockings.     6. Familial hyperlipidemia  START:   - lovastatin (MEVACOR) 20 MG tablet; Take 1 tablet (20 mg) by mouth daily - for cholesterol -- start twice weekly, increase dose if tolerated  Dispense: 90 tablet; Refill: 3  - Lipid Profile; Standing    7. Neuralgia, post-herpetic  -- consider topical lidocaine patches or gel.     8. Age-related osteoporosis without current pathological fracture  - Phosphorus; Future  - Magnesium; Future    9. Elevated random blood glucose level  - Hemoglobin A1c; Standing      Blood pressure checks at home - check some in AM, some in Afternoon, some in Evening and record   -- bring these with you to your next appointment.     Goal blood pressures -- less than 140 and less than 90.    -- Ideally would like the numbers about 110-130 and 70-80.  -- If running higher or lower than this on regular basis, will need to adjust your medications.        Return in approximately 2 week(s), or sooner as needed for follow-up with Dr. Fitch.  -- follow-up Hypertension / New blood pressure medications.     Clinic : [phone]  Appointment line: [phone]

## 2020-12-15 NOTE — NURSING NOTE
"Chief Complaint   Patient presents with     Physical      Patient presents to clinic for medicare annual physical.     Initial BP (!) 180/100 (BP Location: Right arm, Patient Position: Sitting, Cuff Size: Adult Regular)   Pulse 66   Temp 99.1  F (37.3  C) (Tympanic)   Resp 18   Ht 1.664 m (5' 5.5\")   Wt 73.8 kg (162 lb 12.8 oz)   SpO2 98%   BMI 26.68 kg/m   Estimated body mass index is 26.68 kg/m  as calculated from the following:    Height as of this encounter: 1.664 m (5' 5.5\").    Weight as of this encounter: 73.8 kg (162 lb 12.8 oz).         Medication Reconciliation: Complete      Kendra Schofield   "

## 2020-12-23 ENCOUNTER — HOSPITAL ENCOUNTER (OUTPATIENT)
Dept: MAMMOGRAPHY | Facility: OTHER | Age: 65
Discharge: HOME | End: 2020-12-23
Attending: INTERNAL MEDICINE | Admitting: INTERNAL MEDICINE
Payer: COMMERCIAL

## 2020-12-23 DIAGNOSIS — Z12.31 VISIT FOR SCREENING MAMMOGRAM: ICD-10-CM

## 2020-12-23 PROCEDURE — 77063 BREAST TOMOSYNTHESIS BI: CPT

## 2020-12-29 ENCOUNTER — OFFICE VISIT (OUTPATIENT)
Dept: INTERNAL MEDICINE | Facility: OTHER | Age: 65
End: 2020-12-29
Attending: INTERNAL MEDICINE
Payer: COMMERCIAL

## 2020-12-29 VITALS
RESPIRATION RATE: 16 BRPM | DIASTOLIC BLOOD PRESSURE: 82 MMHG | OXYGEN SATURATION: 99 % | TEMPERATURE: 99.6 F | HEART RATE: 64 BPM | SYSTOLIC BLOOD PRESSURE: 137 MMHG | BODY MASS INDEX: 26.43 KG/M2 | WEIGHT: 161.3 LBS

## 2020-12-29 DIAGNOSIS — I10 WHITE COAT SYNDROME WITH HYPERTENSION: Primary | ICD-10-CM

## 2020-12-29 DIAGNOSIS — I89.0 LYMPHEDEMA OF BOTH LOWER EXTREMITIES: ICD-10-CM

## 2020-12-29 DIAGNOSIS — E78.49 FAMILIAL HYPERLIPIDEMIA: ICD-10-CM

## 2020-12-29 DIAGNOSIS — F41.9 CHRONIC ANXIETY: ICD-10-CM

## 2020-12-29 DIAGNOSIS — R60.0 BILATERAL LOWER EXTREMITY EDEMA: ICD-10-CM

## 2020-12-29 PROCEDURE — 99214 OFFICE O/P EST MOD 30 MIN: CPT | Performed by: INTERNAL MEDICINE

## 2020-12-29 PROCEDURE — G0463 HOSPITAL OUTPT CLINIC VISIT: HCPCS

## 2020-12-29 RX ORDER — TORSEMIDE 20 MG/1
TABLET ORAL
Qty: 90 TABLET | Refills: 3 | COMMUNITY
Start: 2020-12-29 | End: 2021-05-20

## 2020-12-29 ASSESSMENT — ENCOUNTER SYMPTOMS
CHEST TIGHTNESS: 0
HEADACHES: 0
NERVOUS/ANXIOUS: 1
BRUISES/BLEEDS EASILY: 0
SINUS PRESSURE: 1
ADENOPATHY: 0
SHORTNESS OF BREATH: 0
CHILLS: 0
BACK PAIN: 0
HEMATURIA: 0
FEVER: 0
ABDOMINAL PAIN: 0
WOUND: 0
CONFUSION: 0

## 2020-12-29 ASSESSMENT — PAIN SCALES - GENERAL: PAINLEVEL: NO PAIN (0)

## 2020-12-29 NOTE — PATIENT INSTRUCTIONS
Blood pressures at home have improved significantly.... almost 40 point systolic drop in the past couple weeks... with dose increase of Lisinopril.     Start the Torsemide... 1/2 tablet (10 mg) -- about every other day -- for swelling and edema / fluid retention.     Start the Lovastatin -- at least once or twice weekly - to help reduce heart attack risk reduction.       Blood pressure checks at home - check some in AM, some in Afternoon, some in Evening and record   -- bring these with you to your next appointment.     Goal blood pressures -- less than 140 and less than 90.    -- Ideally would like the numbers about 110-130 and 70-80.  -- If running higher or lower than this on regular basis, will need to adjust your medications.        -- Call with blood pressure update in about 1 month.     Return as needed for follow-up with Dr. Fitch.    Clinic : [phone]  Appointment line: [phone]

## 2020-12-29 NOTE — PROGRESS NOTES
Ms. Del Rosario is a 65 year old female who:  Patient presents with:  Follow Up: blood pressure      ICD-10-CM    1. White coat syndrome with hypertension  I10 torsemide (DEMADEX) 20 MG tablet   2. Bilateral lower extremity edema  R60.0 torsemide (DEMADEX) 20 MG tablet   3. Lymphedema of both lower extremities  I89.0    4. Chronic anxiety  F41.9    5. Familial hyperlipidemia  E78.49      HPI  Patient presents for hypertension follow-up.  Her blood pressures at home are initially in the 160s and 170s.  We did increase her lisinopril at her last appointment.  She has been checking blood pressures numerous times at home since that time.  Blood pressures the last few days have dropped down into the 130s and 140 systolic.  Overall she is feeling quite well.  She has not yet started the torsemide to help with some of her lower extremity edema.  She does chronically have lymphedema but she also has some pitting edema.  At this point since her blood pressures have improved so significantly, recommend just starting with half of a tablet or 10 mg of torsemide every other day.  Adjust dose as needed.    She has severe whitecoat hypertension.  Her blood pressures upon clinic arrival today were in the 230 systolic over 120 range.  Her blood pressures at home prior to coming in were fine.  She gets very anxious.  Lots of reassurance provided.  Her blood pressures have improved significantly.  Continue lisinopril 20 mg daily.  Start torsemide.    She also has familial hyperlipidemia and lovastatin prescription was sent at her last appointment but was not yet started.  Did encourage her to start this once or twice weekly and adjust dose as tolerated.    Functional Capacity: > 4 METS.   Review of Systems   Constitutional: Negative for chills and fever.   HENT: Positive for congestion, hearing loss (-- low tones) and sinus pressure.    Eyes: Negative for visual disturbance.   Respiratory: Negative for chest tightness and shortness of  "breath.    Cardiovascular: Positive for leg swelling (+ lymphedema of legs + pitting edema). Negative for chest pain.   Gastrointestinal: Negative for abdominal pain.   Endocrine:        + osteoporosis   Genitourinary: Negative for hematuria.        Hx urterine cancer s/p radiation --> lymphedema   Musculoskeletal: Negative for back pain.   Skin: Negative for rash and wound.   Neurological: Negative for syncope and headaches.        Post-herpetic neuralgia - left flank   Hematological: Negative for adenopathy. Does not bruise/bleed easily.   Psychiatric/Behavioral: Negative for confusion. The patient is nervous/anxious.         Reviewed and updated as needed this visit by Provider   Tobacco  Allergies  Meds  Problems  Med Hx  Surg Hx  Fam Hx          EXAM:   Vitals:    12/29/20 1514 12/29/20 1522 12/29/20 1544   BP: (!) 238/120 (!) 230/116 137/82   BP Location: Right arm Left arm Right arm   Patient Position: Sitting Sitting Sitting   Cuff Size: Adult Regular Adult Regular    Pulse: 64     Resp: 16     Temp: 99.6  F (37.6  C)     TempSrc: Tympanic     SpO2: 99%     Weight: 73.2 kg (161 lb 4.8 oz)         Current Pain Score: No Pain (0)     BP Readings from Last 3 Encounters:   12/29/20 137/82   12/15/20 (!) 180/100   12/19/19 132/70      Wt Readings from Last 3 Encounters:   12/29/20 73.2 kg (161 lb 4.8 oz)   12/15/20 73.8 kg (162 lb 12.8 oz)   12/19/19 73.9 kg (163 lb)      Estimated body mass index is 26.43 kg/m  as calculated from the following:    Height as of 12/15/20: 1.664 m (5' 5.5\").    Weight as of this encounter: 73.2 kg (161 lb 4.8 oz).     Physical Exam  Constitutional:       General: She is not in acute distress.     Appearance: She is well-developed. She is not diaphoretic.   HENT:      Head: Normocephalic and atraumatic.   Eyes:      General: No scleral icterus.     Conjunctiva/sclera: Conjunctivae normal.   Neck:      Musculoskeletal: Neck supple.      Vascular: No carotid bruit. "   Cardiovascular:      Rate and Rhythm: Normal rate and regular rhythm.      Pulses: Normal pulses.   Pulmonary:      Effort: Pulmonary effort is normal.      Breath sounds: Normal breath sounds.   Abdominal:      Palpations: Abdomen is soft.      Tenderness: There is no abdominal tenderness.   Musculoskeletal:         General: No deformity.      Right lower le+ Pitting Edema present.      Left lower le+ Pitting Edema present.      Comments: + lymphedema bilateral legs + pitting edema   Lymphadenopathy:      Cervical: No cervical adenopathy.   Skin:     General: Skin is warm and dry.      Findings: No rash.   Neurological:      Mental Status: She is alert and oriented to person, place, and time. Mental status is at baseline.   Psychiatric:         Mood and Affect: Mood normal.         Behavior: Behavior normal.        Procedures   INVESTIGATIONS:  - Labs reviewed in Epic     ASSESSMENT AND PLAN:  Problem List Items Addressed This Visit        Endocrine    Familial hyperlipidemia       Circulatory    White coat syndrome with hypertension - Primary    Relevant Medications    torsemide (DEMADEX) 20 MG tablet       Musculoskeletal and Integumentary    Lymphedema of both lower extremities    Bilateral lower extremity edema    Relevant Medications    torsemide (DEMADEX) 20 MG tablet       Other    Chronic anxiety        reviewed diet, exercise and weight control, recommended sodium restriction, cardiovascular risk and specific lipid/LDL goals reviewed  -- Expected clinical course discussed    -- Medications and their side effects discussed    The 10-year ASCVD risk score (Willajefry MCCALLUM Jr., et al., 2013) is: 9.7%    Values used to calculate the score:      Age: 65 years      Sex: Female      Is Non- : No      Diabetic: No      Tobacco smoker: No      Systolic Blood Pressure: 137 mmHg      Is BP treated: Yes      HDL Cholesterol: 49 mg/dL      Total Cholesterol: 275 mg/dL    Patient Instructions    Blood pressures at home have improved significantly.... almost 40 point systolic drop in the past couple weeks... with dose increase of Lisinopril.     Start the Torsemide... 1/2 tablet (10 mg) -- about every other day -- for swelling and edema / fluid retention.     Start the Lovastatin -- at least once or twice weekly - to help reduce heart attack risk reduction.       Blood pressure checks at home - check some in AM, some in Afternoon, some in Evening and record   -- bring these with you to your next appointment.     Goal blood pressures -- less than 140 and less than 90.    -- Ideally would like the numbers about 110-130 and 70-80.  -- If running higher or lower than this on regular basis, will need to adjust your medications.        -- Call with blood pressure update in about 1 month.     Return as needed for follow-up with Dr. Fitch.    Clinic : [phone]  Appointment line: [phone]     Chris Fitch MD   Internal Medicine  [facility]     Portions of this note were dictated using speech recognition software. The note has been proofread but errors in the text may have been overlooked. Please contact me if there are any concerns regarding the accuracy of the dictation.

## 2020-12-29 NOTE — NURSING NOTE
Chief Complaint   Patient presents with     Follow Up     blood pressure       Medication Reconciliation complete.   Sherley Krause LPN  ..................12/29/2020   3:14 PM

## 2021-01-22 ENCOUNTER — MYC MEDICAL ADVICE (OUTPATIENT)
Dept: INTERNAL MEDICINE | Facility: OTHER | Age: 66
End: 2021-01-22

## 2021-01-22 DIAGNOSIS — I10 BENIGN ESSENTIAL HYPERTENSION: Primary | ICD-10-CM

## 2021-01-26 ENCOUNTER — TELEPHONE (OUTPATIENT)
Dept: INTERNAL MEDICINE | Facility: OTHER | Age: 66
End: 2021-01-26

## 2021-01-26 NOTE — TELEPHONE ENCOUNTER
DME rx for blood pressure cuff printed.     Please fax to pharmacy... or have patient  Rx.     Chris Fitch MD

## 2021-01-26 NOTE — TELEPHONE ENCOUNTER
HUMPHREY Fitch-pt reports can only get her bp monitor filled in [place]. Would like it sent to them-same place she gets her compression nylons. Call pt if questions. Thank you.  Maribell Rodarte

## 2021-05-13 ENCOUNTER — MYC MEDICAL ADVICE (OUTPATIENT)
Dept: INTERNAL MEDICINE | Facility: OTHER | Age: 66
End: 2021-05-13

## 2021-05-13 NOTE — TELEPHONE ENCOUNTER
There is no need to get COVID-19 testing if she has no symptoms and has not been exposed.    Yes, would recommend getting vaccinated. Consider the Pfizer vaccine.     If she would like to schedule a telephone appointment, we can discuss vaccinations and how the new mRNA vaccines work.  We could do a telephone appointment sometime in the middle of the afternoon on Friday 5/14.    Otherwise if she would prefer to come into the office, okay to use a same-day spot next week.    Chris Fitch MD

## 2021-05-14 NOTE — TELEPHONE ENCOUNTER
Patient is scheduled with DJS on May 20, 2021 at 3:40pm.     Sabrina Pinon on 5/14/2021 at 3:05 PM

## 2021-05-20 ENCOUNTER — OFFICE VISIT (OUTPATIENT)
Dept: INTERNAL MEDICINE | Facility: OTHER | Age: 66
End: 2021-05-20
Attending: INTERNAL MEDICINE
Payer: COMMERCIAL

## 2021-05-20 VITALS
WEIGHT: 165.4 LBS | DIASTOLIC BLOOD PRESSURE: 88 MMHG | HEART RATE: 78 BPM | OXYGEN SATURATION: 98 % | RESPIRATION RATE: 16 BRPM | SYSTOLIC BLOOD PRESSURE: 138 MMHG | BODY MASS INDEX: 27.11 KG/M2 | TEMPERATURE: 98.8 F

## 2021-05-20 DIAGNOSIS — R60.0 BILATERAL LOWER EXTREMITY EDEMA: ICD-10-CM

## 2021-05-20 DIAGNOSIS — I10 WHITE COAT SYNDROME WITH HYPERTENSION: ICD-10-CM

## 2021-05-20 DIAGNOSIS — Z71.85 VACCINE COUNSELING: Primary | ICD-10-CM

## 2021-05-20 DIAGNOSIS — I10 BENIGN ESSENTIAL HYPERTENSION: ICD-10-CM

## 2021-05-20 DIAGNOSIS — I89.0 LYMPHEDEMA OF BOTH LOWER EXTREMITIES: ICD-10-CM

## 2021-05-20 DIAGNOSIS — E78.49 FAMILIAL HYPERLIPIDEMIA: ICD-10-CM

## 2021-05-20 PROCEDURE — 99214 OFFICE O/P EST MOD 30 MIN: CPT | Performed by: INTERNAL MEDICINE

## 2021-05-20 PROCEDURE — G0463 HOSPITAL OUTPT CLINIC VISIT: HCPCS

## 2021-05-20 RX ORDER — LISINOPRIL 20 MG/1
20 TABLET ORAL DAILY
Qty: 90 TABLET | Refills: 3 | Status: SHIPPED | OUTPATIENT
Start: 2021-05-20 | End: 2022-03-30

## 2021-05-20 RX ORDER — TORSEMIDE 10 MG/1
TABLET ORAL
Qty: 180 TABLET | OUTPATIENT
Start: 2021-05-20

## 2021-05-20 RX ORDER — TORSEMIDE 10 MG/1
10-20 TABLET ORAL DAILY
Qty: 90 TABLET | Refills: 3 | Status: SHIPPED | OUTPATIENT
Start: 2021-05-20 | End: 2022-03-30

## 2021-05-20 ASSESSMENT — ENCOUNTER SYMPTOMS
SHORTNESS OF BREATH: 0
HEADACHES: 0
CHEST TIGHTNESS: 0
FEVER: 0
WOUND: 0
ADENOPATHY: 0
NERVOUS/ANXIOUS: 1
BRUISES/BLEEDS EASILY: 0
ABDOMINAL PAIN: 0
HEMATURIA: 0
SINUS PRESSURE: 1
CHILLS: 0
BACK PAIN: 0
CONFUSION: 0

## 2021-05-20 ASSESSMENT — PAIN SCALES - GENERAL: PAINLEVEL: NO PAIN (0)

## 2021-05-20 NOTE — TELEPHONE ENCOUNTER
[facility] sent Rx request for the following:   torsemide (DEMADEX) 10 MG tablet  Sig: TAKE 1 TO 2 TABLETS(10 TO 20 MG) BY MOUTH DAILY      Unable to refill due to direction in range format, prescribed purposefully by provider today, 05/20/2021. Unable to complete prescription refill per RN Medication Refill Policy.................... Tosin Tuttle RN ....................  5/20/2021   4:34 PM

## 2021-05-20 NOTE — PATIENT INSTRUCTIONS
Immunization History   Administered Date(s) Administered     TDAP Vaccine (Boostrix) 02/19/2009        -- Get the 2 shot COVID-19 Vaccination series (1st shot when available and 2nd shot 17 to 21 days later)    -- Consider the **Pfizer** vaccine --    -- (No vaccines for 2 weeks Before or After Covid Vaccine) --    Home blood pressure measurements are very good.     Medications refilled.     Return as needed for follow-up with Dr. Fitch.    Clinic : [phone]  Appointment line: [phone]

## 2021-05-20 NOTE — PROGRESS NOTES
Medication Reconciliation: complete   Patient here for questions on the covid vaccine. Patient states she thought she had CHF and has a family HX of it. Patient is concerned of blood clots.       Sherley Krause LPN  5/20/2021 3:45 PM

## 2021-05-20 NOTE — PROGRESS NOTES
Ms. Del Rosario is a 65 year old female who presents to the clinic for evaluation of the following problems:      ICD-10-CM    1. Vaccine counseling  Z71.89    2. White coat syndrome with hypertension  I10    3. Benign essential hypertension  I10 lisinopril (ZESTRIL) 20 MG tablet     torsemide (DEMADEX) 10 MG tablet   4. Lymphedema of both lower extremities  I89.0    5. Bilateral lower extremity edema  R60.0 torsemide (DEMADEX) 10 MG tablet   6. Familial hyperlipidemia  E78.49      HPI  Patient presents for vaccine counseling, she has numerous questions about the COVID-19 vaccination.  She also requires follow-up on her hypertension and needs medication refills today.    After discussion with patient, she plans to proceed with COVID-19 vaccination and wants to get scheduled for a Pfizer vaccine.    She has whitecoat hypertension with underlying hypertension.  Blood pressure today in the clinic were very high at 210/90 repeat was 190/90.  At home her blood pressures have been very good in the 120s and 130s systolic.  She checks them quite frequently and brings her records in today.  Given those numbers, continue lisinopril 20 mg daily.  She would also like refills of her torsemide 10 mg tablet.  She takes these on occasion for pitting edema.  She has chronic lymphedema of both lower extremities due to history of uterine cancer status post radiation therapy.    Familial hyperlipidemia, never started lovastatin and this was removed from her med list today.    Functional Capacity: > 4 METS.   Review of Systems   Constitutional: Negative for chills and fever.   HENT: Positive for congestion, hearing loss (-- low tones) and sinus pressure.    Eyes: Negative for visual disturbance.   Respiratory: Negative for chest tightness and shortness of breath.    Cardiovascular: Positive for leg swelling (+ lymphedema of legs + pitting edema). Negative for chest pain.   Gastrointestinal: Negative for abdominal pain.   Endocrine:        +  "osteoporosis   Genitourinary: Negative for hematuria.        Hx urterine cancer s/p radiation --> lymphedema   Musculoskeletal: Negative for back pain.   Skin: Negative for rash and wound.   Neurological: Negative for syncope and headaches.        Hx of Post-herpetic neuralgia - left flank   Hematological: Negative for adenopathy. Does not bruise/bleed easily.   Psychiatric/Behavioral: Negative for confusion. The patient is nervous/anxious.       Reviewed and updated as needed this visit by Provider   Tobacco  Allergies  Meds  Problems  Med Hx  Surg Hx  Fam Hx          EXAM:   Vitals:    05/20/21 1545 05/20/21 1550   BP: (!) 210/90 138/88   BP Location: Right arm Right arm   Patient Position: Sitting Sitting   Cuff Size: Adult Regular Adult Regular   Pulse: 78    Resp: 16    Temp: 98.8  F (37.1  C)    TempSrc: Tympanic    SpO2: 98%    Weight: 75 kg (165 lb 6.4 oz)        Current Pain Score: No Pain (0)     BP Readings from Last 3 Encounters:   05/20/21 138/88   12/29/20 137/82   12/15/20 (!) 180/100      Wt Readings from Last 3 Encounters:   05/20/21 75 kg (165 lb 6.4 oz)   12/29/20 73.2 kg (161 lb 4.8 oz)   12/15/20 73.8 kg (162 lb 12.8 oz)      Estimated body mass index is 27.11 kg/m  as calculated from the following:    Height as of 12/15/20: 1.664 m (5' 5.5\").    Weight as of this encounter: 75 kg (165 lb 6.4 oz).     Physical Exam  Constitutional:       General: She is not in acute distress.     Appearance: She is well-developed. She is not diaphoretic.   HENT:      Head: Normocephalic and atraumatic.   Eyes:      General: No scleral icterus.     Conjunctiva/sclera: Conjunctivae normal.   Neck:      Musculoskeletal: Neck supple.      Vascular: No carotid bruit.   Cardiovascular:      Rate and Rhythm: Normal rate and regular rhythm.      Pulses: Normal pulses.   Pulmonary:      Effort: Pulmonary effort is normal.      Breath sounds: Normal breath sounds.   Abdominal:      Palpations: Abdomen is soft.      " Tenderness: There is no abdominal tenderness.   Musculoskeletal:         General: No deformity.      Right lower le+ Pitting Edema present.      Left lower le+ Pitting Edema present.      Comments: + lymphedema bilateral legs + pitting edema   Lymphadenopathy:      Cervical: No cervical adenopathy.   Skin:     General: Skin is warm and dry.      Findings: No rash.   Neurological:      Mental Status: She is alert and oriented to person, place, and time. Mental status is at baseline.   Psychiatric:         Mood and Affect: Mood normal.         Behavior: Behavior normal.        Procedures   INVESTIGATIONS:  - Labs reviewed in Epic     ASSESSMENT AND PLAN:  Problem List Items Addressed This Visit        Endocrine    Familial hyperlipidemia       Circulatory    White coat syndrome with hypertension    Relevant Medications    lisinopril (ZESTRIL) 20 MG tablet    torsemide (DEMADEX) 10 MG tablet    Benign essential hypertension    Relevant Medications    lisinopril (ZESTRIL) 20 MG tablet    torsemide (DEMADEX) 10 MG tablet       Musculoskeletal and Integumentary    Lymphedema of both lower extremities    Bilateral lower extremity edema    Relevant Medications    torsemide (DEMADEX) 10 MG tablet      Other Visit Diagnoses     Vaccine counseling    -  Primary        reviewed diet, exercise and weight control, recommended sodium restriction, cardiovascular risk and specific lipid/LDL goals reviewed  -- Expected clinical course discussed    -- Medications and their side effects discussed    The 10-year ASCVD risk score ([place]ielsvillejefry MCCALLUM Jr., et al., 2013) is: 9.8%    Values used to calculate the score:      Age: 65 years      Sex: Female      Is Non- : No      Diabetic: No      Tobacco smoker: No      Systolic Blood Pressure: 138 mmHg      Is BP treated: Yes      HDL Cholesterol: 49 mg/dL      Total Cholesterol: 275 mg/dL    Patient Instructions     Immunization History   Administered Date(s)  Administered     TDAP Vaccine (Boostrix) 02/19/2009        -- Get the 2 shot COVID-19 Vaccination series (1st shot when available and 2nd shot 17 to 21 days later)    -- Consider the **Pfizer** vaccine --    -- (No vaccines for 2 weeks Before or After Covid Vaccine) --    Home blood pressure measurements are very good.     Medications refilled.     Return as needed for follow-up with Dr. Fitch.    Clinic : [phone]  Appointment line: [phone]     Chris Fitch MD  Internal Medicine  [facility]

## 2021-05-27 ENCOUNTER — IMMUNIZATION (OUTPATIENT)
Dept: FAMILY MEDICINE | Facility: OTHER | Age: 66
End: 2021-05-27
Attending: FAMILY MEDICINE
Payer: COMMERCIAL

## 2021-05-27 PROCEDURE — 91300 PR COVID VAC PFIZER DIL RECON 30 MCG/0.3 ML IM: CPT

## 2021-06-08 ENCOUNTER — TRANSFERRED RECORDS (OUTPATIENT)
Dept: HEALTH INFORMATION MANAGEMENT | Facility: OTHER | Age: 66
End: 2021-06-08

## 2021-06-17 ENCOUNTER — IMMUNIZATION (OUTPATIENT)
Dept: FAMILY MEDICINE | Facility: OTHER | Age: 66
End: 2021-06-17
Attending: FAMILY MEDICINE
Payer: COMMERCIAL

## 2021-06-17 PROCEDURE — 91300 PR COVID VAC PFIZER DIL RECON 30 MCG/0.3 ML IM: CPT

## 2021-07-30 ENCOUNTER — TRANSFERRED RECORDS (OUTPATIENT)
Dept: HEALTH INFORMATION MANAGEMENT | Facility: OTHER | Age: 66
End: 2021-07-30

## 2021-08-11 ENCOUNTER — OFFICE VISIT (OUTPATIENT)
Dept: FAMILY MEDICINE | Facility: OTHER | Age: 66
End: 2021-08-11
Attending: NURSE PRACTITIONER
Payer: COMMERCIAL

## 2021-08-11 ENCOUNTER — TELEPHONE (OUTPATIENT)
Dept: INTERNAL MEDICINE | Facility: OTHER | Age: 66
End: 2021-08-11

## 2021-08-11 VITALS
BODY MASS INDEX: 25.77 KG/M2 | RESPIRATION RATE: 20 BRPM | DIASTOLIC BLOOD PRESSURE: 102 MMHG | HEART RATE: 63 BPM | OXYGEN SATURATION: 98 % | SYSTOLIC BLOOD PRESSURE: 186 MMHG | HEIGHT: 66 IN | TEMPERATURE: 98 F | WEIGHT: 160.31 LBS

## 2021-08-11 DIAGNOSIS — R21 RASH: ICD-10-CM

## 2021-08-11 DIAGNOSIS — T14.8XXA SPLINTER: Primary | ICD-10-CM

## 2021-08-11 PROCEDURE — 99213 OFFICE O/P EST LOW 20 MIN: CPT | Mod: 25 | Performed by: NURSE PRACTITIONER

## 2021-08-11 PROCEDURE — 10120 INC&RMVL FB SUBQ TISS SMPL: CPT | Mod: 52 | Performed by: NURSE PRACTITIONER

## 2021-08-11 PROCEDURE — 250N000009 HC RX 250: Performed by: NURSE PRACTITIONER

## 2021-08-11 PROCEDURE — G0463 HOSPITAL OUTPT CLINIC VISIT: HCPCS | Mod: 25

## 2021-08-11 PROCEDURE — 10120 INC&RMVL FB SUBQ TISS SMPL: CPT | Performed by: NURSE PRACTITIONER

## 2021-08-11 PROCEDURE — G0463 HOSPITAL OUTPT CLINIC VISIT: HCPCS

## 2021-08-11 RX ORDER — LIDOCAINE HYDROCHLORIDE 10 MG/ML
5 INJECTION, SOLUTION EPIDURAL; INFILTRATION; INTRACAUDAL; PERINEURAL ONCE
Status: COMPLETED | OUTPATIENT
Start: 2021-08-11 | End: 2021-08-11

## 2021-08-11 RX ORDER — SWAB
1 SWAB, NON-MEDICATED MISCELLANEOUS DAILY
COMMUNITY
Start: 2021-08-11 | End: 2022-03-30

## 2021-08-11 RX ORDER — CEPHALEXIN 500 MG/1
500 CAPSULE ORAL 2 TIMES DAILY
Qty: 14 CAPSULE | Refills: 0 | Status: SHIPPED | OUTPATIENT
Start: 2021-08-11 | End: 2021-08-18

## 2021-08-11 RX ORDER — NYSTATIN 100000 U/G
CREAM TOPICAL 2 TIMES DAILY
Qty: 30 G | Refills: 0 | Status: SHIPPED | OUTPATIENT
Start: 2021-08-11 | End: 2021-08-21

## 2021-08-11 RX ADMIN — LIDOCAINE HYDROCHLORIDE 5 ML: 10 INJECTION, SOLUTION EPIDURAL; INFILTRATION; INTRACAUDAL; PERINEURAL at 11:01

## 2021-08-11 ASSESSMENT — PAIN SCALES - GENERAL: PAINLEVEL: MILD PAIN (2)

## 2021-08-11 ASSESSMENT — MIFFLIN-ST. JEOR: SCORE: 1285.98

## 2021-08-11 NOTE — NURSING NOTE
Patient presents to clinic experiencing pain and discomfort to bottom of left foot.  She states there is a sliver of something there, area is reddened .  Medication Reconciliation: complete    June Dunn LPN

## 2021-08-11 NOTE — TELEPHONE ENCOUNTER
Patient was seen by Aurora Patel NP at [facility] today 08/11/21 for below concern.  Allegra Cronin LPN 8/11/2021   11:36 AM

## 2021-08-11 NOTE — TELEPHONE ENCOUNTER
Reason for call: Patient wanting a work in appointment.    Patient is having the following symptoms:  Sliver in Bottom of Left Foot for 2 days??? - Unsure how long it has been there, very concerned about infection    The patient is requesting an appointment with  D[facility]    Was an appointment offered for this call? No    If Yes, what is the date of the appointment?  N/A     Preferred method for responding to this message: Telephone Call    Phone number patient can be reached at? Cell number on file:    Telephone Information:   Mobile [phone]       If we can't reach you directly, may we leave a detailed response at the number you provided? No - No voicemail - can leave Edge[employer] message    Can this message wait until your PCP/provider returns if unavailable today? N/A

## 2021-08-11 NOTE — PROGRESS NOTES
ASSESSMENT/PLAN:  1. Splinter    - lidocaine (PF) (XYLOCAINE) 1 % injection 5 mL  - cephALEXin (KEFLEX) 500 MG capsule; Take 1 capsule (500 mg) by mouth 2 times daily for 7 days  Dispense: 14 capsule; Refill: 0    2. Rash    - nystatin (MYCOSTATIN) 073540 UNIT/GM external cream; Apply topically 2 times daily for 10 days  Dispense: 30 g; Refill: 0    Procedural note:   Options are discussed and the patient decided to proceed with removal of the foreign body. Time out was called. Patient was prepped and draped in a proper sterile manner. Under sterile conditions, 1 cc 1% Lidocaine without epi was used to numb the surrounding area. An #11 blade was used to make a linear incision.  The foreign body was then removed and appeared to be a thin black course foreign body. There was still a piece remaining that I was unable to remove. However, the area that was opened was cleansed with normal saline and hibiclens.  Hemostasis was achieved with direct pressure. The patient tolerated the procedure well. No complications were appreciated.     Discussed with the patient that she will be prescribed keflex BID x 7 days due to being unable to remove a small segment of the black foreign body. The patient was instructed to cleanse the affected area BID, dry and then apply neosporin.     The patient was also prescribed nystatin cream BID x 10 days for the erythema and odor to her umbilicus, for suspected yeast infection.      Discussed warning signs/symptoms indicative of need to f/u    Follow up if symptoms persist or worsen or concerns      I explained my diagnostic considerations and recommendations to the patient, who voiced understanding and agreement with the treatment plan. All questions were answered. We discussed potential side effects of any prescribed or recommended therapies, as well as expectations for response to treatments.        HPI:    Eloisa Del Rosario is a 65 year old female  who presents to [facility] today for a  possible sliver or something in her left foot. It is toward the heal of her posterior foot. Believes that it may have occurred 4 days ago. Tender with palpation or ambulating. The patient is also concerned regarding erythema and odor of her umbilicus. She reports noticing the erythema to her umbilicus this morning. Denies itching to the area of erythema.      Past Medical History:   Diagnosis Date     Non-Hodgkin lymphoma of extranodal and solid organ sites (H)     05/28/1991,Exploratory lap. retroperitoneal lymph node dissection     Personal history of irradiation     Radiation therapy, cesium implants for cervical cancer     Past Surgical History:   Procedure Laterality Date     COLONOSCOPY      1991     LAPAROTOMY EXPLORATORY      Exploratory lap.retroperitoneal lymph node dissection     SALPINGO-OOPHORECTOMY BILATERAL      Removal of left tube and ovary     Social History     Tobacco Use     Smoking status: Never Smoker     Smokeless tobacco: Never Used   Substance Use Topics     Alcohol use: No     Alcohol/week: 0.0 standard drinks     Current Outpatient Medications   Medication Sig Dispense Refill     acetaminophen (TYLENOL) 650 MG CR tablet Take 1 tablet by mouth every 8 hours as needed for pain Max acetaminophen dose: 4000 mg in 24 hrs       Blood Pressure Monitoring (SELF-TAKING BLOOD PRESSURE) KIT As directed. Dispense what insurance will cover       lisinopril (ZESTRIL) 20 MG tablet Take 1 tablet (20 mg) by mouth daily 90 tablet 3     order for DME Equipment being ordered: thigh high compression nylons, size medium, strength 20-30 2 Package 11     torsemide (DEMADEX) 10 MG tablet Take 1-2 tablets (10-20 mg) by mouth daily 90 tablet 3     vitamin D3 (CHOLECALCIFEROL) 10 MCG (400 UNIT) capsule Take 1 capsule (400 Units) by mouth daily       Allergies   Allergen Reactions     Erythromycin Itching, Rash, Shortness Of Breath and Hives     Other [No Clinical Screening - See Comments] Anaphylaxis     Cannot have  "any \"myacin's\".          Past medical history, past surgical history, current medications and allergies reviewed and accurate to the best of my knowledge.        ROS:  Refer to HPI    BP (!) 170/86 (BP Location: Right arm, Patient Position: Sitting, Cuff Size: Adult Regular)   Pulse 63   Temp 98  F (36.7  C) (Tympanic)   Resp 20   Ht 1.664 m (5' 5.5\")   Wt 72.7 kg (160 lb 5 oz)   LMP  (LMP Unknown)   SpO2 98%   Breastfeeding No   BMI 26.27 kg/m      EXAM:  General Appearance: Well appearing female, appropriate appearance for age. No acute distress  Musculoskeletal:  Equal movement of bilateral upper extremities.  Equal movement of bilateral lower extremities.  Normal gait.    Dermatological: Appears to be black foreign body to the plantar surface of the patient's left foot that is palpated with mild surrounding erythema. Erythema noted to the patient's umbilicus, no discharge noted on exam.   Psychological: normal affect, alert, oriented, and pleasant.             "

## 2021-10-07 DIAGNOSIS — I10 BENIGN ESSENTIAL HYPERTENSION: ICD-10-CM

## 2021-10-11 RX ORDER — LISINOPRIL 20 MG/1
TABLET ORAL
Qty: 90 TABLET | Refills: 3 | OUTPATIENT
Start: 2021-10-11

## 2021-10-11 NOTE — TELEPHONE ENCOUNTER
lisinopril (ZESTRIL) 20 MG tablet 90 tablet 3 5/20/2021  No   Sig - Route: Take 1 tablet (20 mg) by mouth daily - Oral     To Jozef De Luna RN on 10/11/2021 at 11:44 AM

## 2022-01-21 ENCOUNTER — MYC MEDICAL ADVICE (OUTPATIENT)
Dept: INTERNAL MEDICINE | Facility: OTHER | Age: 67
End: 2022-01-21
Payer: COMMERCIAL

## 2022-01-21 DIAGNOSIS — B37.0 ORAL THRUSH: Primary | ICD-10-CM

## 2022-01-21 RX ORDER — CLOTRIMAZOLE 10 MG/1
10 LOZENGE ORAL
Qty: 70 LOZENGE | Refills: 0 | Status: SHIPPED | OUTPATIENT
Start: 2022-01-21 | End: 2022-03-30

## 2022-01-21 NOTE — TELEPHONE ENCOUNTER
Overdue for Annual Medicare wellness visit / physical.     Please call patient and schedule.  Okay to use 4 PM -- 40 minute spot at the end of the day if needed.      + Med sent to pharmacy for her thrush.       1. Oral thrush  START:   - clotrimazole (MYCELEX) 10 MG lozenge; Take 1 lozenge (10 mg) by mouth 5 times daily -- as needed for thrush  Dispense: 70 lozenge; Refill: 0      Electronically signed by:  Chris Fitch MD  on January 21, 2022 8:46 AM

## 2022-01-21 NOTE — TELEPHONE ENCOUNTER
Patient is scheduled for 2/23/22 for her Medicare Wellness. She was unsure if she wanted to come in earlier due to the current covid surge.

## 2022-02-20 ENCOUNTER — HEALTH MAINTENANCE LETTER (OUTPATIENT)
Age: 67
End: 2022-02-20

## 2022-03-29 ASSESSMENT — ACTIVITIES OF DAILY LIVING (ADL): CURRENT_FUNCTION: NO ASSISTANCE NEEDED

## 2022-03-30 ENCOUNTER — OFFICE VISIT (OUTPATIENT)
Dept: INTERNAL MEDICINE | Facility: OTHER | Age: 67
End: 2022-03-30
Attending: INTERNAL MEDICINE
Payer: COMMERCIAL

## 2022-03-30 VITALS
HEIGHT: 66 IN | SYSTOLIC BLOOD PRESSURE: 138 MMHG | TEMPERATURE: 98.2 F | RESPIRATION RATE: 16 BRPM | DIASTOLIC BLOOD PRESSURE: 88 MMHG | BODY MASS INDEX: 25.23 KG/M2 | OXYGEN SATURATION: 97 % | HEART RATE: 67 BPM | WEIGHT: 157 LBS

## 2022-03-30 DIAGNOSIS — E53.8 VITAMIN B12 DEFICIENCY: ICD-10-CM

## 2022-03-30 DIAGNOSIS — I10 BENIGN ESSENTIAL HYPERTENSION: ICD-10-CM

## 2022-03-30 DIAGNOSIS — E55.9 VITAMIN D DEFICIENCY: ICD-10-CM

## 2022-03-30 DIAGNOSIS — Z71.85 VACCINE COUNSELING: ICD-10-CM

## 2022-03-30 DIAGNOSIS — I89.0 LYMPHEDEMA OF BOTH LOWER EXTREMITIES: ICD-10-CM

## 2022-03-30 DIAGNOSIS — I10 UNCONTROLLED HYPERTENSION: Primary | ICD-10-CM

## 2022-03-30 DIAGNOSIS — Z00.00 ENCOUNTER FOR MEDICARE ANNUAL WELLNESS EXAM: ICD-10-CM

## 2022-03-30 DIAGNOSIS — I10 WHITE COAT SYNDROME WITH HYPERTENSION: ICD-10-CM

## 2022-03-30 DIAGNOSIS — E78.49 FAMILIAL HYPERLIPIDEMIA: ICD-10-CM

## 2022-03-30 LAB
ALBUMIN UR-MCNC: NEGATIVE MG/DL
ANION GAP SERPL CALCULATED.3IONS-SCNC: 6 MMOL/L (ref 3–14)
APPEARANCE UR: CLEAR
BILIRUB UR QL STRIP: NEGATIVE
BUN SERPL-MCNC: 15 MG/DL (ref 7–25)
CALCIUM SERPL-MCNC: 9.5 MG/DL (ref 8.6–10.3)
CHLORIDE BLD-SCNC: 102 MMOL/L (ref 98–107)
CHOLEST SERPL-MCNC: 302 MG/DL
CO2 SERPL-SCNC: 30 MMOL/L (ref 21–31)
COLOR UR AUTO: YELLOW
CREAT SERPL-MCNC: 0.81 MG/DL (ref 0.6–1.2)
DEPRECATED CALCIDIOL+CALCIFEROL SERPL-MC: 21 UG/L (ref 30–100)
ERYTHROCYTE [DISTWIDTH] IN BLOOD BY AUTOMATED COUNT: 13.3 % (ref 10–15)
FASTING STATUS PATIENT QL REPORTED: YES
GFR SERPL CREATININE-BSD FRML MDRD: 80 ML/MIN/1.73M2
GLUCOSE BLD-MCNC: 88 MG/DL (ref 70–105)
GLUCOSE UR STRIP-MCNC: NEGATIVE MG/DL
HCT VFR BLD AUTO: 42.5 % (ref 35–47)
HDLC SERPL-MCNC: 57 MG/DL (ref 23–92)
HGB BLD-MCNC: 13.7 G/DL (ref 11.7–15.7)
HGB UR QL STRIP: NEGATIVE
KETONES UR STRIP-MCNC: NEGATIVE MG/DL
LDLC SERPL CALC-MCNC: 217 MG/DL
LEUKOCYTE ESTERASE UR QL STRIP: NEGATIVE
MCH RBC QN AUTO: 29.1 PG (ref 26.5–33)
MCHC RBC AUTO-ENTMCNC: 32.2 G/DL (ref 31.5–36.5)
MCV RBC AUTO: 90 FL (ref 78–100)
NITRATE UR QL: NEGATIVE
NONHDLC SERPL-MCNC: 245 MG/DL
PH UR STRIP: 7 [PH] (ref 5–9)
PLATELET # BLD AUTO: 286 10E3/UL (ref 150–450)
POTASSIUM BLD-SCNC: 3.8 MMOL/L (ref 3.5–5.1)
RBC # BLD AUTO: 4.7 10E6/UL (ref 3.8–5.2)
RBC URINE: <1 /HPF
SODIUM SERPL-SCNC: 138 MMOL/L (ref 134–144)
SP GR UR STRIP: 1.01 (ref 1–1.03)
TRIGL SERPL-MCNC: 141 MG/DL
UROBILINOGEN UR STRIP-MCNC: NORMAL MG/DL
VIT B12 SERPL-MCNC: 144 PG/ML (ref 180–914)
WBC # BLD AUTO: 6.6 10E3/UL (ref 4–11)
WBC URINE: <1 /HPF

## 2022-03-30 PROCEDURE — G0463 HOSPITAL OUTPT CLINIC VISIT: HCPCS

## 2022-03-30 PROCEDURE — 99214 OFFICE O/P EST MOD 30 MIN: CPT | Mod: 25 | Performed by: INTERNAL MEDICINE

## 2022-03-30 PROCEDURE — 36415 COLL VENOUS BLD VENIPUNCTURE: CPT | Mod: ZL | Performed by: INTERNAL MEDICINE

## 2022-03-30 PROCEDURE — G0439 PPPS, SUBSEQ VISIT: HCPCS | Performed by: INTERNAL MEDICINE

## 2022-03-30 PROCEDURE — 85027 COMPLETE CBC AUTOMATED: CPT | Mod: ZL | Performed by: INTERNAL MEDICINE

## 2022-03-30 PROCEDURE — 80048 BASIC METABOLIC PNL TOTAL CA: CPT | Mod: ZL | Performed by: INTERNAL MEDICINE

## 2022-03-30 PROCEDURE — 81001 URINALYSIS AUTO W/SCOPE: CPT | Mod: ZL | Performed by: INTERNAL MEDICINE

## 2022-03-30 PROCEDURE — 82306 VITAMIN D 25 HYDROXY: CPT | Mod: ZL | Performed by: INTERNAL MEDICINE

## 2022-03-30 PROCEDURE — 82607 VITAMIN B-12: CPT | Mod: ZL | Performed by: INTERNAL MEDICINE

## 2022-03-30 PROCEDURE — 80061 LIPID PANEL: CPT | Mod: ZL | Performed by: INTERNAL MEDICINE

## 2022-03-30 RX ORDER — LISINOPRIL 20 MG/1
20 TABLET ORAL DAILY
Qty: 90 TABLET | Refills: 3 | Status: CANCELLED | OUTPATIENT
Start: 2022-03-30

## 2022-03-30 RX ORDER — CARVEDILOL 6.25 MG/1
3.125-6.25 TABLET ORAL 2 TIMES DAILY WITH MEALS
Qty: 180 TABLET | Refills: 0 | Status: SHIPPED | OUTPATIENT
Start: 2022-03-30 | End: 2022-05-27

## 2022-03-30 RX ORDER — LISINOPRIL 20 MG/1
20 TABLET ORAL 2 TIMES DAILY
Qty: 180 TABLET | Refills: 0 | Status: SHIPPED | OUTPATIENT
Start: 2022-03-30 | End: 2022-05-27

## 2022-03-30 RX ORDER — ROSUVASTATIN CALCIUM 20 MG/1
20 TABLET, COATED ORAL DAILY
Qty: 90 TABLET | Refills: 4 | Status: SHIPPED | OUTPATIENT
Start: 2022-03-30 | End: 2023-03-30

## 2022-03-30 ASSESSMENT — ACTIVITIES OF DAILY LIVING (ADL): CURRENT_FUNCTION: NO ASSISTANCE NEEDED

## 2022-03-30 ASSESSMENT — PAIN SCALES - GENERAL: PAINLEVEL: MODERATE PAIN (5)

## 2022-03-30 NOTE — PROGRESS NOTES
Assessment & Plan       ICD-10-CM    1. Uncontrolled hypertension  I10 lisinopril (ZESTRIL) 20 MG tablet     carvedilol (COREG) 6.25 MG tablet     Basic Metabolic Panel     UA with Microscopic     CBC W PLT No Diff     CBC W PLT No Diff     UA with Microscopic     Basic Metabolic Panel   2. Benign essential hypertension  I10 lisinopril (ZESTRIL) 20 MG tablet     carvedilol (COREG) 6.25 MG tablet   3. Familial hyperlipidemia  E78.49 rosuvastatin (CRESTOR) 20 MG tablet     Lipid Panel     Lipid Panel   4. Lymphedema of both lower extremities  I89.0    5. White coat syndrome with hypertension  I10    6. Encounter for Medicare annual wellness exam  Z00.00    7. Vitamin B12 deficiency  E53.8 Vitamin B12     Vitamin B12     vitamin B-12 (CYANOCOBALAMIN) 2500 MCG sublingual tablet   8. Vitamin D deficiency  E55.9 Vitamin D Total     Vitamin D Total     vitamin D3 (CHOLECALCIFEROL) 50 mcg (2000 units) tablet   9. Vaccine counseling  Z71.85    Patient presents for Medicare annual wellness visit as well as follow-up multiple issues.    Uncontrolled hypertension.  Blood pressure is very high.  Recheck improved slightly.  Advised to monitor at home.  Increase lisinopril and start carvedilol.  See AVS for starting instructions.  Advised to titrate dosing based on blood pressure response.  Check lab work.    Hypertension.  Blood pressures are high and not at goal.  Increase medications as noted above.    Familial hyperlipidemia.  Cholesterol levels are very high.  LDL is not at goal.  Start Crestor 20 mg daily.  Check vitamin D levels.    Vitamin D deficiency.  New diagnosis.  Start vitamin D replacement.    Vitamin B12 deficiency.  New diagnosis.  Start B12 replacement.    Patient has chronic lymphedema of the lower extremities.  Encouraged regular exercise and walking.    Vaccine counseling completed.  Encouraged consideration of shingles vaccination, tetanus shot, pneumococcal vaccination, COVID-19 booster shot.    Encouraged  "regular exercise.     Return in about 4 weeks (around 4/27/2022) for - Follow-up Hypertension.    Chris Fitch MD  [facility]   Eloisa is a 66 year old who presents for the following health issues     Healthy Habits:     In general, how would you rate your overall health?  Good    Frequency of exercise:  2-3 days/week    Duration of exercise:  Greater than 60 minutes    Do you usually eat at least 4 servings of fruit and vegetables a day, include whole grains    & fiber and avoid regularly eating high fat or \"junk\" foods?  Yes    Taking medications regularly:  Yes    Medication side effects:  Not applicable    Ability to successfully perform activities of daily living:  No assistance needed    Home Safety:  No safety concerns identified    Hearing Impairment:  No hearing concerns    In the past 6 months, have you been bothered by leaking of urine?  No    In general, how would you rate your overall mental or emotional health?  Good      PHQ-2 Total Score: 0    Additional concerns today:  Yes     Review current opioid prescriptions    For a patient with a current opioid prescription:    Reviewed potential Opioid Use Disorder (OUD) risk factors: Yes     Evaluate their pain severity and current treatment plan:     Provide information on non-opioid treatment options:    Refer to a specialist, as appropriate:    Get more information on pain management in the HHS Pain Management Best Practices Inter-agency Task Force Report    Screen for potential Substance Use Disorders (SUDs)    Reviewed the patient s potential risk factors for SUDs: Yes     Refer to treatment or specialist, as appropriate:     A screening tool isn t required but you may use one:  Find more information in the National Valent[employer] on Drug Abuse Screening and Assessment Tools Chart    Annual Wellness Visit    Patient has been advised of split billing requirements and indicates understanding: Yes     Are you in the " "first 12 months of your Medicare Part B coverage?  No      Do you feel safe in your environment? Yes    Have you ever done Advance Care Planning? (For example, a Health Directive, POLST, or a discussion with a medical provider or your loved ones about your wishes)? Yes, advance care planning is on file.    Fall risk:  Fallen 2 or more times in the past year?: No  Any fall with injury in the past year?: No    Cognitive Screenin) Repeat 3 items (Leader, Season, Table)    2) Clock draw: NORMAL  3) 3 item recall: Recalls 3 objects  Results: 3 items recalled: COGNITIVE IMPAIRMENT LESS LIKELY    Mini-CogTM Copyright S Julissa. Licensed by the author for use in [employer] itzat; reprinted with permission (marcos@.[facility]). All rights reserved.      Do you have sleep apnea, excessive snoring or daytime drowsiness?: no    Current providers sharing in care for this patient include:   Patient Care Team:  Chris Fitch MD as PCP - General (Internal Medicine)  Chris Fitch MD as Assigned PCP  Chris Fitch MD (Internal Medicine)    Patient has been advised of split billing requirements and indicates understanding: Yes    Review of Systems       Objective    /88   Pulse 67   Temp 98.2  F (36.8  C) (Temporal)   Resp 16   Ht 1.664 m (5' 5.5\")   Wt 71.2 kg (157 lb)   LMP  (LMP Unknown)   SpO2 97%   Breastfeeding No   BMI 25.73 kg/m    Body mass index is 25.73 kg/m .  Physical Exam     Recent Labs   Lab Test 22  1023 12/15/20  1657 19  1052   A1C  --  5.3 5.6   * 190* 192*   HDL 57 49 59   TRIG 141 180* 137   ALT  --  12 12   CR 0.81 0.87 0.78   GFRESTIMATED 80 66 75   GFRESTBLACK  --  79 >90   POTASSIUM 3.8 4.3 3.9   Vitamin D and vitamin B12 levels are both very low.  LDL is high and not at goal.  Triglycerides and HDL are at goal.  Creatinine is at baseline.  Potassium is normal.        "

## 2022-03-30 NOTE — PATIENT INSTRUCTIONS
3/30/2022:   AM:  Lisinopril 20 mg  PM:   Lisinopril 10 mg     3/31:  AM:  Lisinopril 20 mg  COREG 3.125 mg  PM:   Lisinopril 10 mg   COREG 3.125 mg    4/01:  AM:  Lisinopril 20 mg  COREG 3.125 mg  PM:   Lisinopril 10 mg   COREG 3.125 mg    Continue for about 4 days and adjust dose if needed.       Next step (#2) would be to increase Lisinopril to 20 mg in AM and PM -- if needed.       COREG can lower your pulse / heart rate...     Blood pressure checks at home - check some in AM, some in Afternoon, some in Evening and record   -- bring these with you to your next appointment.     Goal blood pressures -- less than 140 and less than 90.    -- Ideally would like the numbers about 110-130 and 70-80.  -- If running higher or lower than this on regular basis, will need to adjust your medications.        Immunization History   Administered Date(s) Administered     COVID-19,PF,Pfizer (12+ Yrs) 05/27/2021, 06/17/2021     TDAP Vaccine (Boostrix) 02/19/2009      -- The COVID-19 Booster shots are here.  (Does not have to be the same vaccine as before).    -- Moderna and Pfizer 3rd shots:   - If immunocompromised, can get 3rd shot -- 28+ days from date of 2nd shot.   - Otherwise - vaccine schedule is to get 3rd shot -- about 6 months from date of 2nd shot.     -- Consider Annual Flu / Influenza vaccination - Every Fall (Starting about October 1st)    -- Pneumonia / Pneumococcal PCV 23 vaccines x1 dose - Anytime after age 65.     -- Get your tetanus shot updated - from one of the local pharmacies, at your convenience or the Local Public Health Department.     -- Get the new shingles shot (2 in series) (Shingrix) - from one of the local pharmacies, at your convenience. -- Check cost/coverage.   -- or -- If these are very expensive, go to the Local Public Health Department     [facility] -- [address] [address]    ---- Shot clinic is the FIRST Thursday of  Each Month -- from 2 to 6 PM, by Appointment only.     Call for an appointment -- [phone]      Return in approximately 1 month(s), or sooner as needed for follow-up with Dr. Fitch.  -- follow-up Hypertension    Clinic : [phone]  Appointment line: [phone]   Patient Education   Personalized Prevention Plan  You are due for the preventive services outlined below.  Your care team is available to assist you in scheduling these services.  If you have already completed any of these items, please share that information with your care team to update in your medical record.  Health Maintenance Due   Topic Date Due     Zoster (Shingles) Vaccine (1 of 2) Never done     Diptheria Tetanus Pertussis (DTAP/TDAP/TD) Vaccine (2 - Td or Tdap) 02/19/2019     Pneumococcal Vaccine (1 of 1 - PPSV23) Never done     COVID-19 Vaccine (3 - Booster for Pfizer series) 11/17/2021     Preventive Health Recommendations    See your health care provider every year to    Review health changes.     Discuss preventive care.      Review your medicines if your doctor has prescribed any.    You no longer need a yearly Pap test unless you've had an abnormal Pap test in the past 10 years. If you have vaginal symptoms, such as bleeding or discharge, be sure to talk with your provider about a Pap test.    Every 1 to 2 years, have a mammogram.  If you are over 69, talk with your health care provider about whether or not you want to continue having screening mammograms.    Every 10 years, have a colonoscopy. Or, have a yearly FIT test (stool test). These exams will check for colon cancer.     Have a cholesterol test every 5 years, or more often if your doctor advises it.     Have a diabetes test (fasting glucose) every three years. If you are at risk for diabetes, you should have this test more often.     At age 65, have a bone density scan (DEXA) to check for osteoporosis (brittle bone disease).    Shots:    Get a flu shot each  year.    Get a tetanus shot every 10 years.    Talk to your doctor about your pneumonia vaccines. There are now two you should receive - Pneumovax (PPSV 23) and Prevnar (PCV 13).    Talk to your pharmacist about the shingles vaccine.    Talk to your doctor about the hepatitis B vaccine.    Nutrition:     Eat at least 5 servings of fruits and vegetables each day.    Eat whole-grain bread, whole-wheat pasta and brown rice instead of white grains and rice.    Get adequate Calcium and Vitamin D.     Lifestyle    Exercise at least 150 minutes a week (30 minutes a day, 5 days a week). This will help you control your weight and prevent disease.    Limit alcohol to one drink per day.    No smoking.     Wear sunscreen to prevent skin cancer.     See your dentist twice a year for an exam and cleaning.    See your eye doctor every 1 to 2 years to screen for conditions such as glaucoma, macular degeneration and cataracts.    Personalized Prevention Plan  You are due for the preventive services outlined below.  Your care team is available to assist you in scheduling these services.  If you have already completed any of these items, please share that information with your care team to update in your medical record.  Health Maintenance   Topic Date Due     ZOSTER IMMUNIZATION (1 of 2) Never done     DTAP/TDAP/TD IMMUNIZATION (2 - Td or Tdap) 02/19/2019     Pneumococcal Vaccine: 65+ Years (1 of 1 - PPSV23) Never done     COVID-19 Vaccine (3 - Booster for Pfizer series) 11/17/2021     MAMMO SCREENING  12/23/2022     MEDICARE ANNUAL WELLNESS VISIT  03/30/2023     FALL RISK ASSESSMENT  03/30/2023     LIPID  12/15/2025     ADVANCE CARE PLANNING  03/30/2027     COLORECTAL CANCER SCREENING  08/10/2028     DEXA  07/30/2036     PHQ-2 (once per calendar year)  Completed     HEPATITIS C SCREENING  Addressed     HIV SCREENING  Addressed     Pneumococcal Vaccine: Pediatrics (0 to 5 Years) and At-Risk Patients (6 to 64 Years)  Aged Out     IPV  IMMUNIZATION  Aged Out     MENINGITIS IMMUNIZATION  Aged Out     HEPATITIS B IMMUNIZATION  Aged Out     INFLUENZA VACCINE  Discontinued

## 2022-03-30 NOTE — NURSING NOTE
"Chief Complaint   Patient presents with     Medicare Visit     annual       Initial BP (!) 176/106   Pulse 67   Temp 98.2  F (36.8  C) (Temporal)   Resp 16   Ht 1.664 m (5' 5.5\")   Wt 71.2 kg (157 lb)   LMP  (LMP Unknown)   SpO2 97%   Breastfeeding No   BMI 25.73 kg/m   Estimated body mass index is 25.73 kg/m  as calculated from the following:    Height as of this encounter: 1.664 m (5' 5.5\").    Weight as of this encounter: 71.2 kg (157 lb).  Medication Reconciliation: complete    FOOD SECURITY SCREENING QUESTIONS  Hunger Vital Signs:  Within the past 12 months we worried whether our food would run out before we got money to buy more. Never  Within the past 12 months the food we bought just didn't last and we didn't have money to get more. Never        Advance care directive on file? yes      India Chowdhury LPN  "

## 2022-03-31 RX ORDER — CYANOCOBALAMIN (VITAMIN B-12) 2500 MCG
2500 TABLET, SUBLINGUAL SUBLINGUAL DAILY
Qty: 100 TABLET | Refills: 4 | Status: SHIPPED | OUTPATIENT
Start: 2022-03-31 | End: 2022-05-27

## 2022-03-31 RX ORDER — CHOLECALCIFEROL (VITAMIN D3) 50 MCG
2 TABLET ORAL DAILY
Qty: 180 TABLET | Refills: 4 | Status: ON HOLD | OUTPATIENT
Start: 2022-03-31 | End: 2022-12-17

## 2022-05-17 ENCOUNTER — ALLIED HEALTH/NURSE VISIT (OUTPATIENT)
Dept: FAMILY MEDICINE | Facility: OTHER | Age: 67
End: 2022-05-17
Attending: PHYSICIAN ASSISTANT
Payer: COMMERCIAL

## 2022-05-17 DIAGNOSIS — R52 GENERALIZED BODY ACHES: Primary | ICD-10-CM

## 2022-05-17 PROCEDURE — U0005 INFEC AGEN DETEC AMPLI PROBE: HCPCS | Mod: ZL

## 2022-05-17 PROCEDURE — C9803 HOPD COVID-19 SPEC COLLECT: HCPCS

## 2022-05-18 ENCOUNTER — TELEPHONE (OUTPATIENT)
Dept: NURSING | Facility: CLINIC | Age: 67
End: 2022-05-18
Payer: COMMERCIAL

## 2022-05-18 ENCOUNTER — MYC MEDICAL ADVICE (OUTPATIENT)
Dept: INTERNAL MEDICINE | Facility: OTHER | Age: 67
End: 2022-05-18
Payer: COMMERCIAL

## 2022-05-18 LAB — SARS-COV-2 RNA RESP QL NAA+PROBE: POSITIVE

## 2022-05-18 NOTE — TELEPHONE ENCOUNTER
Patient classified as COVID treatment eligible by Epic high risk algorithm:  Yes    Coronavirus (COVID-19) Notification    Reason for call  Notify of POSITIVE COVID-19 lab result, assess symptoms,  review [facility] recommendations    Lab Result   Lab test for 2019-nCoV rRt-PCR or SARS-COV-2 PCR  Oropharyngeal AND/OR nasopharyngeal swabs were POSITIVE for 2019-nCoV RNA [OR] SARS-COV-2 RNA (COVID-19) RNA     We have been unable to reach patient by phone at this time to notify of their Positive COVID-19 result.    Left voicemail message requesting a call back to [phone] [facility] for results.        A Positive COVID-19 letter will be sent via [employer] or the mail. (Exception, no letters sent to Presurgerical/Preprocedure Patients)    Carly Espinal

## 2022-05-26 ASSESSMENT — ANXIETY QUESTIONNAIRES
5. BEING SO RESTLESS THAT IT IS HARD TO SIT STILL: NOT AT ALL
3. WORRYING TOO MUCH ABOUT DIFFERENT THINGS: SEVERAL DAYS
8. IF YOU CHECKED OFF ANY PROBLEMS, HOW DIFFICULT HAVE THESE MADE IT FOR YOU TO DO YOUR WORK, TAKE CARE OF THINGS AT HOME, OR GET ALONG WITH OTHER PEOPLE?: NOT DIFFICULT AT ALL
6. BECOMING EASILY ANNOYED OR IRRITABLE: NOT AT ALL
7. FEELING AFRAID AS IF SOMETHING AWFUL MIGHT HAPPEN: NOT AT ALL
GAD7 TOTAL SCORE: 3
GAD7 TOTAL SCORE: 3
7. FEELING AFRAID AS IF SOMETHING AWFUL MIGHT HAPPEN: NOT AT ALL
4. TROUBLE RELAXING: NOT AT ALL
1. FEELING NERVOUS, ANXIOUS, OR ON EDGE: SEVERAL DAYS
2. NOT BEING ABLE TO STOP OR CONTROL WORRYING: SEVERAL DAYS
GAD7 TOTAL SCORE: 3

## 2022-05-26 ASSESSMENT — PATIENT HEALTH QUESTIONNAIRE - PHQ9
SUM OF ALL RESPONSES TO PHQ QUESTIONS 1-9: 0
10. IF YOU CHECKED OFF ANY PROBLEMS, HOW DIFFICULT HAVE THESE PROBLEMS MADE IT FOR YOU TO DO YOUR WORK, TAKE CARE OF THINGS AT HOME, OR GET ALONG WITH OTHER PEOPLE: NOT DIFFICULT AT ALL
SUM OF ALL RESPONSES TO PHQ QUESTIONS 1-9: 0

## 2022-05-27 ENCOUNTER — MYC MEDICAL ADVICE (OUTPATIENT)
Dept: INTERNAL MEDICINE | Facility: OTHER | Age: 67
End: 2022-05-27

## 2022-05-27 ENCOUNTER — OFFICE VISIT (OUTPATIENT)
Dept: INTERNAL MEDICINE | Facility: OTHER | Age: 67
End: 2022-05-27
Attending: INTERNAL MEDICINE
Payer: COMMERCIAL

## 2022-05-27 VITALS
BODY MASS INDEX: 24.23 KG/M2 | RESPIRATION RATE: 18 BRPM | HEIGHT: 66 IN | WEIGHT: 150.8 LBS | TEMPERATURE: 97.7 F | OXYGEN SATURATION: 95 % | DIASTOLIC BLOOD PRESSURE: 84 MMHG | HEART RATE: 66 BPM | SYSTOLIC BLOOD PRESSURE: 136 MMHG

## 2022-05-27 DIAGNOSIS — E55.9 VITAMIN D DEFICIENCY: ICD-10-CM

## 2022-05-27 DIAGNOSIS — I10 BENIGN ESSENTIAL HYPERTENSION: Primary | ICD-10-CM

## 2022-05-27 DIAGNOSIS — E53.8 VITAMIN B12 DEFICIENCY: ICD-10-CM

## 2022-05-27 DIAGNOSIS — E78.49 FAMILIAL HYPERLIPIDEMIA: ICD-10-CM

## 2022-05-27 DIAGNOSIS — U07.1 INFECTION DUE TO 2019 NOVEL CORONAVIRUS: ICD-10-CM

## 2022-05-27 LAB
CHOLEST SERPL-MCNC: 155 MG/DL
DEPRECATED CALCIDIOL+CALCIFEROL SERPL-MC: 37 UG/L (ref 30–100)
FASTING STATUS PATIENT QL REPORTED: YES
HDLC SERPL-MCNC: 46 MG/DL (ref 23–92)
LDLC SERPL CALC-MCNC: 85 MG/DL
NONHDLC SERPL-MCNC: 109 MG/DL
TRIGL SERPL-MCNC: 119 MG/DL
VIT B12 SERPL-MCNC: >1500 PG/ML (ref 180–914)

## 2022-05-27 PROCEDURE — 36415 COLL VENOUS BLD VENIPUNCTURE: CPT | Mod: ZL | Performed by: INTERNAL MEDICINE

## 2022-05-27 PROCEDURE — 82607 VITAMIN B-12: CPT | Mod: ZL | Performed by: INTERNAL MEDICINE

## 2022-05-27 PROCEDURE — 99214 OFFICE O/P EST MOD 30 MIN: CPT | Performed by: INTERNAL MEDICINE

## 2022-05-27 PROCEDURE — 82306 VITAMIN D 25 HYDROXY: CPT | Mod: ZL | Performed by: INTERNAL MEDICINE

## 2022-05-27 PROCEDURE — G0463 HOSPITAL OUTPT CLINIC VISIT: HCPCS

## 2022-05-27 PROCEDURE — 80061 LIPID PANEL: CPT | Mod: ZL | Performed by: INTERNAL MEDICINE

## 2022-05-27 RX ORDER — CYANOCOBALAMIN (VITAMIN B-12) 2500 MCG
2500 TABLET, SUBLINGUAL SUBLINGUAL DAILY
Qty: 100 TABLET | Refills: 4 | Status: SHIPPED | OUTPATIENT
Start: 2022-05-27 | End: 2022-05-28

## 2022-05-27 RX ORDER — LISINOPRIL 20 MG/1
TABLET ORAL
Qty: 180 TABLET | Refills: 4 | Status: SHIPPED | OUTPATIENT
Start: 2022-05-27 | End: 2023-03-30

## 2022-05-27 RX ORDER — CARVEDILOL 6.25 MG/1
3.12 TABLET ORAL 2 TIMES DAILY WITH MEALS
Qty: 90 TABLET | Refills: 4 | Status: SHIPPED | OUTPATIENT
Start: 2022-05-27 | End: 2023-03-30

## 2022-05-27 ASSESSMENT — ENCOUNTER SYMPTOMS
CHEST TIGHTNESS: 0
CONFUSION: 0
ABDOMINAL PAIN: 0
BACK PAIN: 0
BRUISES/BLEEDS EASILY: 0
SHORTNESS OF BREATH: 0
NERVOUS/ANXIOUS: 1
SINUS PRESSURE: 1
HEMATURIA: 0
FEVER: 0
CHILLS: 0
FATIGUE: 1
WOUND: 0
COUGH: 1
ADENOPATHY: 0
HEADACHES: 0

## 2022-05-27 ASSESSMENT — PATIENT HEALTH QUESTIONNAIRE - PHQ9
10. IF YOU CHECKED OFF ANY PROBLEMS, HOW DIFFICULT HAVE THESE PROBLEMS MADE IT FOR YOU TO DO YOUR WORK, TAKE CARE OF THINGS AT HOME, OR GET ALONG WITH OTHER PEOPLE: NOT DIFFICULT AT ALL
SUM OF ALL RESPONSES TO PHQ QUESTIONS 1-9: 0

## 2022-05-27 ASSESSMENT — ANXIETY QUESTIONNAIRES: GAD7 TOTAL SCORE: 3

## 2022-05-27 ASSESSMENT — PAIN SCALES - GENERAL: PAINLEVEL: NO PAIN (0)

## 2022-05-27 NOTE — PROGRESS NOTES
Assessment & Plan       ICD-10-CM    1. Benign essential hypertension  I10 lisinopril (ZESTRIL) 20 MG tablet     carvedilol (COREG) 6.25 MG tablet   2. Familial hyperlipidemia  E78.49 Lipid Panel     Lipid Panel   3. Vitamin B12 deficiency  E53.8 vitamin B-12 (CYANOCOBALAMIN) 2500 MCG sublingual tablet     Vitamin B12     Vitamin B12   4. Vitamin D deficiency  E55.9 Vitamin D Total     Vitamin D Total   5. Infection due to 2019 novel coronavirus - 5/17/2022  U07.1    Patient presents for follow-up multiple issues.    Hypertension, previously with uncontrolled hypertension.  Currently taking 30 mg of lisinopril and 3.125 mg of twice daily carvedilol.  Current dosing has been very effective.  Her blood pressures at home now have been running mostly in the 100 teens up into the 120s and low 130s, with very rare 140 systolic.  Heart rates have been controlled.  She feels much better.  Needs refills.    Familial hyperlipidemia.  Last cholesterol levels were very high.  Now on Crestor 20 mg daily.  Seems to be tolerating well without side effects.  Labs today now show all of her cholesterol panel is at goal.  She was encouraged to continue with Crestor 20 mg daily.    Vitamin B12 deficiency.  Taking sublingual B12.  Needs refills.  Check labs.    Vitamin D deficiency.  Taking oral replacement.  Check labs.    COVID-19 infection, just recently diagnosed 10 days ago.  Having some fatigue but otherwise doing reasonably well.  No changes for now.    Encouraged weight loss and regular exercise.     Return in about 10 months (around 3/30/2023) for Annual Medicare Wellness Visit.    Chris Fitch MD  [facility] [place]   Eloisa is a 66 year old who presents for the following health issues Recheck Blood pressure medication    History of Present Illness       Hypertension: She presents for follow up of hypertension.  She does check blood pressure  regularly outside of the clinic. Outside blood  "pressures have been over 140/90. She follows a low salt diet.     Symptom onset:  More than a month  Symptom intensity:  Mild  Symptom progression:  Improving  Had these symptoms before:  Yes  What makes it better:  Medication     Today's PHQ-9         PHQ-9 Total Score: 0    PHQ-9 Q9 Thoughts of better off dead/self-harm past 2 weeks :   Not at all    How difficult have these problems made it for you to do your work, take care of things at home, or get along with other people: Not difficult at all  Today's LAKIA-7 Score: 3        Review of Systems   Constitutional: Positive for fatigue (since covid-19 infection 5/17/2022). Negative for chills and fever.   HENT: Positive for congestion, hearing loss (-- low tones) and sinus pressure.    Eyes: Negative for visual disturbance.   Respiratory: Positive for cough (since covid-19 infection 5/17/2022). Negative for chest tightness and shortness of breath.    Cardiovascular: Negative for chest pain.   Gastrointestinal: Negative for abdominal pain.   Endocrine:        + osteoporosis   Genitourinary: Negative for hematuria.        Hx urterine cancer s/p radiation --> lymphedema   Musculoskeletal: Negative for back pain.   Skin: Negative for rash and wound.   Neurological: Negative for syncope and headaches.        Hx of Post-herpetic neuralgia - left flank   Hematological: Negative for adenopathy. Does not bruise/bleed easily.   Psychiatric/Behavioral: Negative for confusion. The patient is nervous/anxious.             Objective    /84 (BP Location: Right arm, Patient Position: Sitting, Cuff Size: Adult Regular)   Pulse 66   Temp 97.7  F (36.5  C) (Temporal)   Resp 18   Ht 1.683 m (5' 6.25\")   Wt 68.4 kg (150 lb 12.8 oz)   LMP  (LMP Unknown)   SpO2 95%   Breastfeeding No   BMI 24.16 kg/m    Body mass index is 24.16 kg/m .  Physical Exam  Constitutional:       General: She is not in acute distress.     Appearance: She is well-developed. She is not diaphoretic. "   HENT:      Head: Normocephalic and atraumatic.   Eyes:      General: No scleral icterus.     Conjunctiva/sclera: Conjunctivae normal.   Neck:      Vascular: No carotid bruit.   Cardiovascular:      Rate and Rhythm: Normal rate and regular rhythm.      Pulses: Normal pulses.   Pulmonary:      Effort: Pulmonary effort is normal.      Breath sounds: Normal breath sounds.   Abdominal:      Palpations: Abdomen is soft.      Tenderness: There is no abdominal tenderness.   Musculoskeletal:         General: No deformity.      Cervical back: Neck supple.      Right lower le+ Pitting Edema present.      Left lower le+ Pitting Edema present.      Comments: + lymphedema bilateral legs + pitting edema   Lymphadenopathy:      Cervical: No cervical adenopathy.   Skin:     General: Skin is warm and dry.      Findings: No rash.   Neurological:      Mental Status: She is alert and oriented to person, place, and time. Mental status is at baseline.   Psychiatric:         Mood and Affect: Mood normal.         Behavior: Behavior normal.          Results for orders placed or performed in visit on 22   Lipid Panel     Status: None   Result Value Ref Range    Cholesterol 155 <200 mg/dL    Triglycerides 119 <150 mg/dL    Direct Measure HDL 46 23 - 92 mg/dL    LDL Cholesterol Calculated 85 <=100 mg/dL    Non HDL Cholesterol 109 <130 mg/dL    Patient Fasting > 8hrs? Yes     Narrative    Cholesterol  Desirable:  <200 mg/dL    Triglycerides  Normal:  Less than 150 mg/dL  Borderline High:  150-199 mg/dL  High:  200-499 mg/dL  Very High:  Greater than or equal to 500 mg/dL    Direct Measure HDL  Female:  Greater than or equal to 50 mg/dL   Male:  Greater than or equal to 40 mg/dL    LDL Cholesterol  Desirable:  <100mg/dL  Above Desirable:  100-129 mg/dL   Borderline High:  130-159 mg/dL   High:  160-189 mg/dL   Very High:  >= 190 mg/dL    Non HDL Cholesterol  Desirable:  130 mg/dL  Above Desirable:  130-159 mg/dL  Borderline High:   160-189 mg/dL  High:  190-219 mg/dL  Very High:  Greater than or equal to 220 mg/dL      Lipid panel is all at goal.

## 2022-05-27 NOTE — PATIENT INSTRUCTIONS
Immunization History   Administered Date(s) Administered    COVID-19,PF,Pfizer (12+ Yrs) 05/27/2021, 06/17/2021    TDAP Vaccine (Boostrix) 02/19/2009      -- Consider getting the 3rd COVID-19 Vaccination series shot at your convenience ( As soon as End of June 2022).    -- Consider the **Pfizer or Moderna** vaccine --      -- Consider Annual Flu / Influenza vaccination - Every Fall (Starting about October 1st)    -- Pneumonia / Pneumococcal PCV 23 vaccine -- anytime x1 after 65. (Changing, could be PCV 20).       -- Get your tetanus shot updated - from one of the local pharmacies, at your convenience or the Local Public Health Department.     -- Get the new shingles shot (2 in series) (Shingrix) - from one of the local pharmacies, at your convenience. -- Check cost/coverage.   -- or -- If these are very expensive, go to the Local Public Health Department     [facility] -- [place] [address]    ---- Shot clinic is the FIRST Thursday of Each Month -- from 2 to 6 PM, by Appointment only.     Call for an appointment -- [phone]        Blood pressure is now well controlled.     Medications refilled.     Labs are pending.     Return in approximately 3/30/2022, or sooner as needed for follow-up with Dr. Fitch.  - Annual Follow-up / Physical - Medicare Annual Wellness Visit     Clinic : [phone]  Appointment line: [phone]

## 2022-05-27 NOTE — NURSING NOTE
"Chief Complaint   Patient presents with     Recheck Medication     Blood pressure         Initial BP (!) 136/90 (BP Location: Right arm, Patient Position: Sitting, Cuff Size: Adult Regular)   Pulse 66   Temp 97.7  F (36.5  C) (Temporal)   Resp 18   Ht 1.683 m (5' 6.25\")   Wt 68.4 kg (150 lb 12.8 oz)   LMP  (LMP Unknown)   SpO2 95%   Breastfeeding No   BMI 24.16 kg/m   Estimated body mass index is 24.16 kg/m  as calculated from the following:    Height as of this encounter: 1.683 m (5' 6.25\").    Weight as of this encounter: 68.4 kg (150 lb 12.8 oz).       FOOD SECURITY SCREENING QUESTIONS:    The next two questions are to help us understand your food security.  If you are feeling you need any assistance in this area, we have resources available to support you today.    Hunger Vital Signs:  Within the past 12 months we worried whether our food would run out before we got money to buy more. Never  Within the past 12 months the food we bought just didn't last and we didn't have money to get more. Never    Advance Care Directive on file? no      Medication reconciliation complete.      David Reyna,on 5/27/2022 at 8:47 AM        "

## 2022-05-28 RX ORDER — CYANOCOBALAMIN (VITAMIN B-12) 2500 MCG
2500 TABLET, SUBLINGUAL SUBLINGUAL
Qty: 100 TABLET | Refills: 4 | Status: SHIPPED | OUTPATIENT
Start: 2022-05-30 | End: 2023-03-30

## 2022-06-03 ENCOUNTER — MYC MEDICAL ADVICE (OUTPATIENT)
Dept: INTERNAL MEDICINE | Facility: OTHER | Age: 67
End: 2022-06-03
Payer: COMMERCIAL

## 2022-06-03 NOTE — TELEPHONE ENCOUNTER
I did advise her to continue ibuprofen and pushing fluids.  If he does statrt to have more sx again should check another COVID test or posible take to [facility].  She does state understanding.  David Reyna .......  6/3/2022  2:45 PM

## 2022-12-16 ENCOUNTER — ANESTHESIA (OUTPATIENT)
Dept: SURGERY | Facility: OTHER | Age: 67
DRG: 337 | End: 2022-12-16
Payer: COMMERCIAL

## 2022-12-16 ENCOUNTER — ANESTHESIA EVENT (OUTPATIENT)
Dept: SURGERY | Facility: OTHER | Age: 67
DRG: 337 | End: 2022-12-16
Payer: COMMERCIAL

## 2022-12-16 ENCOUNTER — HOSPITAL ENCOUNTER (INPATIENT)
Facility: OTHER | Age: 67
LOS: 3 days | Discharge: HOME | DRG: 337 | End: 2022-12-19
Attending: PHYSICIAN ASSISTANT | Admitting: SURGERY
Payer: COMMERCIAL

## 2022-12-16 ENCOUNTER — APPOINTMENT (OUTPATIENT)
Dept: CT IMAGING | Facility: OTHER | Age: 67
DRG: 337 | End: 2022-12-16
Attending: PHYSICIAN ASSISTANT
Payer: COMMERCIAL

## 2022-12-16 DIAGNOSIS — K56.52 INTESTINAL ADHESIONS WITH COMPLETE OBSTRUCTION (H): Primary | ICD-10-CM

## 2022-12-16 DIAGNOSIS — K56.609 SMALL BOWEL OBSTRUCTION (H): ICD-10-CM

## 2022-12-16 DIAGNOSIS — K56.609 INTESTINAL OBSTRUCTION, UNSPECIFIED CAUSE, UNSPECIFIED WHETHER PARTIAL OR COMPLETE (H): ICD-10-CM

## 2022-12-16 DIAGNOSIS — K56.609 BOWEL OBSTRUCTION (H): ICD-10-CM

## 2022-12-16 LAB
ALBUMIN SERPL BCG-MCNC: 4.4 G/DL (ref 3.5–5.2)
ALBUMIN UR-MCNC: 70 MG/DL
ALP SERPL-CCNC: 59 U/L (ref 35–104)
ALT SERPL W P-5'-P-CCNC: 19 U/L (ref 10–35)
ANION GAP SERPL CALCULATED.3IONS-SCNC: 12 MMOL/L (ref 7–15)
APPEARANCE UR: CLEAR
AST SERPL W P-5'-P-CCNC: 24 U/L (ref 10–35)
BASOPHILS # BLD AUTO: 0 10E3/UL (ref 0–0.2)
BASOPHILS NFR BLD AUTO: 0 %
BILIRUB SERPL-MCNC: 0.5 MG/DL
BILIRUB UR QL STRIP: NEGATIVE
BUN SERPL-MCNC: 14.8 MG/DL (ref 8–23)
CALCIUM SERPL-MCNC: 9.8 MG/DL (ref 8.8–10.2)
CHLORIDE SERPL-SCNC: 98 MMOL/L (ref 98–107)
COLOR UR AUTO: YELLOW
CREAT SERPL-MCNC: 0.71 MG/DL (ref 0.51–0.95)
DEPRECATED HCO3 PLAS-SCNC: 25 MMOL/L (ref 22–29)
EOSINOPHIL # BLD AUTO: 0 10E3/UL (ref 0–0.7)
EOSINOPHIL NFR BLD AUTO: 0 %
ERYTHROCYTE [DISTWIDTH] IN BLOOD BY AUTOMATED COUNT: 13 % (ref 10–15)
GFR SERPL CREATININE-BSD FRML MDRD: >90 ML/MIN/1.73M2
GLUCOSE SERPL-MCNC: 142 MG/DL (ref 70–99)
GLUCOSE UR STRIP-MCNC: 100 MG/DL
HCT VFR BLD AUTO: 41.7 % (ref 35–47)
HGB BLD-MCNC: 14.1 G/DL (ref 11.7–15.7)
HGB UR QL STRIP: ABNORMAL
HOLD SPECIMEN: NORMAL
HOLD SPECIMEN: NORMAL
IMM GRANULOCYTES # BLD: 0 10E3/UL
IMM GRANULOCYTES NFR BLD: 0 %
KETONES UR STRIP-MCNC: 20 MG/DL
LACTATE SERPL-SCNC: 1.1 MMOL/L (ref 0.7–2)
LEUKOCYTE ESTERASE UR QL STRIP: NEGATIVE
LIPASE SERPL-CCNC: 13 U/L (ref 13–60)
LYMPHOCYTES # BLD AUTO: 1.9 10E3/UL (ref 0.8–5.3)
LYMPHOCYTES NFR BLD AUTO: 18 %
MCH RBC QN AUTO: 29.9 PG (ref 26.5–33)
MCHC RBC AUTO-ENTMCNC: 33.8 G/DL (ref 31.5–36.5)
MCV RBC AUTO: 89 FL (ref 78–100)
MONOCYTES # BLD AUTO: 0.3 10E3/UL (ref 0–1.3)
MONOCYTES NFR BLD AUTO: 3 %
MUCOUS THREADS #/AREA URNS LPF: PRESENT /LPF
NEUTROPHILS # BLD AUTO: 7.9 10E3/UL (ref 1.6–8.3)
NEUTROPHILS NFR BLD AUTO: 79 %
NITRATE UR QL: NEGATIVE
NRBC # BLD AUTO: 0 10E3/UL
NRBC BLD AUTO-RTO: 0 /100
PH UR STRIP: 8 [PH] (ref 5–9)
PLATELET # BLD AUTO: 231 10E3/UL (ref 150–450)
POTASSIUM SERPL-SCNC: 4.5 MMOL/L (ref 3.4–5.3)
PROT SERPL-MCNC: 7.4 G/DL (ref 6.4–8.3)
RBC # BLD AUTO: 4.71 10E6/UL (ref 3.8–5.2)
RBC URINE: 6 /HPF
SODIUM SERPL-SCNC: 135 MMOL/L (ref 136–145)
SP GR UR STRIP: 1.01 (ref 1–1.03)
TROPONIN T SERPL HS-MCNC: 9 NG/L
UROBILINOGEN UR STRIP-MCNC: NORMAL MG/DL
WBC # BLD AUTO: 10.1 10E3/UL (ref 4–11)
WBC URINE: 1 /HPF

## 2022-12-16 PROCEDURE — 74177 CT ABD & PELVIS W/CONTRAST: CPT

## 2022-12-16 PROCEDURE — 250N000011 HC RX IP 250 OP 636: Performed by: SURGERY

## 2022-12-16 PROCEDURE — 99223 1ST HOSP IP/OBS HIGH 75: CPT | Mod: 25 | Performed by: SURGERY

## 2022-12-16 PROCEDURE — 0DNN0ZZ RELEASE SIGMOID COLON, OPEN APPROACH: ICD-10-PCS | Performed by: SURGERY

## 2022-12-16 PROCEDURE — 99285 EMERGENCY DEPT VISIT HI MDM: CPT | Mod: 25 | Performed by: PHYSICIAN ASSISTANT

## 2022-12-16 PROCEDURE — 85025 COMPLETE CBC W/AUTO DIFF WBC: CPT | Performed by: PHYSICIAN ASSISTANT

## 2022-12-16 PROCEDURE — 258N000003 HC RX IP 258 OP 636: Performed by: NURSE ANESTHETIST, CERTIFIED REGISTERED

## 2022-12-16 PROCEDURE — 258N000003 HC RX IP 258 OP 636: Performed by: SURGERY

## 2022-12-16 PROCEDURE — 93010 ELECTROCARDIOGRAM REPORT: CPT | Performed by: INTERNAL MEDICINE

## 2022-12-16 PROCEDURE — 250N000009 HC RX 250: Performed by: SURGERY

## 2022-12-16 PROCEDURE — 96375 TX/PRO/DX INJ NEW DRUG ADDON: CPT | Performed by: PHYSICIAN ASSISTANT

## 2022-12-16 PROCEDURE — 44005 FREEING OF BOWEL ADHESION: CPT | Performed by: NURSE ANESTHETIST, CERTIFIED REGISTERED

## 2022-12-16 PROCEDURE — 250N000025 HC SEVOFLURANE, PER MIN: Performed by: SURGERY

## 2022-12-16 PROCEDURE — 44005 FREEING OF BOWEL ADHESION: CPT | Performed by: SURGERY

## 2022-12-16 PROCEDURE — 36415 COLL VENOUS BLD VENIPUNCTURE: CPT | Performed by: PHYSICIAN ASSISTANT

## 2022-12-16 PROCEDURE — 250N000011 HC RX IP 250 OP 636: Performed by: PHYSICIAN ASSISTANT

## 2022-12-16 PROCEDURE — 360N000076 HC SURGERY LEVEL 3, PER MIN: Performed by: SURGERY

## 2022-12-16 PROCEDURE — 370N000017 HC ANESTHESIA TECHNICAL FEE, PER MIN: Performed by: SURGERY

## 2022-12-16 PROCEDURE — 93005 ELECTROCARDIOGRAM TRACING: CPT | Performed by: PHYSICIAN ASSISTANT

## 2022-12-16 PROCEDURE — 250N000009 HC RX 250: Performed by: NURSE ANESTHETIST, CERTIFIED REGISTERED

## 2022-12-16 PROCEDURE — 96374 THER/PROPH/DIAG INJ IV PUSH: CPT | Mod: XU | Performed by: PHYSICIAN ASSISTANT

## 2022-12-16 PROCEDURE — 710N000010 HC RECOVERY PHASE 1, LEVEL 2, PER MIN: Performed by: SURGERY

## 2022-12-16 PROCEDURE — 120N000001 HC R&B MED SURG/OB

## 2022-12-16 PROCEDURE — 83690 ASSAY OF LIPASE: CPT | Performed by: PHYSICIAN ASSISTANT

## 2022-12-16 PROCEDURE — 272N000001 HC OR GENERAL SUPPLY STERILE: Performed by: SURGERY

## 2022-12-16 PROCEDURE — 250N000011 HC RX IP 250 OP 636: Performed by: NURSE ANESTHETIST, CERTIFIED REGISTERED

## 2022-12-16 PROCEDURE — 96376 TX/PRO/DX INJ SAME DRUG ADON: CPT | Performed by: PHYSICIAN ASSISTANT

## 2022-12-16 PROCEDURE — 84484 ASSAY OF TROPONIN QUANT: CPT | Performed by: PHYSICIAN ASSISTANT

## 2022-12-16 PROCEDURE — 80053 COMPREHEN METABOLIC PANEL: CPT | Performed by: PHYSICIAN ASSISTANT

## 2022-12-16 PROCEDURE — 99285 EMERGENCY DEPT VISIT HI MDM: CPT | Performed by: PHYSICIAN ASSISTANT

## 2022-12-16 PROCEDURE — 99140 ANES COMP EMERGENCY COND: CPT | Performed by: NURSE ANESTHETIST, CERTIFIED REGISTERED

## 2022-12-16 PROCEDURE — 83605 ASSAY OF LACTIC ACID: CPT | Performed by: PHYSICIAN ASSISTANT

## 2022-12-16 PROCEDURE — 81001 URINALYSIS AUTO W/SCOPE: CPT | Performed by: FAMILY MEDICINE

## 2022-12-16 PROCEDURE — 250N000013 HC RX MED GY IP 250 OP 250 PS 637: Performed by: SURGERY

## 2022-12-16 PROCEDURE — 258N000003 HC RX IP 258 OP 636: Performed by: PHYSICIAN ASSISTANT

## 2022-12-16 RX ORDER — ONDANSETRON 2 MG/ML
4 INJECTION INTRAMUSCULAR; INTRAVENOUS ONCE
Status: COMPLETED | OUTPATIENT
Start: 2022-12-16 | End: 2022-12-16

## 2022-12-16 RX ORDER — CEFAZOLIN SODIUM 2 G/100ML
2 INJECTION, SOLUTION INTRAVENOUS SEE ADMIN INSTRUCTIONS
Status: DISCONTINUED | OUTPATIENT
Start: 2022-12-16 | End: 2022-12-16

## 2022-12-16 RX ORDER — SODIUM CHLORIDE 9 MG/ML
INJECTION, SOLUTION INTRAVENOUS CONTINUOUS PRN
Status: DISCONTINUED | OUTPATIENT
Start: 2022-12-16 | End: 2022-12-16

## 2022-12-16 RX ORDER — KETOROLAC TROMETHAMINE 30 MG/ML
INJECTION, SOLUTION INTRAMUSCULAR; INTRAVENOUS PRN
Status: DISCONTINUED | OUTPATIENT
Start: 2022-12-16 | End: 2022-12-16

## 2022-12-16 RX ORDER — FENTANYL CITRATE 50 UG/ML
50 INJECTION, SOLUTION INTRAMUSCULAR; INTRAVENOUS EVERY 5 MIN PRN
Status: DISCONTINUED | OUTPATIENT
Start: 2022-12-16 | End: 2022-12-16

## 2022-12-16 RX ORDER — ENOXAPARIN SODIUM 100 MG/ML
40 INJECTION SUBCUTANEOUS EVERY 24 HOURS
Status: DISCONTINUED | OUTPATIENT
Start: 2022-12-17 | End: 2022-12-19 | Stop reason: HOSPADM

## 2022-12-16 RX ORDER — ACETAMINOPHEN 325 MG/1
975 TABLET ORAL ONCE
Status: DISCONTINUED | OUTPATIENT
Start: 2022-12-16 | End: 2022-12-16

## 2022-12-16 RX ORDER — ONDANSETRON 2 MG/ML
4 INJECTION INTRAMUSCULAR; INTRAVENOUS EVERY 6 HOURS PRN
Status: DISCONTINUED | OUTPATIENT
Start: 2022-12-16 | End: 2022-12-19 | Stop reason: HOSPADM

## 2022-12-16 RX ORDER — MAGNESIUM HYDROXIDE 1200 MG/15ML
LIQUID ORAL PRN
Status: DISCONTINUED | OUTPATIENT
Start: 2022-12-16 | End: 2022-12-16 | Stop reason: HOSPADM

## 2022-12-16 RX ORDER — NALOXONE HYDROCHLORIDE 0.4 MG/ML
0.2 INJECTION, SOLUTION INTRAMUSCULAR; INTRAVENOUS; SUBCUTANEOUS
Status: DISCONTINUED | OUTPATIENT
Start: 2022-12-16 | End: 2022-12-19 | Stop reason: HOSPADM

## 2022-12-16 RX ORDER — SODIUM CHLORIDE, SODIUM LACTATE, POTASSIUM CHLORIDE, CALCIUM CHLORIDE 600; 310; 30; 20 MG/100ML; MG/100ML; MG/100ML; MG/100ML
INJECTION, SOLUTION INTRAVENOUS CONTINUOUS PRN
Status: DISCONTINUED | OUTPATIENT
Start: 2022-12-16 | End: 2022-12-16

## 2022-12-16 RX ORDER — IOPAMIDOL 755 MG/ML
86 INJECTION, SOLUTION INTRAVASCULAR ONCE
Status: COMPLETED | OUTPATIENT
Start: 2022-12-16 | End: 2022-12-16

## 2022-12-16 RX ORDER — LIDOCAINE 40 MG/G
CREAM TOPICAL
Status: DISCONTINUED | OUTPATIENT
Start: 2022-12-16 | End: 2022-12-19 | Stop reason: HOSPADM

## 2022-12-16 RX ORDER — FENTANYL CITRATE 50 UG/ML
INJECTION, SOLUTION INTRAMUSCULAR; INTRAVENOUS PRN
Status: DISCONTINUED | OUTPATIENT
Start: 2022-12-16 | End: 2022-12-16

## 2022-12-16 RX ORDER — ACETAMINOPHEN 325 MG/1
975 TABLET ORAL EVERY 8 HOURS
Status: DISCONTINUED | OUTPATIENT
Start: 2022-12-16 | End: 2022-12-19 | Stop reason: HOSPADM

## 2022-12-16 RX ORDER — NALOXONE HYDROCHLORIDE 0.4 MG/ML
0.4 INJECTION, SOLUTION INTRAMUSCULAR; INTRAVENOUS; SUBCUTANEOUS
Status: DISCONTINUED | OUTPATIENT
Start: 2022-12-16 | End: 2022-12-19 | Stop reason: HOSPADM

## 2022-12-16 RX ORDER — FENTANYL CITRATE 50 UG/ML
25 INJECTION, SOLUTION INTRAMUSCULAR; INTRAVENOUS EVERY 5 MIN PRN
Status: DISCONTINUED | OUTPATIENT
Start: 2022-12-16 | End: 2022-12-16

## 2022-12-16 RX ORDER — LIDOCAINE HYDROCHLORIDE 20 MG/ML
INJECTION, SOLUTION INFILTRATION; PERINEURAL PRN
Status: DISCONTINUED | OUTPATIENT
Start: 2022-12-16 | End: 2022-12-16

## 2022-12-16 RX ORDER — PROCHLORPERAZINE MALEATE 5 MG
5 TABLET ORAL EVERY 6 HOURS PRN
Status: DISCONTINUED | OUTPATIENT
Start: 2022-12-16 | End: 2022-12-19 | Stop reason: HOSPADM

## 2022-12-16 RX ORDER — ONDANSETRON 4 MG/1
4 TABLET, ORALLY DISINTEGRATING ORAL EVERY 6 HOURS PRN
Status: DISCONTINUED | OUTPATIENT
Start: 2022-12-16 | End: 2022-12-19 | Stop reason: HOSPADM

## 2022-12-16 RX ORDER — SODIUM CHLORIDE, SODIUM LACTATE, POTASSIUM CHLORIDE, CALCIUM CHLORIDE 600; 310; 30; 20 MG/100ML; MG/100ML; MG/100ML; MG/100ML
INJECTION, SOLUTION INTRAVENOUS CONTINUOUS
Status: DISCONTINUED | OUTPATIENT
Start: 2022-12-16 | End: 2022-12-16 | Stop reason: HOSPADM

## 2022-12-16 RX ORDER — DEXAMETHASONE SODIUM PHOSPHATE 4 MG/ML
INJECTION, SOLUTION INTRA-ARTICULAR; INTRALESIONAL; INTRAMUSCULAR; INTRAVENOUS; SOFT TISSUE PRN
Status: DISCONTINUED | OUTPATIENT
Start: 2022-12-16 | End: 2022-12-16

## 2022-12-16 RX ORDER — HYDROMORPHONE HYDROCHLORIDE 1 MG/ML
0.4 INJECTION, SOLUTION INTRAMUSCULAR; INTRAVENOUS; SUBCUTANEOUS EVERY 5 MIN PRN
Status: DISCONTINUED | OUTPATIENT
Start: 2022-12-16 | End: 2022-12-16 | Stop reason: HOSPADM

## 2022-12-16 RX ORDER — CARVEDILOL 3.12 MG/1
3.12 TABLET ORAL 2 TIMES DAILY WITH MEALS
Status: DISCONTINUED | OUTPATIENT
Start: 2022-12-16 | End: 2022-12-19 | Stop reason: HOSPADM

## 2022-12-16 RX ORDER — PROPOFOL 10 MG/ML
INJECTION, EMULSION INTRAVENOUS PRN
Status: DISCONTINUED | OUTPATIENT
Start: 2022-12-16 | End: 2022-12-16

## 2022-12-16 RX ORDER — LABETALOL HYDROCHLORIDE 5 MG/ML
10 INJECTION, SOLUTION INTRAVENOUS
Status: DISCONTINUED | OUTPATIENT
Start: 2022-12-16 | End: 2022-12-16 | Stop reason: HOSPADM

## 2022-12-16 RX ORDER — FENTANYL CITRATE 50 UG/ML
50 INJECTION, SOLUTION INTRAMUSCULAR; INTRAVENOUS EVERY 30 MIN PRN
Status: DISCONTINUED | OUTPATIENT
Start: 2022-12-16 | End: 2022-12-16 | Stop reason: ALTCHOICE

## 2022-12-16 RX ORDER — ONDANSETRON 2 MG/ML
4 INJECTION INTRAMUSCULAR; INTRAVENOUS EVERY 30 MIN PRN
Status: DISCONTINUED | OUTPATIENT
Start: 2022-12-16 | End: 2022-12-16 | Stop reason: HOSPADM

## 2022-12-16 RX ORDER — ESMOLOL HYDROCHLORIDE 10 MG/ML
INJECTION INTRAVENOUS PRN
Status: DISCONTINUED | OUTPATIENT
Start: 2022-12-16 | End: 2022-12-16

## 2022-12-16 RX ORDER — BUPIVACAINE HYDROCHLORIDE 2.5 MG/ML
INJECTION, SOLUTION INFILTRATION; PERINEURAL PRN
Status: DISCONTINUED | OUTPATIENT
Start: 2022-12-16 | End: 2022-12-16 | Stop reason: HOSPADM

## 2022-12-16 RX ORDER — ACETAMINOPHEN 10 MG/ML
INJECTION, SOLUTION INTRAVENOUS PRN
Status: DISCONTINUED | OUTPATIENT
Start: 2022-12-16 | End: 2022-12-16

## 2022-12-16 RX ORDER — ACETAMINOPHEN 325 MG/1
650 TABLET ORAL EVERY 4 HOURS PRN
Status: DISCONTINUED | OUTPATIENT
Start: 2022-12-19 | End: 2022-12-19

## 2022-12-16 RX ORDER — CEFAZOLIN SODIUM 2 G/100ML
INJECTION, SOLUTION INTRAVENOUS PRN
Status: DISCONTINUED | OUTPATIENT
Start: 2022-12-16 | End: 2022-12-16

## 2022-12-16 RX ORDER — DEXTROSE MONOHYDRATE, SODIUM CHLORIDE, AND POTASSIUM CHLORIDE 50; 1.49; 4.5 G/1000ML; G/1000ML; G/1000ML
INJECTION, SOLUTION INTRAVENOUS CONTINUOUS
Status: DISCONTINUED | OUTPATIENT
Start: 2022-12-16 | End: 2022-12-17 | Stop reason: CLARIF

## 2022-12-16 RX ORDER — HYDROMORPHONE HCL IN WATER/PF 6 MG/30 ML
0.4 PATIENT CONTROLLED ANALGESIA SYRINGE INTRAVENOUS
Status: DISCONTINUED | OUTPATIENT
Start: 2022-12-16 | End: 2022-12-17

## 2022-12-16 RX ORDER — CEFAZOLIN SODIUM 2 G/100ML
2 INJECTION, SOLUTION INTRAVENOUS
Status: DISCONTINUED | OUTPATIENT
Start: 2022-12-16 | End: 2022-12-16

## 2022-12-16 RX ORDER — KETOROLAC TROMETHAMINE 15 MG/ML
15 INJECTION, SOLUTION INTRAMUSCULAR; INTRAVENOUS EVERY 6 HOURS
Status: COMPLETED | OUTPATIENT
Start: 2022-12-17 | End: 2022-12-17

## 2022-12-16 RX ORDER — HYDROMORPHONE HYDROCHLORIDE 1 MG/ML
0.2 INJECTION, SOLUTION INTRAMUSCULAR; INTRAVENOUS; SUBCUTANEOUS EVERY 5 MIN PRN
Status: DISCONTINUED | OUTPATIENT
Start: 2022-12-16 | End: 2022-12-16 | Stop reason: HOSPADM

## 2022-12-16 RX ORDER — KETAMINE HYDROCHLORIDE 10 MG/ML
INJECTION INTRAMUSCULAR; INTRAVENOUS PRN
Status: DISCONTINUED | OUTPATIENT
Start: 2022-12-16 | End: 2022-12-16

## 2022-12-16 RX ORDER — FAMOTIDINE 20 MG/1
20 TABLET, FILM COATED ORAL 2 TIMES DAILY
Status: DISCONTINUED | OUTPATIENT
Start: 2022-12-16 | End: 2022-12-19 | Stop reason: HOSPADM

## 2022-12-16 RX ORDER — TRAMADOL HYDROCHLORIDE 50 MG/1
50 TABLET ORAL EVERY 6 HOURS PRN
Status: DISCONTINUED | OUTPATIENT
Start: 2022-12-16 | End: 2022-12-19 | Stop reason: HOSPADM

## 2022-12-16 RX ORDER — HYDROMORPHONE HCL IN WATER/PF 6 MG/30 ML
0.2 PATIENT CONTROLLED ANALGESIA SYRINGE INTRAVENOUS
Status: DISCONTINUED | OUTPATIENT
Start: 2022-12-16 | End: 2022-12-17

## 2022-12-16 RX ORDER — ONDANSETRON 4 MG/1
4 TABLET, ORALLY DISINTEGRATING ORAL EVERY 30 MIN PRN
Status: DISCONTINUED | OUTPATIENT
Start: 2022-12-16 | End: 2022-12-16 | Stop reason: HOSPADM

## 2022-12-16 RX ORDER — PROPOFOL 10 MG/ML
INJECTION, EMULSION INTRAVENOUS CONTINUOUS PRN
Status: DISCONTINUED | OUTPATIENT
Start: 2022-12-16 | End: 2022-12-16

## 2022-12-16 RX ORDER — ONDANSETRON 2 MG/ML
INJECTION INTRAMUSCULAR; INTRAVENOUS PRN
Status: DISCONTINUED | OUTPATIENT
Start: 2022-12-16 | End: 2022-12-16

## 2022-12-16 RX ADMIN — SODIUM CHLORIDE, SODIUM LACTATE, POTASSIUM CHLORIDE, AND CALCIUM CHLORIDE: 600; 310; 30; 20 INJECTION, SOLUTION INTRAVENOUS at 16:57

## 2022-12-16 RX ADMIN — DEXAMETHASONE SODIUM PHOSPHATE 4 MG: 4 INJECTION, SOLUTION INTRA-ARTICULAR; INTRALESIONAL; INTRAMUSCULAR; INTRAVENOUS; SOFT TISSUE at 16:47

## 2022-12-16 RX ADMIN — PROPOFOL 200 MG: 10 INJECTION, EMULSION INTRAVENOUS at 16:47

## 2022-12-16 RX ADMIN — ACETAMINOPHEN 1000 MG: 10 INJECTION, SOLUTION INTRAVENOUS at 16:59

## 2022-12-16 RX ADMIN — ACETAMINOPHEN 975 MG: 325 TABLET, FILM COATED ORAL at 21:05

## 2022-12-16 RX ADMIN — FENTANYL CITRATE 50 MCG: 50 INJECTION, SOLUTION INTRAMUSCULAR; INTRAVENOUS at 16:47

## 2022-12-16 RX ADMIN — CEFAZOLIN SODIUM 2 G: 2 INJECTION, SOLUTION INTRAVENOUS at 16:35

## 2022-12-16 RX ADMIN — LIDOCAINE HYDROCHLORIDE 40 MG: 20 INJECTION, SOLUTION INFILTRATION; PERINEURAL at 16:47

## 2022-12-16 RX ADMIN — ROCURONIUM BROMIDE 40 MG: 50 INJECTION, SOLUTION INTRAVENOUS at 16:47

## 2022-12-16 RX ADMIN — ONDANSETRON 4 MG: 2 INJECTION INTRAMUSCULAR; INTRAVENOUS at 13:31

## 2022-12-16 RX ADMIN — FAMOTIDINE 20 MG: 20 TABLET ORAL at 21:05

## 2022-12-16 RX ADMIN — SODIUM CHLORIDE: 900 INJECTION, SOLUTION INTRAVENOUS at 16:43

## 2022-12-16 RX ADMIN — Medication 10 MG: at 16:52

## 2022-12-16 RX ADMIN — IOPAMIDOL 86 ML: 755 INJECTION, SOLUTION INTRAVENOUS at 15:02

## 2022-12-16 RX ADMIN — FENTANYL CITRATE 50 MCG: 0.05 INJECTION, SOLUTION INTRAMUSCULAR; INTRAVENOUS at 13:32

## 2022-12-16 RX ADMIN — PROPOFOL 100 MCG/KG/MIN: 10 INJECTION, EMULSION INTRAVENOUS at 16:47

## 2022-12-16 RX ADMIN — CARVEDILOL 3.12 MG: 3.12 TABLET, FILM COATED ORAL at 19:01

## 2022-12-16 RX ADMIN — FENTANYL CITRATE 50 MCG: 0.05 INJECTION, SOLUTION INTRAMUSCULAR; INTRAVENOUS at 14:34

## 2022-12-16 RX ADMIN — POTASSIUM CHLORIDE, DEXTROSE MONOHYDRATE AND SODIUM CHLORIDE: 150; 5; 450 INJECTION, SOLUTION INTRAVENOUS at 19:01

## 2022-12-16 RX ADMIN — SODIUM CHLORIDE 1000 ML: 9 INJECTION, SOLUTION INTRAVENOUS at 13:33

## 2022-12-16 RX ADMIN — ONDANSETRON 4 MG: 2 INJECTION INTRAMUSCULAR; INTRAVENOUS at 14:34

## 2022-12-16 RX ADMIN — SUGAMMADEX 200 MG: 100 INJECTION, SOLUTION INTRAVENOUS at 17:37

## 2022-12-16 RX ADMIN — ESMOLOL HYDROCHLORIDE 10 MG: 10 INJECTION, SOLUTION INTRAVENOUS at 17:01

## 2022-12-16 RX ADMIN — KETOROLAC TROMETHAMINE 30 MG: 30 INJECTION, SOLUTION INTRAMUSCULAR at 17:18

## 2022-12-16 RX ADMIN — ONDANSETRON HYDROCHLORIDE 4 MG: 2 SOLUTION INTRAMUSCULAR; INTRAVENOUS at 17:30

## 2022-12-16 RX ADMIN — MIDAZOLAM HYDROCHLORIDE 2 MG: 1 INJECTION, SOLUTION INTRAMUSCULAR; INTRAVENOUS at 16:47

## 2022-12-16 RX ADMIN — FENTANYL CITRATE 50 MCG: 50 INJECTION, SOLUTION INTRAMUSCULAR; INTRAVENOUS at 16:52

## 2022-12-16 ASSESSMENT — ACTIVITIES OF DAILY LIVING (ADL)
ADLS_ACUITY_SCORE: 22
ADLS_ACUITY_SCORE: 35
ADLS_ACUITY_SCORE: 35
ADLS_ACUITY_SCORE: 22
ADLS_ACUITY_SCORE: 20

## 2022-12-16 ASSESSMENT — ENCOUNTER SYMPTOMS
ABDOMINAL PAIN: 1
SHORTNESS OF BREATH: 0
FEVER: 0
NAUSEA: 1
DYSURIA: 0
VOMITING: 1
CONFUSION: 0

## 2022-12-16 NOTE — ED PROVIDER NOTES
"  History     Chief Complaint   Patient presents with     Nausea & Vomiting     Abdominal Pain     HPI  Eloisa Del Rosario is a 67 year old female who presents to the ED for evaluation of n/v, abdominal pain. patient arrives with vomiting and severe right sided lower abd pain. States ca n not remember when last BM was. States throwing up bile. Pt rates pain 10/10 and is restless in traige.     Allergies:  Allergies   Allergen Reactions     Erythromycin Itching, Rash, Shortness Of Breath and Hives     Other [No Clinical Screening - See Comments] Anaphylaxis     Cannot have any \"myacin's\".        Problem List:    Patient Active Problem List    Diagnosis Date Noted     Infection due to 2019 novel coronavirus - 5/17/2022 05/27/2022     Priority: Medium     Vitamin D deficiency 05/27/2022     Priority: Medium     Vitamin B12 deficiency 05/27/2022     Priority: Medium     Oral thrush 01/21/2022     Priority: Medium     Benign essential hypertension 01/22/2021     Priority: Medium     Bilateral lower extremity edema 12/29/2020     Priority: Medium     Familial hyperlipidemia 12/15/2020     Priority: Medium     Hearing difficulty of right ear 12/17/2018     Priority: Medium     Lymphedema of both lower extremities 09/20/2018     Priority: Medium     Personal history of malignant neoplasm of cervix uteri 01/26/2018     Priority: Medium     Overview:   History of cervical cancer with radiotherapy and cesium implants       Chronic anxiety 01/26/2018     Priority: Medium     History of peptic ulcer disease 01/26/2018     Priority: Medium     Overview:   History of positive H. pylori not treated       White coat syndrome with hypertension 01/26/2018     Priority: Medium     Age-related osteoporosis without current pathological fracture 06/20/2017     Priority: Medium     Chronic left SI joint pain 05/23/2017     Priority: Medium     Menopausal state 05/23/2017     Priority: Medium     Sciatica of left side 05/23/2017     " Priority: Medium     Neuralgia, post-herpetic 2015     Priority: Medium     FHx: diabetes mellitus 2014     Priority: Medium     Colonoscopy refused 2014     Priority: Medium     Mammogram declined 2014     Priority: Medium     S/P radiation therapy 2014     Priority: Medium        Past Medical History:    Past Medical History:   Diagnosis Date     Non-Hodgkin lymphoma of extranodal and solid organ sites (H)      Personal history of irradiation        Past Surgical History:    Past Surgical History:   Procedure Laterality Date     COLONOSCOPY           LAPAROTOMY EXPLORATORY      Exploratory lap.retroperitoneal lymph node dissection     SALPINGO-OOPHORECTOMY BILATERAL      Removal of left tube and ovary       Family History:    Family History   Problem Relation Age of Onset     Heart Disease Father         Heart Disease, at 57     Diabetes Mother         Diabetes,type II DM     Other - See Comments Mother Provider         at 89     Diabetes Sister         Diabetes,Type II DM -  at about 73     Thyroid Disease Sister         Thyroid Disease     Heart Disease Brother         Heart Disease, at 56       Social History:  Marital Status:   [5]  Social History     Tobacco Use     Smoking status: Never     Smokeless tobacco: Never   Vaping Use     Vaping Use: Never used   Substance Use Topics     Alcohol use: No     Alcohol/week: 0.0 standard drinks     Drug use: Not Currently        Medications:    acetaminophen (TYLENOL) 650 MG CR tablet  Blood Pressure Monitoring (SELF-TAKING BLOOD PRESSURE) KIT  carvedilol (COREG) 6.25 MG tablet  lisinopril (ZESTRIL) 20 MG tablet  order for DME  rosuvastatin (CRESTOR) 20 MG tablet  vitamin B-12 (CYANOCOBALAMIN) 2500 MCG sublingual tablet  vitamin D3 (CHOLECALCIFEROL) 50 mcg (2000 units) tablet          Review of Systems   Constitutional: Negative for fever.   HENT: Negative for congestion.    Eyes: Negative for visual disturbance.  "  Respiratory: Negative for shortness of breath.    Cardiovascular: Negative for chest pain.   Gastrointestinal: Positive for abdominal pain, nausea and vomiting.   Genitourinary: Negative for dysuria.   Psychiatric/Behavioral: Negative for confusion.       Physical Exam   BP: (!) 233/92  Pulse: 54  Temp: 97  F (36.1  C)  Resp: 20  Height: 167.6 cm (5' 6\")  Weight: 68 kg (150 lb)  SpO2: 99 %      Physical Exam  Constitutional:       General: She is not in acute distress.     Appearance: She is well-developed. She is not diaphoretic.   HENT:      Head: Normocephalic and atraumatic.   Eyes:      General: No scleral icterus.     Conjunctiva/sclera: Conjunctivae normal.   Cardiovascular:      Rate and Rhythm: Normal rate and regular rhythm.   Pulmonary:      Effort: Pulmonary effort is normal.      Breath sounds: Normal breath sounds.   Abdominal:      Palpations: Abdomen is soft.      Tenderness: There is abdominal tenderness in the right lower quadrant.   Musculoskeletal:         General: No deformity.      Cervical back: Neck supple.   Lymphadenopathy:      Cervical: No cervical adenopathy.   Skin:     General: Skin is warm and dry.      Coloration: Skin is not jaundiced.      Findings: No rash.   Neurological:      Mental Status: She is alert and oriented to person, place, and time. Mental status is at baseline.   Psychiatric:         Mood and Affect: Mood normal.         Behavior: Behavior normal.         ED Course                 Procedures         EKG read at 1347. HR 53, sinus bradycardia, no ST changes.     Critical Care time:  none               Results for orders placed or performed during the hospital encounter of 12/16/22 (from the past 24 hour(s))   UA reflex to Microscopic   Result Value Ref Range    Color Urine Yellow Colorless, Straw, Light Yellow, Yellow    Appearance Urine Clear Clear    Glucose Urine 100 (A) Negative mg/dL    Bilirubin Urine Negative Negative    Ketones Urine 20 (A) Negative mg/dL    " Specific Gravity Urine 1.014 1.000 - 1.030    Blood Urine Trace (A) Negative    pH Urine 8.0 5.0 - 9.0    Protein Albumin Urine 70 (A) Negative mg/dL    Urobilinogen Urine Normal Normal, 2.0 mg/dL    Nitrite Urine Negative Negative    Leukocyte Esterase Urine Negative Negative    RBC Urine 6 (H) <=2 /HPF    WBC Urine 1 <=5 /HPF    Mucus Urine Present (A) None Seen /LPF   CBC with platelets differential    Narrative    The following orders were created for panel order CBC with platelets differential.  Procedure                               Abnormality         Status                     ---------                               -----------         ------                     CBC with platelets and d...[[id number]]                      Final result                 Please view results for these tests on the individual orders.   Comprehensive metabolic panel   Result Value Ref Range    Sodium 135 (L) 136 - 145 mmol/L    Potassium 4.5 3.4 - 5.3 mmol/L    Chloride 98 98 - 107 mmol/L    Carbon Dioxide (CO2) 25 22 - 29 mmol/L    Anion Gap 12 7 - 15 mmol/L    Urea Nitrogen 14.8 8.0 - 23.0 mg/dL    Creatinine 0.71 0.51 - 0.95 mg/dL    Calcium 9.8 8.8 - 10.2 mg/dL    Glucose 142 (H) 70 - 99 mg/dL    Alkaline Phosphatase 59 35 - 104 U/L    AST 24 10 - 35 U/L    ALT 19 10 - 35 U/L    Protein Total 7.4 6.4 - 8.3 g/dL    Albumin 4.4 3.5 - 5.2 g/dL    Bilirubin Total 0.5 <=1.2 mg/dL    GFR Estimate >90 >60 mL/min/1.73m2   Lipase   Result Value Ref Range    Lipase 13 13 - 60 U/L   Lactic acid whole blood   Result Value Ref Range    Lactic Acid 1.1 0.7 - 2.0 mmol/L   Troponin T, High Sensitivity   Result Value Ref Range    Troponin T, High Sensitivity 9 <=14 ng/L   CBC with platelets and differential   Result Value Ref Range    WBC Count 10.1 4.0 - 11.0 10e3/uL    RBC Count 4.71 3.80 - 5.20 10e6/uL    Hemoglobin 14.1 11.7 - 15.7 g/dL    Hematocrit 41.7 35.0 - 47.0 %    MCV 89 78 - 100 fL    MCH 29.9 26.5 - 33.0 pg    MCHC 33.8 31.5 -  36.5 g/dL    RDW 13.0 10.0 - 15.0 %    Platelet Count 231 150 - 450 10e3/uL    % Neutrophils 79 %    % Lymphocytes 18 %    % Monocytes 3 %    % Eosinophils 0 %    % Basophils 0 %    % Immature Granulocytes 0 %    NRBCs per 100 WBC 0 <1 /100    Absolute Neutrophils 7.9 1.6 - 8.3 10e3/uL    Absolute Lymphocytes 1.9 0.8 - 5.3 10e3/uL    Absolute Monocytes 0.3 0.0 - 1.3 10e3/uL    Absolute Eosinophils 0.0 0.0 - 0.7 10e3/uL    Absolute Basophils 0.0 0.0 - 0.2 10e3/uL    Absolute Immature Granulocytes 0.0 <=0.4 10e3/uL    Absolute NRBCs 0.0 10e3/uL   Extra Tube    Narrative    The following orders were created for panel order Extra Tube.  Procedure                               Abnormality         Status                     ---------                               -----------         ------                     Extra Blue Top Tube[[id number]]                              Final result               Extra Serum Separator Tu...[[id number]]                      Final result                 Please view results for these tests on the individual orders.   Extra Blue Top Tube   Result Value Ref Range    Hold Specimen x    Extra Serum Separator Tube (SST)   Result Value Ref Range    Hold Specimen Hold    CT Abdomen Pelvis w Contrast    Narrative    PROCEDURE: CT ABDOMEN PELVIS W CONTRAST 12/16/2022 3:11 PM    HISTORY: RLQ abdominal pain, vomiting    COMPARISONS: None.    Meds/Dose Given: 86 ml isovue 370    TECHNIQUE: Axial postcontrast enhanced images with coronal and  sagittal reformatted images.    FINDINGS: There is some scar or atelectasis at the lung bases.    No focal abnormality is seen in the liver, spleen, adrenal glands or  pancreas. Gallbladder is present.    Kidneys enhance symmetrically. No renal mass or hydronephrosis is  seen.    There is a cluster of small bowel loops in the mid pelvis which  appears abnormal. By enhances less than bowel loops proximal to this  and there is fluid or edema in the mesentery. There may  be some  twisting of the mesentery associated with this. The bowel loops  proximal to this are only mildly dilated measuring up to 2.9 cm but  the bowel loops distal to this are collapsed. This is consistent with  some element of obstruction. Findings are worrisome for possible  internal hernia with twisting of the mesentery and some relative  ischemia of the bowel loops.    No significant abnormality is seen in the colon. No pelvic mass is  seen but there is free fluid in the pelvis.    There is no significant lymph node enlargement.    There is no aneurysm of the abdominal aorta. There is some  atherosclerotic calcification. Calcification is seen in the celiac  artery but no significant calcification is seen in the SMA.    There is no focal bone lesion.         Impression    IMPRESSION: Abnormal cluster of bowel loops in the pelvis with  findings worrisome for ischemic bowel, possibly related to an internal  hernia with some twisting of the mesentery. Fluid or edema is seen in  the mesentery and there is free fluid in the pelvis.    There is some relative obstruction with bowel loops proximal to this  more dilated than bowel loops distal to this.    The abnormal findings on this exam were called to Dr. Martínez in the  emergency room by Dr. Joellen Go on 12/16/2022 at 1520 hours.    JOELLEN GO MD         SYSTEM ID:  [id number]       Medications   0.9% sodium chloride BOLUS (1,000 mLs Intravenous New Bag 12/16/22 1333)   fentaNYL (PF) (SUBLIMAZE) injection 50 mcg (50 mcg Intravenous Given 12/16/22 1434)   ondansetron (ZOFRAN) injection 4 mg (4 mg Intravenous Given 12/16/22 1331)   iopamidol (ISOVUE-370) solution 86 mL (86 mLs Intravenous Given 12/16/22 1502)   ondansetron (ZOFRAN) injection 4 mg (4 mg Intravenous Given 12/16/22 1434)       Assessments & Plan (with Medical Decision Making)   Uncomfortable appearing, generalized abdominal pain, n/v.     Lab work appears well.     Given fluids, zofran,  pain med.    CT read as:  -Abnormal cluster of bowel loops in the pelvis with  findings worrisome for ischemic bowel, possibly related to an internal  hernia with some twisting of the mesentery. Fluid or edema is seen in  the mesentery and there is free fluid in the pelvis.     -There is some relative obstruction with bowel loops proximal to this  more dilated than bowel loops distal to this.    I contacted Dr. Varela, surgeon. She will take the pt to OR.     Mauricio Bates PA-C          I have reviewed the nursing notes.    I have reviewed the findings, diagnosis, plan and need for follow up with the patient.       New Prescriptions    No medications on file       Final diagnoses:   Bowel obstruction (H)       12/16/2022   [facility]     Mauricio Bates PA  12/16/22 2270

## 2022-12-16 NOTE — ED TRIAGE NOTES
patient arrives with vomiting and severe right sided lower abd pain. States ca n not remember when last BM was. States throwing up bile. Pt rates pain 10/10 and is restless in traige. BP high states took meds this am       Triage Assessment     Row Name 12/16/22 1315       Triage Assessment (Adult)    Airway WDL WDL       Respiratory WDL    Respiratory WDL WDL       Skin Circulation/Temperature WDL    Skin Circulation/Temperature WDL X;temperature    Skin Temperature cool       Cardiac WDL    Cardiac WDL X;rhythm    Pulse Rate & Regularity bradycardic       Peripheral/Neurovascular WDL    Peripheral Neurovascular WDL WDL       Cognitive/Neuro/Behavioral WDL    Cognitive/Neuro/Behavioral WDL WDL

## 2022-12-16 NOTE — ED NOTES
Pt changed into hospital gown. Clothing (jeans, hosiery, socks, bra, tank top, and sweater) placed in belongings bag. Pt's jewelry (three rings and one pair of earrings) placed in denture cup and added to belongings bag with pt's clothing.

## 2022-12-16 NOTE — ANESTHESIA PROCEDURE NOTES
Airway       Patient location during procedure: OR       Procedure Start/Stop Times: 12/16/2022 4:51 PM  Staff -        Performed By: CRNAIndications and Patient Condition       Indications for airway management: glenn-procedural       Induction type:RSI (cricoid pressure)       Mask difficulty assessment: 2 - vent by mask + OA or adjuvant +/- NMBA    Final Airway Details       Final airway type: endotracheal airway       Successful airway: ETT - single  Endotracheal Airway Details        ETT size (mm): 7.0       Cuffed: yes       Successful intubation technique: video laryngoscopy       VL Blade Size: Glidescope 4       Grade View of Cords: 2 (anterior view)       Adjucts: stylet       Position: Left       Measured from: lips       Secured at (cm): 21       Bite block used: None    Post intubation assessment        Placement verified by: capnometry, equal breath sounds and chest rise        Number of attempts at approach: 1       Secured with: silk tape       Ease of procedure: easy       Dentition: Intact    Medication(s) Administered   Medication Administration Time: 12/16/2022 4:51 PM

## 2022-12-16 NOTE — ANESTHESIA PREPROCEDURE EVALUATION
"Anesthesia Pre-Procedure Evaluation    Patient: Eloisa Del Rosario   MRN: [id number] : 1955        Procedure : Procedure(s):  LAPAROTOMY          Past Medical History:   Diagnosis Date     Non-Hodgkin lymphoma of extranodal and solid organ sites (H)     1991,Exploratory lap. retroperitoneal lymph node dissection     Personal history of irradiation     Radiation therapy, cesium implants for cervical cancer     Small bowel obstruction (H) 2022      Past Surgical History:   Procedure Laterality Date     COLONOSCOPY           LAPAROTOMY EXPLORATORY      Exploratory lap.retroperitoneal lymph node dissection     SALPINGO-OOPHORECTOMY BILATERAL      Removal of left tube and ovary      Allergies   Allergen Reactions     Erythromycin Itching, Rash, Shortness Of Breath and Hives     Other [No Clinical Screening - See Comments] Anaphylaxis     Cannot have any \"myacin's\".       Social History     Tobacco Use     Smoking status: Never     Smokeless tobacco: Never   Substance Use Topics     Alcohol use: No     Alcohol/week: 0.0 standard drinks      Wt Readings from Last 1 Encounters:   22 68 kg (150 lb)        Anesthesia Evaluation   Pt has had prior anesthetic.     History of anesthetic complications  - motion sickness.      ROS/MED HX  ENT/Pulmonary:       Neurologic:  - neg neurologic ROS     Cardiovascular:  - neg cardiovascular ROS   (+) hypertension-----    METS/Exercise Tolerance: >4 METS    Hematologic:  - neg hematologic  ROS     Musculoskeletal:  - neg musculoskeletal ROS     GI/Hepatic:  - neg GI/hepatic ROS     Renal/Genitourinary:  - neg Renal ROS     Endo:  - neg endo ROS     Psychiatric/Substance Use:  - neg psychiatric ROS     Infectious Disease:  - neg infectious disease ROS     Malignancy:   (+) Malignancy, History of Lymphoma/Leukemia.Lymph CA Remission status post.        Other:  - neg other ROS          Physical Exam    Airway        Mallampati: III   TM distance: < 3 FB   Neck " ROM: full   Mouth opening: > 3 cm    Respiratory Devices and Support         Dental  no notable dental history         Cardiovascular   cardiovascular exam normal          Pulmonary   pulmonary exam normal                OUTSIDE LABS:  CBC:   Lab Results   Component Value Date    WBC 10.1 12/16/2022    WBC 6.6 03/30/2022    HGB 14.1 12/16/2022    HGB 13.7 03/30/2022    HCT 41.7 12/16/2022    HCT 42.5 03/30/2022     12/16/2022     03/30/2022     BMP:   Lab Results   Component Value Date     (L) 12/16/2022     03/30/2022    POTASSIUM 4.5 12/16/2022    POTASSIUM 3.8 03/30/2022    CHLORIDE 98 12/16/2022    CHLORIDE 102 03/30/2022    CO2 25 12/16/2022    CO2 30 03/30/2022    BUN 14.8 12/16/2022    BUN 15 03/30/2022    CR 0.71 12/16/2022    CR 0.81 03/30/2022     (H) 12/16/2022    GLC 88 03/30/2022     COAGS: No results found for: PTT, INR, FIBR  POC: No results found for: BGM, HCG, HCGS  HEPATIC:   Lab Results   Component Value Date    ALBUMIN 4.4 12/16/2022    PROTTOTAL 7.4 12/16/2022    ALT 19 12/16/2022    AST 24 12/16/2022    ALKPHOS 59 12/16/2022    BILITOTAL 0.5 12/16/2022     OTHER:   Lab Results   Component Value Date    LACT 1.1 12/16/2022    A1C 5.3 12/15/2020    GAURI 9.8 12/16/2022    PHOS 3.5 12/15/2020    MAG 1.9 12/15/2020    LIPASE 13 12/16/2022       Anesthesia Plan    ASA Status:  2, emergent    NPO Status:  ELEVATED Aspiration Risk/Unknown (bowel obstruction, nothing to eat since yesterday, sips of water only today)    Anesthesia Type: General.     - Airway: ETT   Induction: Propofol.           Consents    Anesthesia Plan(s) and associated risks, benefits, and realistic alternatives discussed. Questions answered and patient/representative(s) expressed understanding.     - Discussed: Risks, Benefits and Alternatives for BOTH SEDATION and the PROCEDURE were discussed     - Discussed with:  Patient      - Extended Intubation/Ventilatory Support Discussed: No.      - Patient  is DNR/DNI Status: No    Use of blood products discussed: Yes.     - Discussed with: Patient.     - Consented: consented to blood products            Reason for refusal: other.     Postoperative Care    Pain management: IV analgesics, Multi-modal analgesia.   PONV prophylaxis: Ondansetron (or other 5HT-3), Dexamethasone or Solumedrol     Comments:                RYAN PALMER CRNA

## 2022-12-16 NOTE — CONSULTS
[facility]    Surgical Consultation    Date of Admission:  12/16/2022    Assessment & Plan   Eloisa Del Rosario is a 67 year old female who was admitted on 12/16/2022. I was asked to see the patient for abdominal pain.    Active Problems:    Small bowel obstruction (H)    Assessment: suspect closed loop    Plan: discussed CT findings with the patient. Discussed risks, benefits and alternatives to exploration in the OR with general anesthesia with the patient. Specifically discussed the risks of infection, bleeding, injury to abdominal organs, need for bowel resection and possible need for further procedures. Discussed the increased risk of infection and need for bowel resection if we wait and the obstruction doesn't resolve. Patient questions answered. She wishes to proceed. OR crew notified. Bed on MSP will be available per house supervisor.     DVT Prophylaxis: Pneumatic Compression Devices  Code Status: Prior    Nicolette Varela MD    Reason for Consult   Reason for consult: I was asked by JEANETTE Oliver to evaluate this patient for SBO.    Primary Care Physician   Chris Fitch    Chief Complaint   Acute onset of abdominal pain, nausea and vomiting today at 530 am.    History is obtained from the patient and chart review    History of Present Illness   Eloisa Del Rosario is a 67 year old female who presents with nausea and vomiting that started today at 530 am-she awoke with the pain. She has been vomiting and it doesn't make the pain better. She feels like she needs to have a bowel movement but can't. Last BM was yesterday and was normal per her.   She hasn't had anything like this before. Has had exploratory laparotomy before. She has had radiation for cervical cancer. These were several years ago. No recent abdominal surgery.    Past Medical History    I have reviewed this patient's medical history and updated it with pertinent information if needed.   Past Medical History:   Diagnosis Date      Non-Hodgkin lymphoma of extranodal and solid organ sites (H)     05/28/1991,Exploratory lap. retroperitoneal lymph node dissection     Personal history of irradiation     Radiation therapy, cesium implants for cervical cancer     Small bowel obstruction (H) 12/16/2022       Past Surgical History   I have reviewed this patient's surgical history and updated it with pertinent information if needed.  Past Surgical History:   Procedure Laterality Date     COLONOSCOPY      1991     LAPAROTOMY EXPLORATORY      Exploratory lap.retroperitoneal lymph node dissection     SALPINGO-OOPHORECTOMY BILATERAL      Removal of left tube and ovary       Prior to Admission Medications   Prior to Admission Medications   Prescriptions Last Dose Informant Patient Reported? Taking?   Blood Pressure Monitoring (SELF-TAKING BLOOD PRESSURE) KIT   Yes No   Sig: As directed. Dispense what insurance will cover   acetaminophen (TYLENOL) 650 MG CR tablet   Yes No   Sig: Take 1 tablet by mouth every 8 hours as needed for pain Max acetaminophen dose: 4000 mg in 24 hrs   carvedilol (COREG) 6.25 MG tablet   No No   Sig: Take 0.5 tablets (3.125 mg) by mouth 2 times daily (with meals)   lisinopril (ZESTRIL) 20 MG tablet   No No   Sig: Take 1 tablet (20 mg) by mouth every morning AND 0.5 tablets (10 mg) every evening.   order for DME   No No   Sig: Equipment being ordered: thigh high compression nylons, size medium, strength 20-30   rosuvastatin (CRESTOR) 20 MG tablet   No No   Sig: Take 1 tablet (20 mg) by mouth daily - for Cholesterol / heart attack risk reduction   vitamin B-12 (CYANOCOBALAMIN) 2500 MCG sublingual tablet   No No   Sig: Take 1 tablet (2,500 mcg) by mouth Every Mon, Wed, Fri Morning - for Low B12   vitamin D3 (CHOLECALCIFEROL) 50 mcg (2000 units) tablet   No No   Sig: Take 2 tablets (100 mcg) by mouth daily - start for Low Vit D      Facility-Administered Medications: None     Allergies   Allergies   Allergen Reactions      "Erythromycin Itching, Rash, Shortness Of Breath and Hives     Other [No Clinical Screening - See Comments] Anaphylaxis     Cannot have any \"myacin's\".        Social History   I have reviewed this patient's social history and updated it with pertinent information if needed. Eloisa Del Rosario  reports that she has never smoked. She has never used smokeless tobacco. She reports that she does not currently use drugs. She reports that she does not drink alcohol.    Family History   I have reviewed this patient's family history and updated it with pertinent information if needed.   Family History   Problem Relation Age of Onset     Heart Disease Father         Heart Disease, at 57     Diabetes Mother         Diabetes,type II DM     Other - See Comments Mother Provider         at 89     Diabetes Sister         Diabetes,Type II DM -  at about 73     Thyroid Disease Sister         Thyroid Disease     Heart Disease Brother         Heart Disease, at 56       REVIEW OF SYSTEMS  GENERAL: No fevers or chills. Denies fatigue, recent weight loss.  HEENT: No sinus drainage. No changes with vision or hearing. No difficulty swallowing.   LYMPHATICS:  No swollen nodes in axilla, neck or groin.  CARDIOVASCULAR: Denies chest pain, palpitations and dyspnea on exertion.  PULMONARY: No shortness of breath or cough. No increase in sputum production.  GI:Denies melena, bright red blood in stools. No hematemesis. No constipation or diarrhea.  : No dysuria or hematuria.  SKIN: No recent rashes or ulcers.   HEMATOLOGY:  No history of easy bruising or bleeding.  ENDOCRINE:  No history of diabetes or thyroid problems.  NEUROLOGY:  No history of seizures or headaches. No motor or sensory changes.    Physical Exam   Temp: 97  F (36.1  C)   BP: (!) 246/107 Pulse: 54   Resp: 10 SpO2: 92 % O2 Device: None (Room air)    Vital Signs with Ranges  Temp:  [97  F (36.1  C)] 97  F (36.1  C)  Pulse:  [54-57] 54  Resp:  [10-20] 10  BP: " (233-255)/() 246/107  SpO2:  [92 %-99 %] 92 %  150 lbs 0 oz    Constitutional: NAD, pleasant and cooperative  HEENT: normocephalic, no icterus, no nasal drainage, no oral lesions, mucus membranes pink and moist  Respiratory: lungs clear to ausculation bilaterally, no respiratory distress  Cardiovascular: heart regular rate and rhythm, no murmur  Abdomen: bowel sounds quite, soft and non-distended, mid and lower tenderness with palpation  Lymph/Hematologic: No axillary or cervical adenopathy  Skin: pink, warm and dry. No rash or ulceration  Musculoskeletal: calves soft and non-tender  Neurologic: grossly intact, AAO x 3  Psychiatric: appropriate affect    Data   -Data reviewed today: All pertinent laboratory and imaging results from this encounter were reviewed. I personally reviewed the abdominal CT images showing small bowel loop with thickening, mesenteric edema and proximal dilation, distal decompression. No free air.  Recent Labs   Lab 12/16/22  1346   WBC 10.1   HGB 14.1   MCV 89      *   POTASSIUM 4.5   CHLORIDE 98   CO2 25   BUN 14.8   CR 0.71   ANIONGAP 12   GAURI 9.8   *   ALBUMIN 4.4   PROTTOTAL 7.4   BILITOTAL 0.5   ALKPHOS 59   ALT 19   AST 24   LIPASE 13       Recent Results (from the past 24 hour(s))   CT Abdomen Pelvis w Contrast    Narrative    PROCEDURE: CT ABDOMEN PELVIS W CONTRAST 12/16/2022 3:11 PM    HISTORY: RLQ abdominal pain, vomiting    COMPARISONS: None.    Meds/Dose Given: 86 ml isovue 370    TECHNIQUE: Axial postcontrast enhanced images with coronal and  sagittal reformatted images.    FINDINGS: There is some scar or atelectasis at the lung bases.    No focal abnormality is seen in the liver, spleen, adrenal glands or  pancreas. Gallbladder is present.    Kidneys enhance symmetrically. No renal mass or hydronephrosis is  seen.    There is a cluster of small bowel loops in the mid pelvis which  appears abnormal. By enhances less than bowel loops proximal to  this  and there is fluid or edema in the mesentery. There may be some  twisting of the mesentery associated with this. The bowel loops  proximal to this are only mildly dilated measuring up to 2.9 cm but  the bowel loops distal to this are collapsed. This is consistent with  some element of obstruction. Findings are worrisome for possible  internal hernia with twisting of the mesentery and some relative  ischemia of the bowel loops.    No significant abnormality is seen in the colon. No pelvic mass is  seen but there is free fluid in the pelvis.    There is no significant lymph node enlargement.    There is no aneurysm of the abdominal aorta. There is some  atherosclerotic calcification. Calcification is seen in the celiac  artery but no significant calcification is seen in the SMA.    There is no focal bone lesion.         Impression    IMPRESSION: Abnormal cluster of bowel loops in the pelvis with  findings worrisome for ischemic bowel, possibly related to an internal  hernia with some twisting of the mesentery. Fluid or edema is seen in  the mesentery and there is free fluid in the pelvis.    There is some relative obstruction with bowel loops proximal to this  more dilated than bowel loops distal to this.    The abnormal findings on this exam were called to Dr. Martínez in the  emergency room by Dr. Joellen Go on 12/16/2022 at 1520 hours.    JOELLEN GO MD         SYSTEM ID:  [id number]

## 2022-12-16 NOTE — OP NOTE
Preoperative Diagnosis: Small bowel obstruction   Postoperative Diagnosis: close loop small bowel obstruction due to adhesion  Procedure planned: exploratory laparotomy  Procedure performed: exploratory laparotomy with lysis of adhesion  Surgeon: Nicolette Varela MD   Circulator: Daniela De Luna RN  Scrub Person: Ramos Adelina  First Assistant: Cathy Arreaga RN  Anesthesia: General endotracheal, local   Specimen: none  Estimated Blood Loss: less than 20 ml   INDICATIONS   Please see the consultation. The patient has presented to the ED with acute onset of abdominal pain, nausea and vomiting. CT showed possible closed loop obstruction. The risks, benefits and alternatives to exploratory laparotomy were discussed with the patient. We specifically discussed the risks of infection, bleeding, injury to abdominal organs, need for bowel resection and the possibility of an ostomy. The patient expressed understanding and questions were answered. Informed consent paperwork was completed.     DESCRIPTION OF PROCEDURE  The patient was brought to the operating room and placed in a supine position on the operating table. Appropriate monitors were attached.  The patient received IV antibiotics preoperatively. After general anesthesia was induced, the patient was positioned, prepped and draped in the standard fashion. Time out was performed confirming the patient's identity and procedure to be performed.  Local anesthetic was infiltrated in the skin and subcutaneous tissue in the area of planned incision at the scar in the lower mid abdomen. Incision was made sharply and carried down to the subcutaneous tissue. Electrocautery was used to maintain excellent hemostasis. Dissection wascarried out to the level of the fascia. The sutures from her previous surgery were noted and cut and removed. The fascia was opened sharply. The peritoneal cavity was entered bluntly. There was omentum noted immediately deep to the incision with  minimal adhesions to the abdominal wall. Dusky small bowel was noted on the mid lower abdomen. Serous fluid was noted. The bowel was elevated. An adhesion from the sigmoid colon to the right lower retroperitoneum was noted. This was lysed and that freed the 3 feet of small bowel and mesentery.  The bowel pinked up quickly. There were no serosal tears noted. The mesentery didn't show ischemia. The vascular congestion that was initially noted resolved quickely. The small bowel was run proximally to the ligament of Trietz with no other area of obstruction noted. The decompressed distal bowel was run to the ileocecal valve. The loop of small bowel was inspected again and noted to have peristalsis and be pink. The abdomen was irrigated with warm sterile saline. The irrigation was suctioned and returned clear. Local anesthetic was infiltrated for post surgical regional block. The fascial edges were approximated with looped PDS suture. The subcutaneous tissue was irrigated with warm sterile saline. Hemostasis was noted to be excellent. Skin edges were approximated with absorbable staples. Sterile dressing was applied. The patient was then awakened from anesthesia and taken to postanesthesia recovery in stable condition. All needle, sponge and instrument counts were reported as correct at the conclusion of the case.The patient tolerated the procedure with no immediately apparent complications.

## 2022-12-17 LAB — GLUCOSE BLDC GLUCOMTR-MCNC: 148 MG/DL (ref 70–99)

## 2022-12-17 PROCEDURE — 250N000011 HC RX IP 250 OP 636: Performed by: SURGERY

## 2022-12-17 PROCEDURE — 250N000013 HC RX MED GY IP 250 OP 250 PS 637: Performed by: SURGERY

## 2022-12-17 PROCEDURE — 120N000001 HC R&B MED SURG/OB

## 2022-12-17 PROCEDURE — 258N000003 HC RX IP 258 OP 636: Performed by: SURGERY

## 2022-12-17 RX ORDER — HYDRALAZINE HYDROCHLORIDE 20 MG/ML
10 INJECTION INTRAMUSCULAR; INTRAVENOUS ONCE
Status: COMPLETED | OUTPATIENT
Start: 2022-12-17 | End: 2022-12-17

## 2022-12-17 RX ORDER — CHOLECALCIFEROL (VITAMIN D3) 50 MCG
2 TABLET ORAL EVERY OTHER DAY
COMMUNITY
End: 2023-03-30

## 2022-12-17 RX ORDER — ACETAMINOPHEN 500 MG
1000 TABLET ORAL 2 TIMES DAILY PRN
COMMUNITY

## 2022-12-17 RX ORDER — LISINOPRIL 10 MG/1
10 TABLET ORAL AT BEDTIME
Status: DISCONTINUED | OUTPATIENT
Start: 2022-12-17 | End: 2022-12-19 | Stop reason: HOSPADM

## 2022-12-17 RX ORDER — LISINOPRIL 20 MG/1
20 TABLET ORAL DAILY
Status: DISCONTINUED | OUTPATIENT
Start: 2022-12-18 | End: 2022-12-19 | Stop reason: HOSPADM

## 2022-12-17 RX ADMIN — ENOXAPARIN SODIUM 40 MG: 40 INJECTION SUBCUTANEOUS at 12:37

## 2022-12-17 RX ADMIN — KETOROLAC TROMETHAMINE 15 MG: 15 INJECTION, SOLUTION INTRAMUSCULAR; INTRAVENOUS at 06:33

## 2022-12-17 RX ADMIN — FAMOTIDINE 20 MG: 20 TABLET ORAL at 08:36

## 2022-12-17 RX ADMIN — KETOROLAC TROMETHAMINE 15 MG: 15 INJECTION, SOLUTION INTRAMUSCULAR; INTRAVENOUS at 00:48

## 2022-12-17 RX ADMIN — FAMOTIDINE 20 MG: 20 TABLET ORAL at 21:27

## 2022-12-17 RX ADMIN — KETOROLAC TROMETHAMINE 15 MG: 15 INJECTION, SOLUTION INTRAMUSCULAR; INTRAVENOUS at 17:32

## 2022-12-17 RX ADMIN — KETOROLAC TROMETHAMINE 15 MG: 15 INJECTION, SOLUTION INTRAMUSCULAR; INTRAVENOUS at 12:37

## 2022-12-17 RX ADMIN — ACETAMINOPHEN 975 MG: 325 TABLET, FILM COATED ORAL at 21:27

## 2022-12-17 RX ADMIN — ACETAMINOPHEN 975 MG: 325 TABLET, FILM COATED ORAL at 15:26

## 2022-12-17 RX ADMIN — ACETAMINOPHEN 975 MG: 325 TABLET, FILM COATED ORAL at 05:42

## 2022-12-17 RX ADMIN — CARVEDILOL 3.12 MG: 3.12 TABLET, FILM COATED ORAL at 17:31

## 2022-12-17 RX ADMIN — HYDRALAZINE HYDROCHLORIDE 10 MG: 20 INJECTION INTRAMUSCULAR; INTRAVENOUS at 21:35

## 2022-12-17 RX ADMIN — POTASSIUM CHLORIDE, DEXTROSE MONOHYDRATE AND SODIUM CHLORIDE: 150; 5; 450 INJECTION, SOLUTION INTRAVENOUS at 07:57

## 2022-12-17 RX ADMIN — LISINOPRIL 10 MG: 10 TABLET ORAL at 21:27

## 2022-12-17 RX ADMIN — CARVEDILOL 3.12 MG: 3.12 TABLET, FILM COATED ORAL at 08:37

## 2022-12-17 ASSESSMENT — ACTIVITIES OF DAILY LIVING (ADL)
ADLS_ACUITY_SCORE: 22

## 2022-12-17 NOTE — PLAN OF CARE
"Goal Outcome Evaluation:      Plan of Care Reviewed With: patient    Overall Patient Progress: improvingOverall Patient Progress: improving    Low grade temps throughout shift, elevated b/p's, MD Varela updated. TORB to restart home dose lisinopril and monitor temp, and notify MD with temps over 100.5. Pt tolerating clear liquids, passing Flatus, denies nausea. voids spontaneously, ambulating in bennett independently. Pt using IS independently. Pain rated 4-5/10, scheduled tylenol and Toradol given. Dressing on abdomen CDI, will continue to monitor.    BP (!) 186/79   Pulse 67   Temp 99.6  F (37.6  C) (Tympanic)   Resp 18   Ht 1.676 m (5' 6\")   Wt 70 kg (154 lb 4.8 oz)   LMP  (LMP Unknown)   SpO2 97%   BMI 24.90 kg/m      Problem: Plan of Care - These are the overarching goals to be used throughout the patient stay.    Goal: Patient-Specific Goal (Individualized)  Outcome: Progressing  Flowsheets (Taken 12/17/2022 5227)  Individualized Care Needs: abdominal incision  Anxieties, Fears or Concerns: regarding home medications  Patient/Family-Specific Goals (Include Timeframe): monitor vital signs         Problem: Plan of Care - These are the overarching goals to be used throughout the patient stay.    Goal: Absence of Hospital-Acquired Illness or Injury  Outcome: Progressing     Problem: Plan of Care - These are the overarching goals to be used throughout the patient stay.    Goal: Absence of Hospital-Acquired Illness or Injury  Intervention: Identify and Manage Fall Risk  Recent Flowsheet Documentation  Taken 12/17/2022 1730 by Marely Ayala, RN  Safety Promotion/Fall Prevention:   activity supervised   bed alarm on   clutter free environment maintained   fall prevention program maintained   nonskid shoes/slippers when out of bed   safety round/check completed  Taken 12/17/2022 0840 by Marely Ayala, RN  Safety Promotion/Fall Prevention:   activity supervised   bed alarm on   clutter free environment " maintained   fall prevention program maintained   nonskid shoes/slippers when out of bed   safety round/check completed     Problem: Plan of Care - These are the overarching goals to be used throughout the patient stay.    Goal: Absence of Hospital-Acquired Illness or Injury  Intervention: Prevent and Manage VTE (Venous Thromboembolism) Risk  Recent Flowsheet Documentation  Taken 12/17/2022 1730 by Marely Ayala, RN  VTE Prevention/Management: (lovenox) SCDs (sequential compression devices) off  Taken 12/17/2022 0840 by Marely Ayala RN  VTE Prevention/Management: (lovenox) SCDs (sequential compression devices) off     Problem: Intestinal Obstruction  Goal: Optimal Bowel Function  Intervention: Promote Bowel Function  Recent Flowsheet Documentation  Taken 12/17/2022 1030 by Marely Ayala RN  Body Position: position changed independently  Taken 12/17/2022 0835 by Marely Ayala, RN  Chair: Recline and up in chair

## 2022-12-17 NOTE — PHARMACY-ADMISSION MEDICATION HISTORY
Pharmacy -- Admission Medication Reconciliation    Prior to admission (PTA) medications were reviewed and the patient's PTA medication list was updated.    Sources Consulted: patient, sure scripts    The reliability of this Medication Reconciliation is: Reliability: Reliable    The following significant changes were made:  Updated dose of acetaminophen and vitamin D per patient    In addition, the patient's allergies were reviewed with the patient and updated as follows:   Allergies: Erythromycin and Other [no clinical screening - see comments]    The pharmacist has reviewed with the patient that all personal medications should be removed from the building or locked in the belongings safe.  Patient shall only take medications ordered by the physician and administered by the nursing staff.       Medication barriers identified: none noted   Medication adherence concerns: none noted   Understanding of emergency medications: [facility], 12/17/2022,  8:44 AM

## 2022-12-17 NOTE — PROVIDER NOTIFICATION
12/16/22 1846   Valuables   Patient Belongings remains with patient   Patient Belongings Remaining with Patient clothing;cell phone/electronics;purse/wallet;shoes;wallet   Did you bring any home meds/supplements to the hospital?  No   A               Admission:  I am responsible for any personal items that are not sent to the safe or pharmacy.  [facility] is not responsible for loss, theft or damage of any property in my possession.    Signature:  _________________________________ Date: _______  Time: _____                                              Staff Signature:  ____________________________ Date: ________  Time: _____      2nd Staff person, if patient is unable/unwilling to sign:    Signature: ________________________________ Date: ________  Time: _____     Discharge:  [facility] has returned all of my personal belongings:    Signature: _________________________________ Date: ________  Time: _____                                          Staff Signature:  ____________________________ Date: ________  Time: _____

## 2022-12-17 NOTE — ANESTHESIA CARE TRANSFER NOTE
Patient: Eloisa Del Rosario    Procedure: Procedure(s):  LAPAROTOMY       Diagnosis: Small bowel obstruction (H) [K56.609]  Diagnosis Additional Information: No value filed.    Anesthesia Type:   General     Note:    Oropharynx: oropharynx clear of all foreign objects  Level of Consciousness: awake and drowsy  Oxygen Supplementation: face mask  Level of Supplemental Oxygen (L/min / FiO2): 6  Independent Airway: airway patency satisfactory and stable    Vital Signs Stable: post-procedure vital signs reviewed and stable  Report to RN Given: handoff report given  Patient transferred to: PACU    Handoff Report: Identifed the Patient, Identified the Reponsible Provider, Reviewed the pertinent medical history, Discussed the surgical course, Reviewed Intra-OP anesthesia mangement and issues during anesthesia, Set expectations for post-procedure period and Allowed opportunity for questions and acknowledgement of understanding      Vitals:  Vitals Value Taken Time   /97 12/16/22 1800   Temp     Pulse 62 12/16/22 1804   Resp 16 12/16/22 1804   SpO2 98 % 12/16/22 1804   Vitals shown include unvalidated device data.    Electronically Signed By: RYAN PALMER CRNA  December 16, 2022  6:05 PM

## 2022-12-17 NOTE — ANESTHESIA POSTPROCEDURE EVALUATION
Patient: Eloisa Del Rosario    Procedure: Procedure(s):  LAPAROTOMY       Anesthesia Type:  General    Note:  Disposition: Admission   Postop Pain Control: Uneventful            Sign Out: Well controlled pain   PONV: No   Neuro/Psych: Uneventful            Sign Out: Acceptable/Baseline neuro status   Airway/Respiratory: Uneventful            Sign Out: Acceptable/Baseline resp. status   CV/Hemodynamics: Uneventful            Sign Out: Acceptable CV status; No obvious hypovolemia; No obvious fluid overload   Other NRE: NONE   DID A NON-ROUTINE EVENT OCCUR?            Last vitals:  Vitals Value Taken Time   /74 12/16/22 1825   Temp 98.1  F (36.7  C) 12/16/22 1815   Pulse 58 12/16/22 1828   Resp 13 12/16/22 1829   SpO2 93 % 12/16/22 1829   Vitals shown include unvalidated device data.    Electronically Signed By: RYAN PALMER CRNA  December 16, 2022  6:32 PM

## 2022-12-17 NOTE — PROGRESS NOTES
" NS ADMISSION NOTE    Patient admitted to room 315 at approximately 1835 via bed from surgery. Patient was accompanied by nurse.     Verbal SBAR report received from Bobby prior to patient arrival.     Patient trasferred to bed via arrived in bed. Patient alert and oriented X 3. Pain is controlled without any medications.  . Admission vital signs: Blood pressure (!) 168/78, pulse 59, temperature 97.7  F (36.5  C), temperature source Tympanic, resp. rate 16, height 1.676 m (5' 6\"), weight 68 kg (150 lb), SpO2 98 %, not currently breastfeeding. Patient was oriented to plan of care, call light, bed controls, tv, telephone, bathroom and visiting hours.     Risk Assessment    The following safety risks were identified during admission: fall. Yellow risk band applied: YES.         Education    Patient has a Mallory to Observation order: No  Observation education completed and documented: N/A      Marely Ayala RN    "

## 2022-12-17 NOTE — OR NURSING
PACU Transfer Note    Eloisa Del Rosario was transferred to [facility] via bed.  Equipment used for transport:  bed.  Accompanied by:  Bobby schmidt   Prescriptions were:     PACU Respiratory Event Documentation     1) Episodes of Apnea greater than or equal to 10 seconds: no    2) Bradypnea - less than 8 breaths per minute: no    3) Pain score on 0 to 10 scale: no    4) Pain-sedation mismatch (yes or no): no    5) Repeated 02 desaturation less than 90% (yes or no): no    Anesthesia notified? (yes or no): no    Any of the above events occuring repeatedly in separate 30 minute intervals may be considered recurrent PACU respiratory events.    Patient stable and meets phase 1 discharge criteria for transport from PACU.    Report to ayala schmidt

## 2022-12-17 NOTE — PLAN OF CARE
POD #1. Abdominal dressing is clean, dry, and intact. No drainage. Patient denies pain. Passing flatus. Bowel sounds audible. Lauren removed; patient is due to void. +2 BLE edema. Denies nausea. Patient is tolerating ice chips. BPs improving. VSS. Ambulated in room SBA.

## 2022-12-17 NOTE — PROGRESS NOTES
[facility]    Surgical Progress Note  Post Operative Day: 1    Assessment & Plan   Eloisa Del Rosario is a 67 year old female who was admitted on 12/16/2022.     Principal Problem:    Small bowel obstruction (H)    Assessment: pod 1 from adhesiolysis    Plan: saline lock ivf, continue with IS and ambulation. On lovenox. Advance diet slowly as gi function returns. Discussed surgery with patient. Likely here another day or two depending on GI function. Encouraged ice to incision as a non-pharm adjunct.    # Pain Assessment:  Current Pain Score 12/17/2022   Patient currently in pain? yes   - Eloisa is experiencing pain due to surgery. Pain management was discussed and the plan was created in a collaborative fashion.  Eloisa's response to the current recommendations: engaged  - Opioid regimen: continue with AS NEEDED but limited opioids  - Response to opioid medications: hasn't been needing  - Bowel regimen: not needed  On toradol, tylenol, ice and heat availble    DVT Prophylaxis: Enoxaparin (Lovenox) SQ  Code Status: Full Code    Nicolette Varela MD    Interval History   Has passed flatus, not having pain inside like yesterday, only sore at incision. Has voided since king out. Has been for a walk. No nausea or fullness. Up in the chair.   -Data reviewed today: I reviewed all new labs and imaging results over the last 24 hours.    Physical Exam   Temp: 99.3  F (37.4  C) Temp src: Tympanic BP: (!) 146/72 Pulse: 64   Resp: 18 SpO2: 97 % O2 Device: None (Room air) Oxygen Delivery: 6 LPM  Vitals:    12/16/22 1312 12/17/22 0636   Weight: 68 kg (150 lb) 70 kg (154 lb 4.8 oz)     Vital Signs with Ranges  Temp:  [97  F (36.1  C)-99.3  F (37.4  C)] 99.3  F (37.4  C)  Pulse:  [54-70] 64  Resp:  [10-20] 18  BP: (130-255)/() 146/72  SpO2:  [92 %-99 %] 97 %  I/O last 3 completed shifts:  In: 2349 [I.V.:2349]  Out: 1060 [Urine:1050; Blood:10]    Constitutional: No acute distress, pleasant and  cooperative  Respiratory: Clear lungs, no respiratory distress  Cardiovascular: regular rate and rhythm, no murmur  GI: abdomen soft, appropriate tenderness, incision dressing clean dry and intact  Skin/Integument: pink warm and dry, no rash    Medications     dextrose 5% and 0.45% NaCl + KCl 20 mEq/L 75 mL/hr at 12/17/22 0757       acetaminophen  975 mg Oral Q8H     carvedilol  3.125 mg Oral BID w/meals     enoxaparin ANTICOAGULANT  40 mg Subcutaneous Q24H     famotidine  20 mg Oral BID    Or     famotidine  20 mg Intravenous BID     ketorolac  15 mg Intravenous Q6H     sodium chloride (PF)  3 mL Intracatheter Q8H       Data   Recent Labs   Lab 12/17/22  0625 12/16/22  1346   WBC  --  10.1   HGB  --  14.1   MCV  --  89   PLT  --  231   NA  --  135*   POTASSIUM  --  4.5   CHLORIDE  --  98   CO2  --  25   BUN  --  14.8   CR  --  0.71   ANIONGAP  --  12   GAURI  --  9.8   * 142*   ALBUMIN  --  4.4   PROTTOTAL  --  7.4   BILITOTAL  --  0.5   ALKPHOS  --  59   ALT  --  19   AST  --  24   LIPASE  --  13       Recent Results (from the past 24 hour(s))   CT Abdomen Pelvis w Contrast    Narrative    PROCEDURE: CT ABDOMEN PELVIS W CONTRAST 12/16/2022 3:11 PM    HISTORY: RLQ abdominal pain, vomiting    COMPARISONS: None.    Meds/Dose Given: 86 ml isovue 370    TECHNIQUE: Axial postcontrast enhanced images with coronal and  sagittal reformatted images.    FINDINGS: There is some scar or atelectasis at the lung bases.    No focal abnormality is seen in the liver, spleen, adrenal glands or  pancreas. Gallbladder is present.    Kidneys enhance symmetrically. No renal mass or hydronephrosis is  seen.    There is a cluster of small bowel loops in the mid pelvis which  appears abnormal. By enhances less than bowel loops proximal to this  and there is fluid or edema in the mesentery. There may be some  twisting of the mesentery associated with this. The bowel loops  proximal to this are only mildly dilated measuring up to 2.9 cm  but  the bowel loops distal to this are collapsed. This is consistent with  some element of obstruction. Findings are worrisome for possible  internal hernia with twisting of the mesentery and some relative  ischemia of the bowel loops.    No significant abnormality is seen in the colon. No pelvic mass is  seen but there is free fluid in the pelvis.    There is no significant lymph node enlargement.    There is no aneurysm of the abdominal aorta. There is some  atherosclerotic calcification. Calcification is seen in the celiac  artery but no significant calcification is seen in the SMA.    There is no focal bone lesion.         Impression    IMPRESSION: Abnormal cluster of bowel loops in the pelvis with  findings worrisome for ischemic bowel, possibly related to an internal  hernia with some twisting of the mesentery. Fluid or edema is seen in  the mesentery and there is free fluid in the pelvis.    There is some relative obstruction with bowel loops proximal to this  more dilated than bowel loops distal to this.    The abnormal findings on this exam were called to Dr. Martínez in the  emergency room by Dr. Joellen Go on 12/16/2022 at 1520 hours.    JOELLEN GO MD         SYSTEM ID:  [id number]

## 2022-12-18 LAB — GLUCOSE BLDC GLUCOMTR-MCNC: 93 MG/DL (ref 70–99)

## 2022-12-18 PROCEDURE — 120N000001 HC R&B MED SURG/OB

## 2022-12-18 PROCEDURE — 250N000013 HC RX MED GY IP 250 OP 250 PS 637: Performed by: SURGERY

## 2022-12-18 PROCEDURE — 250N000011 HC RX IP 250 OP 636: Performed by: SURGERY

## 2022-12-18 RX ORDER — IBUPROFEN 600 MG/1
600 TABLET, FILM COATED ORAL EVERY 6 HOURS PRN
Status: DISCONTINUED | OUTPATIENT
Start: 2022-12-18 | End: 2022-12-19 | Stop reason: HOSPADM

## 2022-12-18 RX ORDER — POLYETHYLENE GLYCOL 3350 17 G/17G
17 POWDER, FOR SOLUTION ORAL DAILY
Status: DISCONTINUED | OUTPATIENT
Start: 2022-12-18 | End: 2022-12-19 | Stop reason: HOSPADM

## 2022-12-18 RX ADMIN — CARVEDILOL 3.12 MG: 3.12 TABLET, FILM COATED ORAL at 08:17

## 2022-12-18 RX ADMIN — POLYETHYLENE GLYCOL 3350 17 G: 17 POWDER, FOR SOLUTION ORAL at 11:14

## 2022-12-18 RX ADMIN — FAMOTIDINE 20 MG: 20 TABLET ORAL at 21:44

## 2022-12-18 RX ADMIN — FAMOTIDINE 20 MG: 20 TABLET ORAL at 10:38

## 2022-12-18 RX ADMIN — ACETAMINOPHEN 975 MG: 325 TABLET, FILM COATED ORAL at 15:22

## 2022-12-18 RX ADMIN — ACETAMINOPHEN 975 MG: 325 TABLET, FILM COATED ORAL at 05:47

## 2022-12-18 RX ADMIN — IBUPROFEN 600 MG: 600 TABLET ORAL at 12:14

## 2022-12-18 RX ADMIN — CARVEDILOL 3.12 MG: 3.12 TABLET, FILM COATED ORAL at 17:23

## 2022-12-18 RX ADMIN — LISINOPRIL 20 MG: 20 TABLET ORAL at 10:38

## 2022-12-18 RX ADMIN — LISINOPRIL 10 MG: 10 TABLET ORAL at 21:44

## 2022-12-18 RX ADMIN — ENOXAPARIN SODIUM 40 MG: 40 INJECTION SUBCUTANEOUS at 10:38

## 2022-12-18 RX ADMIN — ACETAMINOPHEN 975 MG: 325 TABLET, FILM COATED ORAL at 21:44

## 2022-12-18 ASSESSMENT — ACTIVITIES OF DAILY LIVING (ADL)
ADLS_ACUITY_SCORE: 20
ADLS_ACUITY_SCORE: 22
ADLS_ACUITY_SCORE: 20
ADLS_ACUITY_SCORE: 22
ADLS_ACUITY_SCORE: 20

## 2022-12-18 NOTE — PLAN OF CARE
"Goal Outcome Evaluation:      Plan of Care Reviewed With: patient    Overall Patient Progress: improvingOverall Patient Progress: improving    Low grade temps 99.6, elevated b/p's, MD whitlock aware, scheduled medications given,no new orders at this time. Pt tolerating soft diet,denies nausea, passing flatus. Dressing removed, moderate amount of dried serosanguinous drainage on dressing and steri strips. New steri strips applied, left open to air.  Pt showered, ambulating in bennett independently. Pt reports 4/10 incisional discomfort, PRN Ibuprofen and scheduled tylenol given.    BP (!) 179/87 (BP Location: Left arm)   Pulse 65   Temp 99.6  F (37.6  C) (Tympanic)   Resp 20   Ht 1.676 m (5' 6\")   Wt 68.9 kg (151 lb 12.8 oz)   LMP  (LMP Unknown)   SpO2 97%   BMI 24.50 kg/m        Problem: Plan of Care - These are the overarching goals to be used throughout the patient stay.    Goal: Patient-Specific Goal (Individualized)  Outcome: Progressing  Flowsheets (Taken 12/18/2022 1635)  Individualized Care Needs: abdominal incision  Anxieties, Fears or Concerns: anxiety regarding elevated b/p  Patient/Family-Specific Goals (Include Timeframe): monitor vitals and treat pain as needed     Problem: Plan of Care - These are the overarching goals to be used throughout the patient stay.    Goal: Absence of Hospital-Acquired Illness or Injury  Intervention: Identify and Manage Fall Risk  Recent Flowsheet Documentation  Taken 12/18/2022 1520 by Marely Ayala, RN  Safety Promotion/Fall Prevention:   clutter free environment maintained   fall prevention program maintained   nonskid shoes/slippers when out of bed   safety round/check completed  Taken 12/18/2022 0830 by Marely Ayala, RN  Safety Promotion/Fall Prevention:   clutter free environment maintained   fall prevention program maintained   nonskid shoes/slippers when out of bed   safety round/check completed     Problem: Plan of Care - These are the overarching goals to " be used throughout the patient stay.    Goal: Optimal Comfort and Wellbeing  Outcome: Progressing  Intervention: Monitor Pain and Promote Comfort  Recent Flowsheet Documentation  Taken 12/18/2022 1520 by Marely Ayala RN  Pain Management Interventions: medication (see MAR)  Taken 12/18/2022 1035 by Marely Ayala RN  Pain Management Interventions: declines  Taken 12/18/2022 0816 by Marely Ayala RN  Pain Management Interventions: declines     Problem: Intestinal Obstruction  Goal: Optimal Bowel Function  Intervention: Promote Bowel Function  Recent Flowsheet Documentation  Taken 12/18/2022 1520 by Marely Ayala RN  Body Position: position changed independently  Taken 12/18/2022 1035 by Marely Ayala RN  Chair: Recline and up in chair  Taken 12/18/2022 0816 by Marely Ayala RN  Chair: Recline and up in chair

## 2022-12-18 NOTE — PLAN OF CARE
Alert and oriented. BP elevated, provider aware. Hydralazine given and did come down some but still 190 and 185 systolic. Anxious at times and upset that her BP meds were held for surgery. Educated on this. Afebrile. Complains of 4/10 incisional pain, managed with tylenol. Voiding adequately. Bowel sounds active and passing gas per patient. Dressing clean, dry and intact, some shadowing at top of dressing. Patient was going to shower tonight but decided not to as she was nervous about the incision. Reassured but patient still declined. Up independently.     Molly Howell RN on 12/18/2022 at 4:43 AM

## 2022-12-18 NOTE — PROGRESS NOTES
[facility]    Surgical Progress Note  Post Operative Day: 2    Assessment & Plan   Eloisa Del Rosario is a 67 year old female who was admitted on 12/16/2022.     Principal Problem:    Small bowel obstruction (H)    Assessment: pod 2 from adhesiolysis    Plan: saline lock ivf, continue with IS and ambulation. On lovenox. Advance diet. Discussed surgery with patient. Blood pressure medication is back at home dose. Discussed that fluids and stress from surgery can elevate BP in the short term. She expressed understanding. She can shower with incision open. Anticipate discharge in am if all goes well with diet advance. Will add miralax as she is passing flatus, the stool that was in the colon at surgery was firm.    # Pain Assessment:  Current Pain Score 12/18/2022   Patient currently in pain? yes   - Eloisa is experiencing pain due to surgery. Pain management was discussed and the plan was created in a collaborative fashion.  Eloisa's response to the current recommendations: engaged  - Opioid regimen: continue with AS NEEDED but limited opioids  - Response to opioid medications: hasn't been needing  - Bowel regimen: Miralax  On toradol, tylenol, ice and heat availble    DVT Prophylaxis: Enoxaparin (Lovenox) SQ  Code Status: Full Code    Nicolette Varela MD    Interval History   Has passed quite a bit of flatus, only sore at incision. Voiding well-clear. Has been for walks. No nausea or fullness. Up in the chair. Is thinking about getting a shower. Concerned about her blood pressure.   -Data reviewed today: I reviewed all new labs and imaging results over the last 24 hours.    Physical Exam   Temp: 98.3  F (36.8  C) Temp src: Tympanic BP: (!) 187/70 Pulse: 56   Resp: 18 SpO2: 98 % O2 Device: None (Room air)    Vitals:    12/16/22 1312 12/17/22 0636 12/18/22 0558   Weight: 68 kg (150 lb) 70 kg (154 lb 4.8 oz) 68.9 kg (151 lb 12.8 oz)     Vital Signs with Ranges  Temp:  [97.5  F (36.4  C)-99.9  F (37.7   C)] 98.3  F (36.8  C)  Pulse:  [56-70] 56  Resp:  [16-20] 18  BP: (169-225)/(70-90) 187/70  SpO2:  [96 %-98 %] 98 %  I/O last 3 completed shifts:  In: 1290 [P.O.:1030; I.V.:260]  Out: 2650 [Urine:2650]    Constitutional: No acute distress, pleasant and cooperative  Respiratory: Clear lungs, no respiratory distress  Cardiovascular: regular rate and rhythm, no murmur  GI: abdomen soft, appropriate incisional tenderness-no peritoneal signs, incision clean dry and intact  Skin/Integument: pink warm and dry, no rash    Medications       acetaminophen  975 mg Oral Q8H     carvedilol  3.125 mg Oral BID w/meals     enoxaparin ANTICOAGULANT  40 mg Subcutaneous Q24H     famotidine  20 mg Oral BID    Or     famotidine  20 mg Intravenous BID     lisinopril  10 mg Oral At Bedtime     lisinopril  20 mg Oral Daily     polyethylene glycol  17 g Oral Daily     sodium chloride (PF)  3 mL Intracatheter Q8H       Data   Recent Labs   Lab 12/18/22  0552 12/17/22  0625 12/16/22  1346   WBC  --   --  10.1   HGB  --   --  14.1   MCV  --   --  89   PLT  --   --  231   NA  --   --  135*   POTASSIUM  --   --  4.5   CHLORIDE  --   --  98   CO2  --   --  25   BUN  --   --  14.8   CR  --   --  0.71   ANIONGAP  --   --  12   GAURI  --   --  9.8   GLC 93 148* 142*   ALBUMIN  --   --  4.4   PROTTOTAL  --   --  7.4   BILITOTAL  --   --  0.5   ALKPHOS  --   --  59   ALT  --   --  19   AST  --   --  24   LIPASE  --   --  13       No results found for this or any previous visit (from the past 24 hour(s)).

## 2022-12-19 VITALS
WEIGHT: 150.9 LBS | SYSTOLIC BLOOD PRESSURE: 169 MMHG | RESPIRATION RATE: 16 BRPM | OXYGEN SATURATION: 96 % | DIASTOLIC BLOOD PRESSURE: 87 MMHG | TEMPERATURE: 98 F | HEART RATE: 69 BPM | HEIGHT: 66 IN | BODY MASS INDEX: 24.25 KG/M2

## 2022-12-19 LAB
HOLD SPECIMEN: NORMAL
HOLD SPECIMEN: NORMAL
PLATELET # BLD AUTO: 228 10E3/UL (ref 150–450)

## 2022-12-19 PROCEDURE — 250N000013 HC RX MED GY IP 250 OP 250 PS 637: Performed by: SURGERY

## 2022-12-19 PROCEDURE — 250N000011 HC RX IP 250 OP 636: Performed by: SURGERY

## 2022-12-19 PROCEDURE — 85049 AUTOMATED PLATELET COUNT: CPT | Performed by: SURGERY

## 2022-12-19 PROCEDURE — 36415 COLL VENOUS BLD VENIPUNCTURE: CPT | Performed by: SURGERY

## 2022-12-19 RX ORDER — IBUPROFEN 200 MG
400 TABLET ORAL EVERY 6 HOURS PRN
COMMUNITY
Start: 2022-12-19 | End: 2024-04-03

## 2022-12-19 RX ORDER — ACETAMINOPHEN 325 MG/1
650 TABLET ORAL EVERY 4 HOURS PRN
Status: DISCONTINUED | OUTPATIENT
Start: 2022-12-19 | End: 2022-12-19 | Stop reason: HOSPADM

## 2022-12-19 RX ORDER — POLYETHYLENE GLYCOL 3350 17 G/17G
17 POWDER, FOR SOLUTION ORAL DAILY PRN
Qty: 510 G | Refills: 3 | Status: SHIPPED | OUTPATIENT
Start: 2022-12-19 | End: 2024-04-03

## 2022-12-19 RX ADMIN — ENOXAPARIN SODIUM 40 MG: 40 INJECTION SUBCUTANEOUS at 09:46

## 2022-12-19 RX ADMIN — CARVEDILOL 3.12 MG: 3.12 TABLET, FILM COATED ORAL at 07:44

## 2022-12-19 RX ADMIN — FAMOTIDINE 20 MG: 20 TABLET ORAL at 09:47

## 2022-12-19 RX ADMIN — LISINOPRIL 20 MG: 20 TABLET ORAL at 09:47

## 2022-12-19 RX ADMIN — POLYETHYLENE GLYCOL 3350 17 G: 17 POWDER, FOR SOLUTION ORAL at 09:46

## 2022-12-19 RX ADMIN — ACETAMINOPHEN 975 MG: 325 TABLET, FILM COATED ORAL at 05:27

## 2022-12-19 ASSESSMENT — ACTIVITIES OF DAILY LIVING (ADL)
ADLS_ACUITY_SCORE: 20

## 2022-12-19 NOTE — PLAN OF CARE
Alert and oriented, BP still elevated but improving. Afebrile. Denies pain, only states she has discomfort at incision, managed with tylenol. Slept most of the night. Incision open to air with steri strips. Bowel sounds active. Passing gas but no bowel movement this shift. Tolerating diet well. No nausea. Voiding adequately. Up independently. Ambulates the hallway frequently. Lungs clear bilaterally.    Molly Howell RN on 12/19/2022 at 2:58 AM

## 2022-12-19 NOTE — DISCHARGE SUMMARY
[facility]    Discharge Summary  Hospitalist    Date of Admission:  12/16/2022  Date of Discharge:  12/19/2022  Discharging Provider: Nicolette Varela MD  Date of Service (when I saw the patient): 12/19/22    Discharge Diagnoses   Principal Problem:    Small bowel obstruction (H) (12/16/2022)      History of Present Illness   Eloisa Del Rosario is an 67 year old female who presented with small bowel obstruction due to adhesions    Hospital Course   Eloisa Del Rosario was admitted on 12/16/2022.  The following problems were addressed during her hospitalization:    Small bowel obstruction: surgical adhesiolysis. Did well post surgery, pain has been well controlled. Blood pressure is returning to baseline on home meds. Patient is having flatus and getting miralax. Incision is healing appropriately. Patient feels ready for discharge. Reviewed discharge instructions.     # Discharge Pain Plan:   - During her hospitalization, Eloisa experienced pain due to surgery.  The pain plan for discharge was discussed with Eloisa and the plan was created in a collaborative fashion.    - Pharmacologic adjuvants:  NSAIDs and Acetaminophen  - Non-pharmacologic adjuvants: Heat and Ice    Nicolette Varela MD    Significant Results and Procedures   none    Pending Results   These results will be followed up by none  Unresulted Labs Ordered in the Past 30 Days of this Admission     No orders found from 11/16/2022 to 12/17/2022.          Code Status   Full Code       Primary Care Physician   Chris Fitch    Physical Exam   Temp: 98  F (36.7  C) Temp src: Tympanic BP: (!) 169/87 Pulse: 69   Resp: 16 SpO2: 96 % O2 Device: None (Room air)    Vitals:    12/17/22 0636 12/18/22 0558 12/19/22 0543   Weight: 70 kg (154 lb 4.8 oz) 68.9 kg (151 lb 12.8 oz) 68.4 kg (150 lb 14.4 oz)     Vital Signs with Ranges  Temp:  [98  F (36.7  C)-99.6  F (37.6  C)] 98  F (36.7  C)  Pulse:  [56-77] 69  Resp:  [16-20] 16  BP: (162-187)/(70-87)  169/87  SpO2:  [95 %-98 %] 96 %  I/O last 3 completed shifts:  In: 1120 [P.O.:1120]  Out: 1700 [Urine:1700]    Constitutional: nad  Eyes: no icterus  ENT: mm pink and moist  Respiratory: lungs clear  Cardiovascular: heart regular  GI: incision CDI, abdomen soft with appropriate incisional tenderness    Discharge Disposition   Discharged to home  Condition at discharge: Good    Consultations This Hospital Stay   None    Time Spent on this Encounter   INicolette MD, personally saw the patient today and spent less than or equal to 30 minutes discharging this patient.    Discharge Orders      Activity    Your activity upon discharge: no lifting more than 10 pounds or strenuous exercise until after follow up. OK to drive and go walking as long as your pain is controlled.     Reason for your hospital stay    Small bowel obstruction     Follow-up and recommended labs and tests     Follow up with Dr. Varela , in the office as scheduled.     Diet    Follow this diet upon discharge: Orders Placed This Encounter      Regular diet, limit fresh fruits and vegetables until follow up     Discharge Medications   Current Discharge Medication List      START taking these medications    Details   ibuprofen (ADVIL/MOTRIN) 200 MG tablet Take 2 tablets (400 mg) by mouth every 6 hours as needed for mild pain    Associated Diagnoses: Intestinal adhesions with complete obstruction (H)      polyethylene glycol (MIRALAX) 17 GM/Dose powder Take 17 g by mouth daily as needed for constipation  Qty: 510 g, Refills: 3    Associated Diagnoses: Intestinal adhesions with complete obstruction (H)         CONTINUE these medications which have NOT CHANGED    Details   acetaminophen (TYLENOL) 500 MG tablet Take 1,000 mg by mouth 2 times daily as needed for mild pain      Blood Pressure Monitoring (SELF-TAKING BLOOD PRESSURE) KIT As directed. Dispense what insurance will cover      carvedilol (COREG) 6.25 MG tablet Take 0.5 tablets (3.125 mg) by mouth  "2 times daily (with meals)  Qty: 90 tablet, Refills: 4    Associated Diagnoses: Benign essential hypertension      lisinopril (ZESTRIL) 20 MG tablet Take 1 tablet (20 mg) by mouth every morning AND 0.5 tablets (10 mg) every evening.  Qty: 180 tablet, Refills: 4    Associated Diagnoses: Benign essential hypertension      order for DME Equipment being ordered: thigh high compression nylons, size medium, strength 20-30  Qty: 2 Package, Refills: 11    Associated Diagnoses: Lymphedema of both lower extremities      vitamin B-12 (CYANOCOBALAMIN) 2500 MCG sublingual tablet Take 1 tablet (2,500 mcg) by mouth Every Mon, Wed, Fri Morning - for Low B12  Qty: 100 tablet, Refills: 4    Associated Diagnoses: Vitamin B12 deficiency      vitamin D3 (CHOLECALCIFEROL) 50 mcg (2000 units) tablet Take 2 tablets by mouth every other day      rosuvastatin (CRESTOR) 20 MG tablet Take 1 tablet (20 mg) by mouth daily - for Cholesterol / heart attack risk reduction  Qty: 90 tablet, Refills: 4    Associated Diagnoses: Familial hyperlipidemia           Allergies   Allergies   Allergen Reactions     Erythromycin Itching, Rash, Shortness Of Breath and Hives     Other [No Clinical Screening - See Comments] Anaphylaxis     Cannot have any \"myacin's\".   Macrolide antibiotics     Data   Most Recent 3 CBC's:  Recent Labs   Lab Test 12/19/22  0558 12/16/22  1346 03/30/22  1023 12/15/20  1657   WBC  --  10.1 6.6 7.6   HGB  --  14.1 13.7 13.7   MCV  --  89 90 87    231 286 276      Most Recent 3 BMP's:  Recent Labs   Lab Test 12/18/22  0552 12/17/22  0625 12/16/22  1346 03/30/22  1023 12/15/20  1657   NA  --   --  135* 138 139   POTASSIUM  --   --  4.5 3.8 4.3   CHLORIDE  --   --  98 102 102   CO2  --   --  25 30 29   BUN  --   --  14.8 15 16   CR  --   --  0.71 0.81 0.87   ANIONGAP  --   --  12 6 8   GAURI  --   --  9.8 9.5 9.6   GLC 93 148* 142* 88 97     Most Recent 2 LFT's:  Recent Labs   Lab Test 12/16/22  1346 12/15/20  1657   AST 24 16   ALT " 19 12   ALKPHOS 59 54   BILITOTAL 0.5 0.3     Most Recent INR's and Anticoagulation Dosing History:  Anticoagulation Dose History    There is no flowsheet data to display.       Most Recent 3 Troponin's:No lab results found.  Most Recent Cholesterol Panel:  Recent Labs   Lab Test 05/27/22  0920   CHOL 155   LDL 85   HDL 46   TRIG 119     Most Recent 6 Bacteria Isolates From Any Culture (See EPIC Reports for Culture Details):No lab results found.  Most Recent TSH, T4 and A1c Labs:  Recent Labs   Lab Test 12/15/20  1657   A1C 5.3     Results for orders placed or performed during the hospital encounter of 12/16/22   CT Abdomen Pelvis w Contrast    Narrative    PROCEDURE: CT ABDOMEN PELVIS W CONTRAST 12/16/2022 3:11 PM    HISTORY: RLQ abdominal pain, vomiting    COMPARISONS: None.    Meds/Dose Given: 86 ml isovue 370    TECHNIQUE: Axial postcontrast enhanced images with coronal and  sagittal reformatted images.    FINDINGS: There is some scar or atelectasis at the lung bases.    No focal abnormality is seen in the liver, spleen, adrenal glands or  pancreas. Gallbladder is present.    Kidneys enhance symmetrically. No renal mass or hydronephrosis is  seen.    There is a cluster of small bowel loops in the mid pelvis which  appears abnormal. By enhances less than bowel loops proximal to this  and there is fluid or edema in the mesentery. There may be some  twisting of the mesentery associated with this. The bowel loops  proximal to this are only mildly dilated measuring up to 2.9 cm but  the bowel loops distal to this are collapsed. This is consistent with  some element of obstruction. Findings are worrisome for possible  internal hernia with twisting of the mesentery and some relative  ischemia of the bowel loops.    No significant abnormality is seen in the colon. No pelvic mass is  seen but there is free fluid in the pelvis.    There is no significant lymph node enlargement.    There is no aneurysm of the abdominal  aorta. There is some  atherosclerotic calcification. Calcification is seen in the celiac  artery but no significant calcification is seen in the SMA.    There is no focal bone lesion.         Impression    IMPRESSION: Abnormal cluster of bowel loops in the pelvis with  findings worrisome for ischemic bowel, possibly related to an internal  hernia with some twisting of the mesentery. Fluid or edema is seen in  the mesentery and there is free fluid in the pelvis.    There is some relative obstruction with bowel loops proximal to this  more dilated than bowel loops distal to this.    The abnormal findings on this exam were called to Dr. Martínez in the  emergency room by Dr. Joellen Go on 12/16/2022 at 1520 hours.    JOELLEN GO MD         SYSTEM ID:  [id number]

## 2022-12-19 NOTE — PHARMACY - DISCHARGE MEDICATION RECONCILIATION AND EDUCATION
Pharmacy:  Discharge Counseling and Medication Reconciliation    Eloisa Del Rosario  [address]   [facility] [address]  [phone] (home) [phone] (work)  67 year old female  PCP: Chris Fitch    Allergies: Erythromycin and Other [no clinical screening - see comments]    Discharge Counseling:    Pharmacist met with patient today to review the medication portion of the After Visit Summary (with an emphasis on NEW medications) and to address patient's questions/concerns.    Summary of Education: Counseled patient on new medications of Ibuprofen and Miralax, including indication, administration, and possible common side effects. Reviewed with patient to limit Ibuprofen use as able, as sustained use can be hard on the kidneys. Also reviewed proper administration of Miralax- to mix in 4-8 ounces of fluid of choice prior to administration.    Advised patient that Miralax was sent as a prescription to [facility], but is also available for purchase over the counter along with Ibuprofen.    Materials Provided:  MedCounselor sheets printed from Clinical Pharmacology on: Miralax, Ibuprofen    Discharge Medication Reconciliation:    It has been determined that the patient has an adequate supply of medications available or which can be obtained from the patient's preferred pharmacy, which she has confirmed as: [facility].    Thank you for the consult.    Blade Harding [facility]........December 19, 2022 11:06 AM

## 2022-12-19 NOTE — PROGRESS NOTES
[facility] DISCHARGE NOTE    Patient discharged to home at 10:43 AM via wheel chair. Accompanied by other:friend and staff. Discharge instructions reviewed with patient, opportunity offered to ask questions. Prescriptions sent to patients preferred pharmacy. All belongings sent with patient.    Alicia Cai RN

## 2022-12-20 ENCOUNTER — PATIENT OUTREACH (OUTPATIENT)
Dept: FAMILY MEDICINE | Facility: OTHER | Age: 67
End: 2022-12-20

## 2022-12-20 LAB
ATRIAL RATE - MUSE: 53 BPM
DIASTOLIC BLOOD PRESSURE - MUSE: NORMAL MMHG
INTERPRETATION ECG - MUSE: NORMAL
P AXIS - MUSE: 63 DEGREES
PR INTERVAL - MUSE: 168 MS
QRS DURATION - MUSE: 96 MS
QT - MUSE: 480 MS
QTC - MUSE: 450 MS
R AXIS - MUSE: 46 DEGREES
SYSTOLIC BLOOD PRESSURE - MUSE: NORMAL MMHG
T AXIS - MUSE: 62 DEGREES
VENTRICULAR RATE- MUSE: 53 BPM

## 2022-12-20 NOTE — TELEPHONE ENCOUNTER
Surgical. Patient discharged with orthopedic surgical follow-up only. No TCM call required per policy.       Donato Gardner RN  ....................  12/20/2022   7:41 AM

## 2022-12-23 ENCOUNTER — MYC MEDICAL ADVICE (OUTPATIENT)
Dept: INTERNAL MEDICINE | Facility: OTHER | Age: 67
End: 2022-12-23

## 2023-01-03 ENCOUNTER — OFFICE VISIT (OUTPATIENT)
Dept: SURGERY | Facility: OTHER | Age: 68
End: 2023-01-03
Attending: SURGERY
Payer: COMMERCIAL

## 2023-01-03 VITALS
BODY MASS INDEX: 24.69 KG/M2 | DIASTOLIC BLOOD PRESSURE: 92 MMHG | SYSTOLIC BLOOD PRESSURE: 178 MMHG | RESPIRATION RATE: 16 BRPM | HEART RATE: 62 BPM | WEIGHT: 153 LBS | TEMPERATURE: 99.7 F | OXYGEN SATURATION: 97 %

## 2023-01-03 DIAGNOSIS — Z09 FOLLOW-UP EXAMINATION AFTER GASTROINTESTINAL SURGERY: Primary | ICD-10-CM

## 2023-01-03 PROCEDURE — 99024 POSTOP FOLLOW-UP VISIT: CPT | Performed by: SURGERY

## 2023-01-03 PROCEDURE — G0463 HOSPITAL OUTPT CLINIC VISIT: HCPCS

## 2023-01-03 ASSESSMENT — PAIN SCALES - GENERAL: PAINLEVEL: NO PAIN (0)

## 2023-01-03 NOTE — PROGRESS NOTES
Patient presents for post surgical visit after exploratory laparotomy and adhesiolysis for SBO on  12/16/22. Patient has done well. No problems with incision.    BP (!) 178/92   Pulse 62   Temp 99.7  F (37.6  C) (Tympanic)   Resp 16   Wt 69.4 kg (153 lb)   LMP  (LMP Unknown)   SpO2 97%   BMI 24.69 kg/m    General: NAD, pleasant and cooperative with exam and interview.  Abdomen: healing incision. No sign of infection. Appropriate pain with palpation.  Psychiatry: awake, alert and oriented. Appropriate affect.    Assessment/Plan:  Discussed surgery. Patient can return to normal activities in one more week-can slowly start lifting weights this week. Patient will call with questions or concerns.

## 2023-01-03 NOTE — NURSING NOTE
"Chief Complaint   Patient presents with     Surgical Followup     S/p laparotomy       Initial BP (!) 180/88 (BP Location: Right arm, Patient Position: Sitting, Cuff Size: Adult Regular)   Pulse 62   Temp 99.7  F (37.6  C) (Tympanic)   Resp 16   Wt 69.4 kg (153 lb)   LMP  (LMP Unknown)   SpO2 97%   BMI 24.69 kg/m   Estimated body mass index is 24.69 kg/m  as calculated from the following:    Height as of 12/16/22: 1.676 m (5' 6\").    Weight as of this encounter: 69.4 kg (153 lb).  Medication Reconciliation: complete    Sangeetha Edmondson LPN    "

## 2023-01-05 ENCOUNTER — TELEPHONE (OUTPATIENT)
Dept: INTERNAL MEDICINE | Facility: OTHER | Age: 68
End: 2023-01-05

## 2023-01-05 ENCOUNTER — DOCUMENTATION ONLY (OUTPATIENT)
Dept: INTERNAL MEDICINE | Facility: OTHER | Age: 68
End: 2023-01-05

## 2023-01-05 DIAGNOSIS — I89.0 OBLITERATION OF LYMPHATIC VESSEL: Primary | ICD-10-CM

## 2023-01-05 NOTE — TELEPHONE ENCOUNTER
Prescription faxed per Dr. Fitch to [facility].  Confirmation received.    David Reyna .......  1/5/2023  2:29 PM

## 2023-03-23 ASSESSMENT — ENCOUNTER SYMPTOMS
SHORTNESS OF BREATH: 0
SORE THROAT: 0
WEAKNESS: 0
NAUSEA: 0
EYE PAIN: 0
ABDOMINAL PAIN: 1
DIZZINESS: 1
HEARTBURN: 0
NERVOUS/ANXIOUS: 1
BREAST MASS: 0
HEMATOCHEZIA: 0
HEADACHES: 0
MYALGIAS: 1
DIARRHEA: 0
ARTHRALGIAS: 1
PALPITATIONS: 0
CHILLS: 1
FREQUENCY: 1
CONSTIPATION: 0
PARESTHESIAS: 0
COUGH: 1
DYSURIA: 0
HEMATURIA: 0
JOINT SWELLING: 0
FEVER: 0

## 2023-03-23 ASSESSMENT — ACTIVITIES OF DAILY LIVING (ADL): CURRENT_FUNCTION: NO ASSISTANCE NEEDED

## 2023-03-28 NOTE — PROGRESS NOTES
Voicemail box has not been set up yet. Unable to reach patient for PVP. Elvira Hutton RN on 3/28/2023 at 10:54 AM

## 2023-03-30 ENCOUNTER — MYC MEDICAL ADVICE (OUTPATIENT)
Dept: INTERNAL MEDICINE | Facility: OTHER | Age: 68
End: 2023-03-30

## 2023-03-30 ENCOUNTER — OFFICE VISIT (OUTPATIENT)
Dept: INTERNAL MEDICINE | Facility: OTHER | Age: 68
End: 2023-03-30
Attending: INTERNAL MEDICINE
Payer: COMMERCIAL

## 2023-03-30 VITALS
WEIGHT: 156.4 LBS | TEMPERATURE: 97.2 F | HEIGHT: 65 IN | DIASTOLIC BLOOD PRESSURE: 88 MMHG | OXYGEN SATURATION: 97 % | HEART RATE: 58 BPM | BODY MASS INDEX: 26.06 KG/M2 | RESPIRATION RATE: 20 BRPM | SYSTOLIC BLOOD PRESSURE: 138 MMHG

## 2023-03-30 DIAGNOSIS — I10 BENIGN ESSENTIAL HYPERTENSION: Primary | ICD-10-CM

## 2023-03-30 DIAGNOSIS — E55.9 VITAMIN D DEFICIENCY: ICD-10-CM

## 2023-03-30 DIAGNOSIS — E53.8 VITAMIN B12 DEFICIENCY: ICD-10-CM

## 2023-03-30 DIAGNOSIS — Z12.11 COLON CANCER SCREENING: ICD-10-CM

## 2023-03-30 DIAGNOSIS — Z71.85 VACCINE COUNSELING: ICD-10-CM

## 2023-03-30 DIAGNOSIS — M81.0 AGE-RELATED OSTEOPOROSIS WITHOUT CURRENT PATHOLOGICAL FRACTURE: ICD-10-CM

## 2023-03-30 DIAGNOSIS — R60.0 BILATERAL LOWER EXTREMITY EDEMA: ICD-10-CM

## 2023-03-30 DIAGNOSIS — I10 WHITE COAT SYNDROME WITH HYPERTENSION: ICD-10-CM

## 2023-03-30 DIAGNOSIS — E78.49 FAMILIAL HYPERLIPIDEMIA: ICD-10-CM

## 2023-03-30 DIAGNOSIS — B02.9 HERPES ZOSTER WITHOUT COMPLICATION: ICD-10-CM

## 2023-03-30 DIAGNOSIS — Z00.00 ENCOUNTER FOR MEDICARE ANNUAL WELLNESS EXAM: ICD-10-CM

## 2023-03-30 DIAGNOSIS — I89.0 LYMPHEDEMA OF BOTH LOWER EXTREMITIES: ICD-10-CM

## 2023-03-30 DIAGNOSIS — Z12.31 VISIT FOR SCREENING MAMMOGRAM: ICD-10-CM

## 2023-03-30 DIAGNOSIS — M21.41 FALLEN ARCHES: ICD-10-CM

## 2023-03-30 DIAGNOSIS — F41.9 CHRONIC ANXIETY: ICD-10-CM

## 2023-03-30 DIAGNOSIS — R73.9 ELEVATED RANDOM BLOOD GLUCOSE LEVEL: ICD-10-CM

## 2023-03-30 DIAGNOSIS — M21.42 FALLEN ARCHES: ICD-10-CM

## 2023-03-30 PROBLEM — K56.609 SMALL BOWEL OBSTRUCTION (H): Status: RESOLVED | Noted: 2022-12-16 | Resolved: 2023-03-30

## 2023-03-30 LAB
ALBUMIN SERPL BCG-MCNC: 3.8 G/DL (ref 3.5–5.2)
ALP SERPL-CCNC: 54 U/L (ref 35–104)
ALT SERPL W P-5'-P-CCNC: 12 U/L (ref 10–35)
ANION GAP SERPL CALCULATED.3IONS-SCNC: 6 MMOL/L (ref 7–15)
AST SERPL W P-5'-P-CCNC: 17 U/L (ref 10–35)
BILIRUB SERPL-MCNC: 0.3 MG/DL
BUN SERPL-MCNC: 16.9 MG/DL (ref 8–23)
CALCIUM SERPL-MCNC: 9 MG/DL (ref 8.8–10.2)
CHLORIDE SERPL-SCNC: 105 MMOL/L (ref 98–107)
CHOLEST SERPL-MCNC: 173 MG/DL
CREAT SERPL-MCNC: 0.8 MG/DL (ref 0.51–0.95)
DEPRECATED HCO3 PLAS-SCNC: 30 MMOL/L (ref 22–29)
ERYTHROCYTE [DISTWIDTH] IN BLOOD BY AUTOMATED COUNT: 13.2 % (ref 10–15)
GFR SERPL CREATININE-BSD FRML MDRD: 81 ML/MIN/1.73M2
GLUCOSE SERPL-MCNC: 91 MG/DL (ref 70–99)
HBA1C MFR BLD: 5.5 % (ref 4–6.2)
HCT VFR BLD AUTO: 39.1 % (ref 35–47)
HDLC SERPL-MCNC: 63 MG/DL
HGB BLD-MCNC: 12.8 G/DL (ref 11.7–15.7)
LDLC SERPL CALC-MCNC: 90 MG/DL
MCH RBC QN AUTO: 29.2 PG (ref 26.5–33)
MCHC RBC AUTO-ENTMCNC: 32.7 G/DL (ref 31.5–36.5)
MCV RBC AUTO: 89 FL (ref 78–100)
NONHDLC SERPL-MCNC: 110 MG/DL
PLATELET # BLD AUTO: 218 10E3/UL (ref 150–450)
POTASSIUM SERPL-SCNC: 4.3 MMOL/L (ref 3.4–5.3)
PROT SERPL-MCNC: 6.3 G/DL (ref 6.4–8.3)
RBC # BLD AUTO: 4.39 10E6/UL (ref 3.8–5.2)
SODIUM SERPL-SCNC: 141 MMOL/L (ref 136–145)
TRIGL SERPL-MCNC: 101 MG/DL
TSH SERPL DL<=0.005 MIU/L-ACNC: 2.5 UIU/ML (ref 0.3–4.2)
VIT B12 SERPL-MCNC: 358 PG/ML (ref 232–1245)
WBC # BLD AUTO: 6.5 10E3/UL (ref 4–11)

## 2023-03-30 PROCEDURE — 82306 VITAMIN D 25 HYDROXY: CPT | Mod: ZL | Performed by: INTERNAL MEDICINE

## 2023-03-30 PROCEDURE — 83036 HEMOGLOBIN GLYCOSYLATED A1C: CPT | Mod: ZL | Performed by: INTERNAL MEDICINE

## 2023-03-30 PROCEDURE — 99214 OFFICE O/P EST MOD 30 MIN: CPT | Mod: 25 | Performed by: INTERNAL MEDICINE

## 2023-03-30 PROCEDURE — 36415 COLL VENOUS BLD VENIPUNCTURE: CPT | Mod: ZL | Performed by: INTERNAL MEDICINE

## 2023-03-30 PROCEDURE — 80061 LIPID PANEL: CPT | Mod: ZL | Performed by: INTERNAL MEDICINE

## 2023-03-30 PROCEDURE — G0463 HOSPITAL OUTPT CLINIC VISIT: HCPCS

## 2023-03-30 PROCEDURE — G0439 PPPS, SUBSEQ VISIT: HCPCS | Performed by: INTERNAL MEDICINE

## 2023-03-30 PROCEDURE — 84443 ASSAY THYROID STIM HORMONE: CPT | Mod: ZL | Performed by: INTERNAL MEDICINE

## 2023-03-30 PROCEDURE — 82607 VITAMIN B-12: CPT | Mod: ZL | Performed by: INTERNAL MEDICINE

## 2023-03-30 PROCEDURE — 85027 COMPLETE CBC AUTOMATED: CPT | Mod: ZL | Performed by: INTERNAL MEDICINE

## 2023-03-30 PROCEDURE — 80053 COMPREHEN METABOLIC PANEL: CPT | Mod: ZL | Performed by: INTERNAL MEDICINE

## 2023-03-30 RX ORDER — CARVEDILOL 6.25 MG/1
3.12 TABLET ORAL 2 TIMES DAILY WITH MEALS
Qty: 90 TABLET | Refills: 4 | Status: SHIPPED | OUTPATIENT
Start: 2023-03-30 | End: 2023-08-16

## 2023-03-30 RX ORDER — ROSUVASTATIN CALCIUM 20 MG/1
20 TABLET, COATED ORAL DAILY
Qty: 90 TABLET | Refills: 4 | Status: SHIPPED | OUTPATIENT
Start: 2023-03-30 | End: 2024-03-11

## 2023-03-30 RX ORDER — LISINOPRIL 20 MG/1
TABLET ORAL
Qty: 180 TABLET | Refills: 4 | Status: SHIPPED | OUTPATIENT
Start: 2023-03-30 | End: 2023-08-15

## 2023-03-30 RX ORDER — CYANOCOBALAMIN (VITAMIN B-12) 2500 MCG
2500 TABLET, SUBLINGUAL SUBLINGUAL
Qty: 100 TABLET | Refills: 4 | Status: SHIPPED | OUTPATIENT
Start: 2023-03-31 | End: 2023-10-10

## 2023-03-30 RX ORDER — TORSEMIDE 10 MG/1
10 TABLET ORAL DAILY
Qty: 90 TABLET | Refills: 4 | Status: SHIPPED | OUTPATIENT
Start: 2023-03-30 | End: 2024-04-03

## 2023-03-30 RX ORDER — CHOLECALCIFEROL (VITAMIN D3) 50 MCG
2 TABLET ORAL EVERY OTHER DAY
Qty: 180 TABLET | Refills: 4 | Status: SHIPPED | OUTPATIENT
Start: 2023-03-30 | End: 2024-03-11

## 2023-03-30 ASSESSMENT — ENCOUNTER SYMPTOMS
NAUSEA: 0
HEMATURIA: 0
DYSURIA: 0
WEAKNESS: 0
BREAST MASS: 0
CHILLS: 1
HEARTBURN: 0
EYE PAIN: 0
NERVOUS/ANXIOUS: 1
ABDOMINAL PAIN: 1
SORE THROAT: 0
PARESTHESIAS: 0
DIARRHEA: 0
HEADACHES: 0
SHORTNESS OF BREATH: 0
DIZZINESS: 1
CONSTIPATION: 0
ARTHRALGIAS: 1
BRUISES/BLEEDS EASILY: 1
PALPITATIONS: 0
JOINT SWELLING: 0
FREQUENCY: 1
MYALGIAS: 1
COUGH: 1
FEVER: 0
HEMATOCHEZIA: 0

## 2023-03-30 ASSESSMENT — PAIN SCALES - GENERAL: PAINLEVEL: NO PAIN (0)

## 2023-03-30 ASSESSMENT — ACTIVITIES OF DAILY LIVING (ADL): CURRENT_FUNCTION: NO ASSISTANCE NEEDED

## 2023-03-30 NOTE — PATIENT INSTRUCTIONS
Blood pressure at home has been reasonably controlled.   -- Start Torsemide 10 mg daily.     Medications refilled.   Labs are pending.     - Mammogram ordered  - they will call with date/time of appointment.      Cologuard Patient Instructions    An order was placed for a Cologuard stool blood testing kit.  You will receive your kit in the mail. Please follow the instructions that are provided in the kit.      Reminder: Ship Cologuard test the same day or the next day to allow enough delivery time. The lab must receive your specimen within 4 days for successful testing. If not delivered in time, you may have to complete the process again.    Once you have completed the collection and have it ready to be shipped, bring it to one of the following sites listed below. *Note: none of the sites ship out later than mid-morning. Please do not drop off Fridays, as they will not go out until Monday which will make your specimen inacceptable.     - [facility] ([address], [place] [address])      Drop off: Monday-Thursday, 8:00am-4:30pm at the [address] check-in desk      - [facility] ([address], [place] [address])   Drop off: Monday-Thursday, 8:00am-5:45pm     - [facility] ([address], [place] [address])     Drop off: Monday-Thursday, 3:00pm-5:30pm       -- If positive test and blood is found in the stool, we will need to schedule a colonoscopy.   -- If negative, no blood noted, recheck in 3 years.        Podiatry referral - left foot flattening  - they will call with date/time of appointment.        Immunization History   Administered Date(s) Administered    COVID-19 Vaccine 12+ (Pfizer) 05/27/2021, 06/17/2021    TDAP Vaccine (Boostrix) 02/19/2009      Consider:  -- Yearly BIVALENT - COVID-19 Vaccination shot (Fall 2022 = Omicron 4 and 5/COVID)  -- Annual Flu / Influenza vaccination - Every Fall (Starting about October 1st)    -- Pneumonia / Pneumococcal PCV vaccine -- Anytime.     --  Get your tetanus shot updated - from one of the local pharmacies, at your convenience or the Local [facility].     -- Get the new shingles shot (2 in series) (Shingrix) - from one of the local pharmacies, at your convenience. -- Check cost/coverage.   -- or -- If these are very expensive, go to the Local Public Health Department     [facility] -- [place] [address]    ---- Shot clinic is the FIRST Thursday of Each Month -- from 2 to 6 PM, by Appointment only.     Call for an appointment -- [phone]      Patient Education   Personalized Prevention Plan  You are due for the preventive services outlined below.  Your care team is available to assist you in scheduling these services.  If you have already completed any of these items, please share that information with your care team to update in your medical record.  Health Maintenance Due   Topic Date Due    Zoster (Shingles) Vaccine (1 of 2) Never done    Diptheria Tetanus Pertussis (DTAP/TDAP/TD) Vaccine (2 - Td or Tdap) 02/19/2019    COVID-19 Vaccine (3 - Booster for Pfizer series) 08/12/2021    Pneumococcal Vaccine (1 - PCV) Never done    Mammogram  12/23/2022    Colorectal Cancer Screening  01/09/2023     Preventive Health Recommendations    See your health care provider every year to  Review health changes.   Discuss preventive care.    Review your medicines if your doctor has prescribed any.  You no longer need a yearly Pap test unless you've had an abnormal Pap test in the past 10 years. If you have vaginal symptoms, such as bleeding or discharge, be sure to talk with your provider about a Pap test.  Every 1 to 2 years, have a mammogram.  If you are over 69, talk with your health care provider about whether or not you want to continue having screening mammograms.  Every 10 years, have a colonoscopy. Or, have a yearly FIT test (stool test). These exams will check for  colon cancer.   Have a cholesterol test every 5 years, or more often if your doctor advises it.   Have a diabetes test (fasting glucose) every three years. If you are at risk for diabetes, you should have this test more often.   At age 65, have a bone density scan (DEXA) to check for osteoporosis (brittle bone disease).    Shots:  Get a flu shot each year.  Get a tetanus shot every 10 years.  Talk to your doctor about your pneumonia vaccines. There are now two you should receive - Pneumovax (PPSV 23) and Prevnar (PCV 13).  Talk to your pharmacist about the shingles vaccine.  Talk to your doctor about the hepatitis B vaccine.    Nutrition:   Eat at least 5 servings of fruits and vegetables each day.  Eat whole-grain bread, whole-wheat pasta and brown rice instead of white grains and rice.  Get adequate Calcium and Vitamin D.     Lifestyle  Exercise at least 150 minutes a week (30 minutes a day, 5 days a week). This will help you control your weight and prevent disease.  Limit alcohol to one drink per day.  No smoking.   Wear sunscreen to prevent skin cancer.   See your dentist twice a year for an exam and cleaning.  See your eye doctor every 1 to 2 years to screen for conditions such as glaucoma, macular degeneration and cataracts.    Personalized Prevention Plan  You are due for the preventive services outlined below.  Your care team is available to assist you in scheduling these services.  If you have already completed any of these items, please share that information with your care team to update in your medical record.  Health Maintenance   Topic Date Due    ZOSTER IMMUNIZATION (1 of 2) Never done    DTAP/TDAP/TD IMMUNIZATION (2 - Td or Tdap) 02/19/2019    COVID-19 Vaccine (3 - Booster for Pfizer series) 08/12/2021    Pneumococcal Vaccine: 65+ Years (1 - PCV) Never done    MAMMO SCREENING  12/23/2022    COLORECTAL CANCER SCREENING  01/09/2023    MEDICARE ANNUAL WELLNESS VISIT  03/30/2024    FALL RISK ASSESSMENT   03/30/2024    LIPID  05/27/2027    ADVANCE CARE PLANNING  03/30/2028    DEXA  07/30/2036    PHQ-2 (once per calendar year)  Completed    HEPATITIS C SCREENING  Addressed    IPV IMMUNIZATION  Aged Out    MENINGITIS IMMUNIZATION  Aged Out    INFLUENZA VACCINE  Discontinued       Understanding USDA MyPlate  The USDA has guidelines to help you make healthy food choices. These are called MyPlate. MyPlate shows the food groups that make up healthy meals using the image of a place setting. Before you eat, think about the healthiest choices for what to put on your plate or in your cup or bowl. To learn more about building a healthy plate, visit www.choosemyplate.gov.     The food groups  Fruits. Any fruit or 100% fruit juice counts as part of the Fruit Group. Fruits may be fresh, canned, frozen, or dried, and may be whole, cut-up, or pureed. Make 1/2 of your plate fruits and vegetables.  Vegetables. Any vegetable or 100% vegetable juice counts as a member of the Vegetable Group. Vegetables may be fresh, frozen, canned, or dried. They can be served raw or cooked and may be whole, cut-up, or mashed. Make 1/2 of your plate fruits and vegetables.  Grains. All foods made from grains are part of the Grains Group. These include wheat, rice, oats, cornmeal, and barley. Grains are often used to make foods such as bread, pasta, oatmeal, cereal, tortillas, and grits. Grains should be no more than 1/4 of your plate. At least half of your grains should be whole grains.  Protein. This group includes meat, poultry, seafood, beans and peas, eggs, processed soy products (such as tofu), nuts (including nut butters), and seeds. Make protein choices no more than 1/4 of your plate. Meat and poultry choices should be lean or low fat.  Dairy. The Dairy Group includes all fluid milk products and foods made from milk that contain calcium, such as yogurt and cheese. (Foods that have little calcium, such as cream, butter, and cream cheese, are not  part of this group.) Most dairy choices should be low-fat or fat-free.  Oils. Oils aren't a food group, but they do contain essential nutrients. However it's important to watch your intake of oils. These are fats that are liquid at room temperature. They include canola, corn, olive, soybean, vegetable, and sunflower oil. Foods that are mainly oil include mayonnaise, certain salad dressings, and soft margarines. You likely already get your daily oil allowance from the foods you eat.  Things to limit  Eating healthy also means limiting these things in your diet:  Salt (sodium). Many processed foods have a lot of sodium. To keep sodium intake down, eat fresh vegetables, meats, poultry, and seafood when possible. Purchase low-sodium, reduced-sodium, or no-salt-added food products at the store. And don't add salt to your meals at home. Instead, season them with herbs and spices such as dill, oregano, cumin, and paprika. Or try adding flavor with lemon or lime zest and juice.  Saturated fat. Saturated fats are most often found in animal products such as beef, pork, and chicken. They are often solid at room temperature, such as butter. To reduce your saturated fat intake, choose leaner cuts of meat and poultry. And try healthier cooking methods such as grilling, broiling, roasting, or baking. For a simple lower-fat swap, use plain nonfat yogurt instead of mayonnaise when making potato salad or macaroni salad.  Added sugars. These are sugars added to foods. They are in foods such as ice cream, candy, soda, fruit drinks, sports drinks, energy drinks, cookies, pastries, jams, and syrups. Cut down on added sugars by sharing sweet treats with a family member or friend. You can also choose fruit for dessert, and drink water or other unsweetened beverages.  Yillio last reviewed this educational content on 6/1/2020 2000-2022 [employer]. All rights reserved. This information is not intended as a substitute for  professional medical care. Always follow your healthcare professional's instructions.

## 2023-03-30 NOTE — LETTER
March 30, 2023      Eloisa KAYLEN Carin  [address] [place] [id number]        Dear ,    We are writing to inform you of your test results.    Labs are stable.   Continue current medications.     Electronically signed by:  Chris Fitch MD  on March 30, 2023     Resulted Orders   Hemoglobin A1c   Result Value Ref Range    Hemoglobin A1C 5.5 4.0 - 6.2 %   CBC with platelets   Result Value Ref Range    WBC Count 6.5 4.0 - 11.0 10e3/uL    RBC Count 4.39 3.80 - 5.20 10e6/uL    Hemoglobin 12.8 11.7 - 15.7 g/dL    Hematocrit 39.1 35.0 - 47.0 %    MCV 89 78 - 100 fL    MCH 29.2 26.5 - 33.0 pg    MCHC 32.7 31.5 - 36.5 g/dL    RDW 13.2 10.0 - 15.0 %    Platelet Count 218 150 - 450 10e3/uL   Lipid Profile   Result Value Ref Range    Cholesterol 173 <200 mg/dL    Triglycerides 101 <150 mg/dL    Direct Measure HDL 63 >=50 mg/dL    LDL Cholesterol Calculated 90 <=100 mg/dL    Non HDL Cholesterol 110 <130 mg/dL    Narrative    Cholesterol  Desirable:  <200 mg/dL    Triglycerides  Normal:  Less than 150 mg/dL  Borderline High:  150-199 mg/dL  High:  200-499 mg/dL  Very High:  Greater than or equal to 500 mg/dL    Direct Measure HDL  Female:  Greater than or equal to 50 mg/dL   Male:  Greater than or equal to 40 mg/dL    LDL Cholesterol  Desirable:  <100mg/dL  Above Desirable:  100-129 mg/dL   Borderline High:  130-159 mg/dL   High:  160-189 mg/dL   Very High:  >= 190 mg/dL    Non HDL Cholesterol  Desirable:  130 mg/dL  Above Desirable:  130-159 mg/dL  Borderline High:  160-189 mg/dL  High:  190-219 mg/dL  Very High:  Greater than or equal to 220 mg/dL   Comprehensive metabolic panel   Result Value Ref Range    Sodium 141 136 - 145 mmol/L    Potassium 4.3 3.4 - 5.3 mmol/L    Chloride 105 98 - 107 mmol/L    Carbon Dioxide (CO2) 30 (H) 22 - 29 mmol/L    Anion Gap 6 (L) 7 - 15 mmol/L    Urea Nitrogen 16.9 8.0 - 23.0 mg/dL    Creatinine 0.80 0.51 - 0.95 mg/dL    Calcium 9.0 8.8 - 10.2 mg/dL    Glucose 91 70 -  99 mg/dL    Alkaline Phosphatase 54 35 - 104 U/L    AST 17 10 - 35 U/L    ALT 12 10 - 35 U/L    Protein Total 6.3 (L) 6.4 - 8.3 g/dL    Albumin 3.8 3.5 - 5.2 g/dL    Bilirubin Total 0.3 <=1.2 mg/dL    GFR Estimate 81 >60 mL/min/1.73m2      Comment:      eGFR calculated using 2021 CKD-EPI equation.       If you have any questions or concerns, please call the clinic at the number listed above.       Sincerely,      Chris Fitch MD

## 2023-03-30 NOTE — NURSING NOTE
"Chief Complaint   Patient presents with     Medicare Wellness Visit         Initial BP (!) 160/106 (BP Location: Right arm, Patient Position: Sitting, Cuff Size: Adult Regular)   Pulse 58   Temp 97.2  F (36.2  C) (Temporal)   Resp 20   Ht 1.651 m (5' 5\")   Wt 70.9 kg (156 lb 6.4 oz)   LMP  (LMP Unknown)   SpO2 97%   BMI 26.03 kg/m   Estimated body mass index is 26.03 kg/m  as calculated from the following:    Height as of this encounter: 1.651 m (5' 5\").    Weight as of this encounter: 70.9 kg (156 lb 6.4 oz).       FOOD SECURITY SCREENING QUESTIONS:    The next two questions are to help us understand your food security.  If you are feeling you need any assistance in this area, we have resources available to support you today.    Hunger Vital Signs:  Within the past 12 months we worried whether our food would run out before we got money to buy more. Never  Within the past 12 months the food we bought just didn't last and we didn't have money to get more. Never    Advance Care Directive on file? no      Medication reconciliation complete.      David Reyna,on 3/30/2023 at 8:49 AM        "

## 2023-03-30 NOTE — PROGRESS NOTES
Assessment & Plan     ICD-10-CM    1. Benign essential hypertension  I10 carvedilol (COREG) 6.25 MG tablet     lisinopril (ZESTRIL) 20 MG tablet     torsemide (DEMADEX) 10 MG tablet     TSH with free T4 reflex     CBC with platelets     Comprehensive metabolic panel     TSH with free T4 reflex     CBC with platelets     Comprehensive metabolic panel      2. Bilateral lower extremity edema  R60.0 torsemide (DEMADEX) 10 MG tablet      3. Familial hyperlipidemia  E78.49 rosuvastatin (CRESTOR) 20 MG tablet     Lipid Profile     Lipid Profile      4. Lymphedema of both lower extremities  I89.0       5. White coat syndrome with hypertension  I10       6. Vitamin B12 deficiency  E53.8 vitamin B-12 (CYANOCOBALAMIN) 2500 MCG sublingual tablet     Vitamin B12     Vitamin B12      7. Vitamin D deficiency  E55.9 vitamin D3 (CHOLECALCIFEROL) 50 mcg (2000 units) tablet     Vitamin D Total     Vitamin D Total      8. Chronic anxiety  F41.9       9. Age-related osteoporosis without current pathological fracture  M81.0       10. Visit for screening mammogram  Z12.31 MA Screening Bilateral w/ David      11. Colon cancer screening  Z12.11 COLOGUARD([employer])      12. Herpes zoster without complication  B02.9       13. Vaccine counseling  Z71.85       14. Encounter for Medicare annual wellness exam  Z00.00       15. Fallen arches  M21.41 Orthopedic  Referral    M21.42       16. Elevated random blood glucose level  R73.09 Hemoglobin A1c     Hemoglobin A1c      Patient presents for Medicare annual wellness visit as well as follow-up multiple issues.    Lower extremity edema.  Ongoing, does have lymphedema.  Start low-dose torsemide 10 mg daily.   -Recommend regular leg wraps on a daily basis for compression stockings, consider knee-high versus thigh-high.    Hypertension. Ongoing. Blood pressure is high today, occasionally high at home. Medication side effects: None. Denies syncope or presyncope.  Continue -lisinopril,  COREG, Add Torsemide.   Medication list reviewed/updated. Refills completed as needed.      Mixed hyperlipidemia.  Ongoing. LDL is at goal: Yes. Triglycerides are at goal: Yes.    Medication side effects reported: None. Continue current medications - Crestor. Medication list reviewed/updated. Refills completed as needed.  Recent Labs   Lab Test 05/27/22  0920 03/30/22  1023   CHOL 155 302*   HDL 46 57   LDL 85 217*   TRIG 119 141       Whitecoat hypertension, see above.  Blood pressure quite elevated initially, generally runs more controlled at home, has noted blood pressures in the 130s, 140s at times.  Start torsemide as noted.    Vitamin D and B12 deficiency.  Has not been taking supplements very regularly.  Restart D and B12 supplement.  Check lab work.    Chronic anxiety, ongoing, currently trying to manage without medications.    Age-related osteoporosis without current pathologic fracture.  DEXA scan is currently up-to-date.    Mammogram and colon cancer screening are due.  She would like to continue with Cologuard, she is due for repeat testing.  Mammogram ordered.    Recent herpes zoster outbreak on the left low back.  Lesions are drying up, some localized pain.  Recommend consideration of shingles vaccination.    Bilateral foot pain, has fallen arches, she would like podiatry referral.  Left foot is quite painful.  Referral placed.    Elevated random glucose, check hemoglobin A1c.    Vaccine counseling completed.  Encouraged annual vaccinations.    Encouraged weight loss and regular exercise.     Return in about 1 year (around 3/30/2024) for Annual Medicare Wellness Visit.    Chris Fitch MD  [facility]     SUBJECTIVE:   Eloisa is a 67 year old who presents for Preventive Visit.  Additional Questions 3/30/2023   Roomed by David Jackson LPN   Accompanied by n/a   Patient has been advised of split billing requirements and indicates understanding: Yes  Are you in the first 12 months of  "your Medicare coverage?  No    Healthy Habits:     In general, how would you rate your overall health?  Good    Frequency of exercise:  2-3 days/week    Duration of exercise:  45-60 minutes    Do you usually eat at least 4 servings of fruit and vegetables a day, include whole grains    & fiber and avoid regularly eating high fat or \"junk\" foods?  No    Taking medications regularly:  Yes    Barriers to taking medications:  Not applicable    Medication side effects:  Not applicable    Ability to successfully perform activities of daily living:  No assistance needed    Home Safety:  No safety concerns identified    Hearing Impairment:  No hearing concerns    In the past 6 months, have you been bothered by leaking of urine?  No    In general, how would you rate your overall mental or emotional health?  Good      PHQ-2 Total Score: 0    Additional concerns today:  Yes      Have you ever done Advance Care Planning? (For example, a Health Directive, POLST, or a discussion with a medical provider or your loved ones about your wishes): No, advance care planning information given to patient to review.  Patient declined advance care planning discussion at this time.     Fall risk  Fallen 2 or more times in the past year?: No  Any fall with injury in the past year?: No    Cognitive Screening   1) Repeat 3 items (Leader, Season, Table)    2) Clock draw: NORMAL  3) 3 item recall: 3 words  Results: 3 items recalled: COGNITIVE IMPAIRMENT LESS LIKELY    Mini-CogTM Copyright ANDREINA Costa. Licensed by the author for use in [facility]; reprinted with permission ([email or web address].[email or web address]). All rights reserved.      Do you have sleep apnea, excessive snoring or daytime drowsiness?: no    Reviewed and updated as needed this visit by clinical staff   Tobacco  Allergies  Meds  Problems  Med Hx  Surg Hx  Fam Hx  Soc   Hx        Reviewed and updated as needed this visit by Provider   Tobacco  Allergies  Meds  Problems  Med Hx  Surg " Hx  Fam Hx         Social History     Tobacco Use     Smoking status: Never     Smokeless tobacco: Never   Substance Use Topics     Alcohol use: No     Alcohol/week: 0.0 standard drinks     Alcohol Use 3/23/2023   Prescreen: >3 drinks/day or >7 drinks/week? Not Applicable     Do you have a current opioid prescription? No  Do you use any other controlled substances or medications that are not prescribed by a provider? None    Current providers sharing in care for this patient include:   Patient Care Team:  Chris Fitch MD as PCP - General (Internal Medicine)  Chris Fitch MD as Assigned PCP  Chris Fitch MD (Internal Medicine)  Nicolette Varela MD as Assigned Surgical Provider    The following health maintenance items are reviewed in Epic and correct as of today:  Health Maintenance   Topic Date Due     ZOSTER IMMUNIZATION (1 of 2) Never done     DTAP/TDAP/TD IMMUNIZATION (2 - Td or Tdap) 02/19/2019     COVID-19 Vaccine (3 - Booster for Pfizer series) 08/12/2021     Pneumococcal Vaccine: 65+ Years (1 - PCV) Never done     MAMMO SCREENING  12/23/2022     COLORECTAL CANCER SCREENING  01/09/2023     MEDICARE ANNUAL WELLNESS VISIT  03/30/2024     FALL RISK ASSESSMENT  03/30/2024     LIPID  03/30/2028     ADVANCE CARE PLANNING  03/30/2028     DEXA  07/30/2036     PHQ-2 (once per calendar year)  Completed     HEPATITIS C SCREENING  Addressed     IPV IMMUNIZATION  Aged Out     MENINGITIS IMMUNIZATION  Aged Out     INFLUENZA VACCINE  Discontinued     Any new diagnosis of family breast, ovarian, or bowel cancer? No    FHS-7: No flowsheet data found.    Mammogram Screening: Recommended mammography every 1-2 years with patient discussion and risk factor consideration  Pertinent mammograms are reviewed under the imaging tab.    Review of Systems   Constitutional: Positive for chills. Negative for fever.   HENT: Positive for congestion. Negative for ear pain, hearing loss and sore throat.    Eyes: Negative for pain and  "visual disturbance.   Respiratory: Positive for cough. Negative for shortness of breath.    Cardiovascular: Positive for peripheral edema. Negative for chest pain and palpitations.   Gastrointestinal: Positive for abdominal pain. Negative for constipation, diarrhea, heartburn, hematochezia and nausea.   Breasts:  Negative for tenderness, breast mass and discharge.   Genitourinary: Positive for frequency. Negative for dysuria, genital sores, hematuria, pelvic pain, urgency and vaginal bleeding.   Musculoskeletal: Positive for arthralgias and myalgias. Negative for joint swelling.   Skin: Positive for rash.   Allergic/Immunologic: Negative for immunocompromised state.   Neurological: Positive for dizziness. Negative for weakness, headaches and paresthesias.   Hematological: Bruises/bleeds easily.   Psychiatric/Behavioral: Negative for mood changes. The patient is nervous/anxious.        OBJECTIVE:   /88 (BP Location: Right arm, Patient Position: Sitting, Cuff Size: Adult Regular)   Pulse 58   Temp 97.2  F (36.2  C) (Temporal)   Resp 20   Ht 1.651 m (5' 5\")   Wt 70.9 kg (156 lb 6.4 oz)   LMP  (LMP Unknown)   SpO2 97%   BMI 26.03 kg/m   Estimated body mass index is 26.03 kg/m  as calculated from the following:    Height as of this encounter: 1.651 m (5' 5\").    Weight as of this encounter: 70.9 kg (156 lb 6.4 oz).  Physical Exam  Constitutional:       General: She is not in acute distress.     Appearance: She is well-developed. She is not diaphoretic.   HENT:      Head: Normocephalic and atraumatic.   Eyes:      General: No scleral icterus.     Conjunctiva/sclera: Conjunctivae normal.   Neck:      Vascular: No carotid bruit.   Cardiovascular:      Rate and Rhythm: Normal rate and regular rhythm.      Pulses: Normal pulses.   Pulmonary:      Effort: Pulmonary effort is normal.      Breath sounds: Normal breath sounds.   Abdominal:      Palpations: Abdomen is soft.      Tenderness: There is no abdominal " tenderness.   Musculoskeletal:         General: No deformity.      Cervical back: Neck supple.      Right lower le+ Pitting Edema present.      Left lower le+ Pitting Edema present.      Comments: + lymphedema bilateral legs + pitting edema   Lymphadenopathy:      Cervical: No cervical adenopathy.   Skin:     General: Skin is warm and dry.      Findings: No rash.   Neurological:      General: No focal deficit present.      Mental Status: She is alert. Mental status is at baseline.   Psychiatric:         Mood and Affect: Mood normal.         Behavior: Behavior normal.       Diagnostic Test Results:  Labs reviewed in Epic  Results for orders placed or performed in visit on 23   TSH with free T4 reflex     Status: Normal   Result Value Ref Range    TSH 2.50 0.30 - 4.20 uIU/mL   Hemoglobin A1c     Status: Normal   Result Value Ref Range    Hemoglobin A1C 5.5 4.0 - 6.2 %   CBC with platelets     Status: Normal   Result Value Ref Range    WBC Count 6.5 4.0 - 11.0 10e3/uL    RBC Count 4.39 3.80 - 5.20 10e6/uL    Hemoglobin 12.8 11.7 - 15.7 g/dL    Hematocrit 39.1 35.0 - 47.0 %    MCV 89 78 - 100 fL    MCH 29.2 26.5 - 33.0 pg    MCHC 32.7 31.5 - 36.5 g/dL    RDW 13.2 10.0 - 15.0 %    Platelet Count 218 150 - 450 10e3/uL   Lipid Profile     Status: Normal   Result Value Ref Range    Cholesterol 173 <200 mg/dL    Triglycerides 101 <150 mg/dL    Direct Measure HDL 63 >=50 mg/dL    LDL Cholesterol Calculated 90 <=100 mg/dL    Non HDL Cholesterol 110 <130 mg/dL    Narrative    Cholesterol  Desirable:  <200 mg/dL    Triglycerides  Normal:  Less than 150 mg/dL  Borderline High:  150-199 mg/dL  High:  200-499 mg/dL  Very High:  Greater than or equal to 500 mg/dL    Direct Measure HDL  Female:  Greater than or equal to 50 mg/dL   Male:  Greater than or equal to 40 mg/dL    LDL Cholesterol  Desirable:  <100mg/dL  Above Desirable:  100-129 mg/dL   Borderline High:  130-159 mg/dL   High:  160-189 mg/dL   Very High:  >=  "190 mg/dL    Non HDL Cholesterol  Desirable:  130 mg/dL  Above Desirable:  130-159 mg/dL  Borderline High:  160-189 mg/dL  High:  190-219 mg/dL  Very High:  Greater than or equal to 220 mg/dL   Comprehensive metabolic panel     Status: Abnormal   Result Value Ref Range    Sodium 141 136 - 145 mmol/L    Potassium 4.3 3.4 - 5.3 mmol/L    Chloride 105 98 - 107 mmol/L    Carbon Dioxide (CO2) 30 (H) 22 - 29 mmol/L    Anion Gap 6 (L) 7 - 15 mmol/L    Urea Nitrogen 16.9 8.0 - 23.0 mg/dL    Creatinine 0.80 0.51 - 0.95 mg/dL    Calcium 9.0 8.8 - 10.2 mg/dL    Glucose 91 70 - 99 mg/dL    Alkaline Phosphatase 54 35 - 104 U/L    AST 17 10 - 35 U/L    ALT 12 10 - 35 U/L    Protein Total 6.3 (L) 6.4 - 8.3 g/dL    Albumin 3.8 3.5 - 5.2 g/dL    Bilirubin Total 0.3 <=1.2 mg/dL    GFR Estimate 81 >60 mL/min/1.73m2   Vitamin B12     Status: Normal   Result Value Ref Range    Vitamin B12 358 232 - 1,245 pg/mL      Metabolic panel is essentially normal.  Creatinine is at baseline.  Glucose is normal.  Cholesterol levels are all at goal.  Hemoglobin A1c is normal.   CBC is normal    Patient has been advised of split billing requirements and indicates understanding: Yes      COUNSELING:  Reviewed preventive health counseling, as reflected in patient instructions  Special attention given to:       Regular exercise       Healthy diet/nutrition       Vision screening       Hearing screening       Dental care       Bladder control       Fall risk prevention       Immunizations    BMI:   Estimated body mass index is 26.03 kg/m  as calculated from the following:    Height as of this encounter: 1.651 m (5' 5\").    Weight as of this encounter: 70.9 kg (156 lb 6.4 oz).         She reports that she has never smoked. She has never used smokeless tobacco.      Appropriate preventive services were discussed with this patient, including applicable screening as appropriate for cardiovascular disease, diabetes, osteopenia/osteoporosis, and glaucoma.  As " appropriate for age/gender, discussed screening for colorectal cancer, prostate cancer, breast cancer, and cervical cancer. Checklist reviewing preventive services available has been given to the patient.    Reviewed patients plan of care and provided an AVS. The Complex Care Plan (for patients with higher acuity and needing more deliberate coordination of services) for Eloisa meets the Care Plan requirement. This Care Plan has been established and reviewed with the Patient.          Chris Fitch MD  [facility]    Identified Health Risks:    I have reviewed Opioid Use Disorder and Substance Use Disorder risk factors and made any needed referrals.       The patient was counseled and encouraged to consider modifying their diet and eating habits. She was provided with information on recommended healthy diet options.

## 2023-03-31 LAB — DEPRECATED CALCIDIOL+CALCIFEROL SERPL-MC: 44 UG/L (ref 20–75)

## 2023-04-02 ENCOUNTER — HEALTH MAINTENANCE LETTER (OUTPATIENT)
Age: 68
End: 2023-04-02

## 2023-04-13 ENCOUNTER — LAB (OUTPATIENT)
Dept: INTERNAL MEDICINE | Facility: OTHER | Age: 68
End: 2023-04-13
Payer: COMMERCIAL

## 2023-04-13 DIAGNOSIS — Z12.11 COLON CANCER SCREENING: ICD-10-CM

## 2023-04-29 ENCOUNTER — ALLIED HEALTH/NURSE VISIT (OUTPATIENT)
Dept: FAMILY MEDICINE | Facility: OTHER | Age: 68
End: 2023-04-29
Attending: NURSE PRACTITIONER
Payer: COMMERCIAL

## 2023-04-29 ENCOUNTER — MYC MEDICAL ADVICE (OUTPATIENT)
Dept: INTERNAL MEDICINE | Facility: OTHER | Age: 68
End: 2023-04-29

## 2023-04-29 DIAGNOSIS — R05.9 COUGH: Primary | ICD-10-CM

## 2023-04-29 LAB — SARS-COV-2 RNA RESP QL NAA+PROBE: POSITIVE

## 2023-04-29 PROCEDURE — C9803 HOPD COVID-19 SPEC COLLECT: HCPCS

## 2023-04-29 PROCEDURE — U0005 INFEC AGEN DETEC AMPLI PROBE: HCPCS | Mod: ZL

## 2023-04-30 ENCOUNTER — TELEPHONE (OUTPATIENT)
Dept: NURSING | Facility: CLINIC | Age: 68
End: 2023-04-30
Payer: COMMERCIAL

## 2023-04-30 LAB — NONINV COLON CA DNA+OCC BLD SCRN STL QL: POSITIVE

## 2023-04-30 NOTE — TELEPHONE ENCOUNTER
Patient classified as COVID treatment eligible by Epic high risk algorithm:  Yes    Coronavirus (COVID-19) Notification    Reason for call  Notify of POSITIVE COVID-19 lab result, assess symptoms,  review [facility] recommendations    Lab Result   Lab test for 2019-nCoV rRt-PCR or SARS-COV-2 PCR  Oropharyngeal AND/OR nasopharyngeal swabs were POSITIVE for 2019-nCoV RNA [OR] SARS-COV-2 RNA (COVID-19) RNA     We have been unable to reach patient by phone at this time to notify of their Positive COVID-19 result.    Left voicemail message requesting a call back to [phone] [facility] for results.        A Positive COVID-19 letter will be sent via finalsite or the mail.    Daisy Castillo

## 2023-05-01 ENCOUNTER — MYC MEDICAL ADVICE (OUTPATIENT)
Dept: INTERNAL MEDICINE | Facility: OTHER | Age: 68
End: 2023-05-01
Payer: COMMERCIAL

## 2023-05-01 ENCOUNTER — TELEPHONE (OUTPATIENT)
Dept: INTERNAL MEDICINE | Facility: OTHER | Age: 68
End: 2023-05-01
Payer: COMMERCIAL

## 2023-05-01 DIAGNOSIS — R19.5 POSITIVE COLORECTAL CANCER SCREENING USING COLOGUARD TEST: Primary | ICD-10-CM

## 2023-05-01 NOTE — TELEPHONE ENCOUNTER
----- Message from Chris Fitch MD sent at 5/1/2023  8:20 AM CDT -----  Clinic Nursing - Please call patient with results -   positive Cologuard, blood noted in the stool - colonoscopy required for further testing - they will call with date/time of appointment.       Chris Fitch MD

## 2023-05-01 NOTE — TELEPHONE ENCOUNTER
Have you been diagnosed with COVID-19 in the last two days? Yes 4/29 Saturday    Did your symptoms start in the last five days? Yes 4/27    Are you a patient at risk of being very sick from Covid-19 because you are age 65 or older? Yes    Member of the BIPOC (Black, Indigenous, and people of color) community? No    Do you have Cancer, Chronic Liver Disease, Chronic Lung Disease, Chronic Kidney Disease, Cystic Fibrosis, Dementia, or other neurological conditions; Diabetes, Disabilities, Heart conditions, Hypertension, HIV, Immunocompromised, Mental Health Conditions, Overweight ( BMI >25), physically inactivity, pregnancy, kathrine cell disease, thalassemia, smoking ( current or former), Solid organ or blood cell transplant, Stroke Substance use disorders, or Tuberculosis?  Yes     Patient feeling like she's improving.  No fever since Saturday.  Patient does not want an appt for anti-virals.  She knows that this is the last day she would be a candidate.     Kajal Roman RN on 5/1/2023 at 10:15 AM

## 2023-05-01 NOTE — TELEPHONE ENCOUNTER
After verifying patient's name and date of birth, patient was given the below information.   Patient understood.  She did have a question wondering if her colon surgery in December could be a cause of the positive?   Will ask Dr. Fitch.  Theodora Joe LPN....5/1/2023 8:29 AM

## 2023-05-02 NOTE — TELEPHONE ENCOUNTER
See telephone note from 5/1 (same day) in regards to patient being called with results.    Routed telephone note to Dr. Fitch with patient's additional question regarding her colon surgery in December.     Kajal Roman RN on 5/2/2023 at 11:19 AM

## 2023-05-31 ENCOUNTER — HOSPITAL ENCOUNTER (OUTPATIENT)
Dept: MAMMOGRAPHY | Facility: OTHER | Age: 68
Discharge: HOME | End: 2023-05-31
Attending: INTERNAL MEDICINE | Admitting: INTERNAL MEDICINE
Payer: COMMERCIAL

## 2023-05-31 DIAGNOSIS — Z12.31 VISIT FOR SCREENING MAMMOGRAM: ICD-10-CM

## 2023-05-31 PROCEDURE — 77067 SCR MAMMO BI INCL CAD: CPT

## 2023-08-11 DIAGNOSIS — I10 BENIGN ESSENTIAL HYPERTENSION: ICD-10-CM

## 2023-08-13 DIAGNOSIS — I10 BENIGN ESSENTIAL HYPERTENSION: ICD-10-CM

## 2023-08-15 RX ORDER — LISINOPRIL 20 MG/1
TABLET ORAL
Qty: 180 TABLET | Refills: 4 | Status: SHIPPED | OUTPATIENT
Start: 2023-08-15 | End: 2024-03-11

## 2023-08-16 RX ORDER — CARVEDILOL 6.25 MG/1
TABLET ORAL
Qty: 90 TABLET | Refills: 1 | Status: SHIPPED | OUTPATIENT
Start: 2023-08-16 | End: 2024-03-07

## 2023-09-19 ENCOUNTER — NURSE TRIAGE (OUTPATIENT)
Dept: INTERNAL MEDICINE | Facility: OTHER | Age: 68
End: 2023-09-19
Payer: COMMERCIAL

## 2023-09-19 NOTE — TELEPHONE ENCOUNTER
If her blood pressures are that high-even with her last reading she can still take 10 mg in the evening. She needs to get an appointment to discuss her blood pressures and medications- or present to ER if she is having those high blood pressures as that is considered hypertensive crisis.

## 2023-09-19 NOTE — TELEPHONE ENCOUNTER
"Patient states, \"I feel fine, I don't know what else to do.\" Patient states she does not want to recheck her BP right now because she hasn't \"calmed down\" yet. Patient states she will return call once she calms down and is willing to retake her BP. Elvira Hutton RN on 9/19/2023 at 8:18 AM    "

## 2023-09-19 NOTE — TELEPHONE ENCOUNTER
"AUNDREA Benites, NP -   Patient accidentally took lisinopril 40 mg early this morning instead of 20 mg. She is to take 10 mg at 5:00 PM and is wondering if she should hold it or if it is okay to take it as usual. Also, please see previous notes and triage with regard to BP readings.   Elvira Hutton RN on 9/19/2023 at 12:18 PM    Patient calls back as requested to report her BP is 146/78 after working out at the [facility]. Continues to deny symptoms. Patient states, \"My blood pressure does this, we've been try to get it stable forever. It goes up and down and up and down.\"   "

## 2023-09-19 NOTE — TELEPHONE ENCOUNTER
BP is 124/72 currently. Patient advised of below. She has an annual visit scheduled with Dr. Fitch 4/3/23. Elvira Hutton RN on 9/19/2023 at 3:53 PM

## 2023-09-19 NOTE — TELEPHONE ENCOUNTER
Patient takes lisinopril 20 mg AM with carvedilol 6.25 mg 1/2 tablet and at 5 PM lisinopril 20 mg AM with carvedilol 6.25 mg 1/2 tablet. This morning accidentally took lisinopril 20 mg at 0600 and 0720. BP is 240/112 retake 5 minutes later is 217/97. 237/98. Patient is asymptomatic. Discussed her readings are considered a hypertensive crisis and that I could certainly ask a provider about the medication error, but that her BP readings are priority and we recommend ER evaluation. Patient opts to call back in 15 minutes. Elvira Hutton RN on 9/19/2023 at 8:19 AM    Reason for Disposition   DOUBLE DOSE (an extra dose or lesser amount) of prescription drug and NO symptoms  (Exception: Double dose of antibiotic can be handled with reassurance and self-care at home disposition if asymptomatic.)   Systolic BP >= 200 OR Diastolic >= 120 and having NO cardiac or neurologic symptoms    Additional Information   Negative: SEVERE difficulty breathing (e.g., struggling for each breath, speaks in single words)   Negative: Bluish (or gray) lips or face   Negative: Slow, shallow and weak breathing   Negative: Difficult to awaken or acting confused (e.g., disoriented, slurred speech)   Negative: Seizure   Negative: Shock suspected (e.g., cold/pale/clammy skin, too weak to stand, low BP, rapid pulse)   Negative: Suicide attempt, known or suspected   Negative: Intentional overdose and suicidal thoughts or ideas   Negative: Sounds like a life-threatening emergency to the triager   Negative: Chemical in the eye   Negative: Chemical on the skin   Negative: Epinephrine (such as from Epi-Pen) accidental injection   Negative: CAUSTIC ACID OR ALKALI ingestion (e.g., toilet , drain , lye, Clinitest tablets, ammonia, bleaches) AND any symptoms (e.g., difficulty breathing, drooling, mouth pain, sore throat, stridor)   Negative: HYDROCARBON PRODUCT ingestion (e.g., kerosene, gasoline, benzene, furniture polish, lighter fluid) AND  any symptoms (e.g., coughing, difficulty breathing, vomiting)   Negative: Poison Center advised patient to go to ED and caller seeking second opinion   Negative: Patient sounds very sick or weak to the triager   Negative: CAUSTIC ACID OR ALKALI ingestion (e.g., toilet , drain , lye, Clinitest tablets, ammonia, bleaches) AND NO symptoms   Negative: HYDROCARBON PRODUCT ingestion (e.g., kerosene, gasoline, benzene, furniture polish, lighter fluid) AND NO symptoms   Negative: Carbon monoxide exposure suspected   Negative: MORE THAN A DOUBLE DOSE of a prescription or over-the-counter (OTC) drug   Negative: DOUBLE DOSE (an extra dose or lesser amount) of prescription drug and any symptoms (e.g., dizziness, nausea, pain, sleepiness)   Negative: DOUBLE DOSE (an extra dose or lesser amount) of over-the-counter (OTC) drug and any symptoms (e.g., dizziness, nausea, pain, sleepiness)   Negative: Took another person's prescription drug   Negative: Mercury spill (e.g., broken glass thermometer, broken spiral CFL lightbulb)   Negative: Wild mushroom ingestion, questions about   Negative: All other POTENTIALLY POISONOUS SUBSTANCES (e.g., nearly all chemicals, plants) (Exception: Known harmless substances or asymptomatic double dose of OTC drug.)   Negative: Triager unable to answer question   Negative: Caller provides unclear information about type or amount of substance   Negative: Sounds like a life-threatening emergency to the triager   Negative: Symptom is main concern (e.g., headache, chest pain)   Negative: Low blood pressure is main concern   Negative: Systolic BP >= 160 OR Diastolic >= 100, and any cardiac (e.g., breathing difficulty, chest pain) or neurologic symptoms (e.g., new-onset blurred or double vision)   Negative: Pregnant 20 or more weeks (or postpartum < 6 weeks) with new hand or face swelling   Negative: Pregnant 20 or more weeks (or postpartum < 6 weeks) and Systolic BP >= 160 OR Diastolic >=  "110   Negative: Patient sounds very sick or weak to the triager    Answer Assessment - Initial Assessment Questions      Answer Assessment - Initial Assessment Questions  1. BLOOD PRESSURE: \"What is the blood pressure?\" \"Did you take at least two measurements 5 minutes apart?\"      240/112 at 0743; 217/97 at 0747  2. ONSET: \"When did you take your blood pressure?\"      As above  3. HOW: \"How did you take your blood pressure?\" (e.g., automatic home BP monitor, visiting nurse)      Home cuff  4. HISTORY: \"Do you have a history of high blood pressure?\"      yes  5. MEDICINES: \"Are you taking any medicines for blood pressure?\" \"Have you missed any doses recently?\"      Lisinopril and carvedilol  6. OTHER SYMPTOMS: \"Do you have any symptoms?\" (e.g., blurred vision, chest pain, difficulty breathing, headache, weakness)      Feeling very nervous  7. PREGNANCY: \"Is there any chance you are pregnant?\" \"When was your last menstrual period?\"      no    Protocols used: Poisoning-A-OH, Blood Pressure - High-A-OH      "

## 2023-10-10 ENCOUNTER — MYC MEDICAL ADVICE (OUTPATIENT)
Dept: INTERNAL MEDICINE | Facility: OTHER | Age: 68
End: 2023-10-10
Payer: COMMERCIAL

## 2023-10-10 DIAGNOSIS — E53.8 VITAMIN B12 DEFICIENCY: ICD-10-CM

## 2023-10-10 RX ORDER — CYANOCOBALAMIN (VITAMIN B-12) 2500 MCG
2500 TABLET, SUBLINGUAL SUBLINGUAL
Qty: 100 TABLET | Refills: 4 | Status: SHIPPED | OUTPATIENT
Start: 2023-10-11 | End: 2024-03-11

## 2023-10-20 ENCOUNTER — HOSPITAL ENCOUNTER (OUTPATIENT)
Facility: OTHER | Age: 68
End: 2023-10-20
Attending: SURGERY | Admitting: SURGERY
Payer: COMMERCIAL

## 2023-10-20 DIAGNOSIS — Z12.11 ENCOUNTER FOR SCREENING COLONOSCOPY: Primary | ICD-10-CM

## 2023-10-20 NOTE — TELEPHONE ENCOUNTER
Screening Questions for the Scheduling of Screening Colonoscopies   (If Colonoscopy is diagnostic, Provider should review the chart before scheduling.)  Are you younger than 50 or older than 80?  NO  Do you take aspirin or fish oil?  NO (if yes, tell patient to stop 1 week prior to Colonoscopy)  Do you take warfarin (Coumadin), clopidogrel (Plavix), apixaban (Eliquis), dabigatram (Pradaxa), rivaroxaban (Xarelto) or any blood thinner? NO  Do you take Ozempic? NO  Do you use oxygen at home?  NO  Do you have kidney disease? NO  Are you on dialysis? NO  Have you had a stroke or heart attack in the last year? NO  Have you had a stent in your heart or any blood vessel in the last year? NO  Have you had a transplant of any organ? NO  Have you had a colonoscopy or upper endoscopy (EGD) before? YES         When?  2013  Date of scheduled Colonoscopy. 05/13/2024  Provider SERGEI MARTINEZ

## 2023-10-30 RX ORDER — BISACODYL 5 MG/1
TABLET, DELAYED RELEASE ORAL
Qty: 2 TABLET | Refills: 0 | Status: SHIPPED | OUTPATIENT
Start: 2024-05-06 | End: 2024-04-03

## 2023-10-30 RX ORDER — POLYETHYLENE GLYCOL 3350, SODIUM CHLORIDE, SODIUM BICARBONATE, POTASSIUM CHLORIDE 420; 11.2; 5.72; 1.48 G/4L; G/4L; G/4L; G/4L
4000 POWDER, FOR SOLUTION ORAL ONCE
Qty: 4000 ML | Refills: 0 | Status: SHIPPED | OUTPATIENT
Start: 2024-05-06 | End: 2024-04-03

## 2023-11-03 ENCOUNTER — HOSPITAL ENCOUNTER (OUTPATIENT)
Dept: ULTRASOUND IMAGING | Facility: OTHER | Age: 68
Discharge: HOME | End: 2023-11-03
Attending: NURSE PRACTITIONER
Payer: COMMERCIAL

## 2023-11-03 ENCOUNTER — OFFICE VISIT (OUTPATIENT)
Dept: FAMILY MEDICINE | Facility: OTHER | Age: 68
End: 2023-11-03
Attending: NURSE PRACTITIONER
Payer: COMMERCIAL

## 2023-11-03 VITALS
HEART RATE: 68 BPM | OXYGEN SATURATION: 98 % | DIASTOLIC BLOOD PRESSURE: 100 MMHG | TEMPERATURE: 98.6 F | WEIGHT: 162.8 LBS | BODY MASS INDEX: 27.12 KG/M2 | SYSTOLIC BLOOD PRESSURE: 180 MMHG | RESPIRATION RATE: 16 BRPM | HEIGHT: 65 IN

## 2023-11-03 DIAGNOSIS — I10 WHITE COAT SYNDROME WITH HYPERTENSION: ICD-10-CM

## 2023-11-03 DIAGNOSIS — I89.0 LYMPHEDEMA OF BOTH LOWER EXTREMITIES: ICD-10-CM

## 2023-11-03 DIAGNOSIS — M79.89 LEFT LEG SWELLING: ICD-10-CM

## 2023-11-03 DIAGNOSIS — R60.0 BILATERAL LOWER EXTREMITY EDEMA: ICD-10-CM

## 2023-11-03 DIAGNOSIS — L03.116 LEFT LEG CELLULITIS: Primary | ICD-10-CM

## 2023-11-03 PROCEDURE — G0463 HOSPITAL OUTPT CLINIC VISIT: HCPCS | Mod: 25

## 2023-11-03 PROCEDURE — 99214 OFFICE O/P EST MOD 30 MIN: CPT | Performed by: NURSE PRACTITIONER

## 2023-11-03 PROCEDURE — 93971 EXTREMITY STUDY: CPT | Mod: LT

## 2023-11-03 RX ORDER — CEPHALEXIN 500 MG/1
500 CAPSULE ORAL 2 TIMES DAILY
Qty: 20 CAPSULE | Refills: 0 | Status: SHIPPED | OUTPATIENT
Start: 2023-11-03 | End: 2023-11-13

## 2023-11-03 ASSESSMENT — PAIN SCALES - GENERAL: PAINLEVEL: MODERATE PAIN (5)

## 2023-11-03 NOTE — PROGRESS NOTES
"  Assessment & Plan   Problem List Items Addressed This Visit          Circulatory    White coat syndrome with hypertension       Musculoskeletal and Integumentary    Lymphedema of both lower extremities    Bilateral lower extremity edema     Other Visit Diagnoses       Left leg cellulitis    -  Primary    Relevant Medications    cephALEXin (KEFLEX) 500 MG capsule    Left leg swelling        Relevant Orders    US Lower Extremity Venous Duplex Left (Completed)        Symptoms concerning for DVT, ultrasound was completed which showed no DVT present.  At this time we will treat her for cellulitis to left lower leg, Keflex twice daily for 10 days.  Recommend warm compresses, elevation and ongoing symptomatic management.  Reviewed signs and symptoms that would warrant urgent follow-up.    Blood pressure is elevated, this is chronically elevated in clinic due to whitecoat syndrome, home blood pressures have previously been stable.    Ordering of each unique test  Prescription drug management         BMI:   Estimated body mass index is 27.09 kg/m  as calculated from the following:    Height as of this encounter: 1.651 m (5' 5\").    Weight as of this encounter: 73.8 kg (162 lb 12.8 oz).           No follow-ups on file.    [facility]AN [facility]   Eloisa is a 68 year old, presenting for the following health issues:  Edema      History of Present Illness       Reason for visit:  Knee red swelling  Symptom onset:  3-7 days ago  Symptoms include:  Redness swelling pain  Symptom intensity:  Moderate  Symptom progression:  Staying the same  Had these symptoms before:  Yes  Has tried/received treatment for these symptoms:  Yes  Previous treatment was successful:  Yes  Prior treatment description:  Antibiotics  What makes it worse:  No  What makes it better:  Staying off knee    She eats 2-3 servings of fruits and vegetables daily.She consumes 0 sweetened beverage(s) daily.She " "exercises with enough effort to increase her heart rate 30 to 60 minutes per day.  She exercises with enough effort to increase her heart rate 3 or less days per week.   She is taking medications regularly.     She presents to clinic today for evaluation of left leg redness, warmth and swelling.  She reports this been present for the past couple weeks.  She does have chronic lower extremity swelling related to lymphedema.  She wears compression stockings daily.  Over the past 1 to 2 weeks she noted symptoms worsening on the left inner knee.  Today she does feel the redness is slightly better.  She has been doing a lot of elevation.  Denies any fevers.  Not having any body aches or chills.  She does feel that the pain worsens when she is exercising, she continues to work out 3 days a week.      Review of Systems   See above      Objective    BP (!) 180/100   Pulse 68   Temp 98.6  F (37  C)   Resp 16   Ht 1.651 m (5' 5\")   Wt 73.8 kg (162 lb 12.8 oz)   LMP  (LMP Unknown)   SpO2 98%   BMI 27.09 kg/m    Body mass index is 27.09 kg/m .  Physical Exam   GENERAL: healthy, alert and no distress  EYES: Eyes grossly normal to inspection  CV: regular rate and rhythm, normal S1 S2, mild swelling to bilateral LE  MS: no gross musculoskeletal defects noted, left inner knee tender to touch-note firm linear abnormality that is tender. Moderate redness measuring approx 18 cm  SKIN: no suspicious lesions or rashes  PSYCH: mentation appears normal, affect normal/bright    Results for orders placed or performed in visit on 11/03/23   US Lower Extremity Venous Duplex Left     Status: None    Narrative    Procedure: US LOWER EXTREMITY VENOUS DUPLEX LEFT    HISTORY:  Left leg swelling.    TECHNIQUE: Grayscale, color Doppler and compression views of the deep  venous structures of the left lower extremity.    COMPARISON: None.    FINDINGS:    Interrogation of the deep venous structures from the left common  femoral vein through the " veins of the proximal calf demonstrate normal  grayscale appearance, compressibility and augmentable color Doppler  flow.      Impression    IMPRESSION:     No evidence of left lower extremity DVT.      MISSY WEISS MD         SYSTEM ID:  [id number]

## 2023-11-17 ENCOUNTER — MYC MEDICAL ADVICE (OUTPATIENT)
Dept: INTERNAL MEDICINE | Facility: OTHER | Age: 68
End: 2023-11-17
Payer: COMMERCIAL

## 2023-11-17 DIAGNOSIS — I89.0 LYMPHEDEMA OF BOTH LOWER EXTREMITIES: Primary | ICD-10-CM

## 2024-01-09 ENCOUNTER — THERAPY VISIT (OUTPATIENT)
Dept: PHYSICAL THERAPY | Facility: OTHER | Age: 69
End: 2024-01-09
Payer: COMMERCIAL

## 2024-01-09 DIAGNOSIS — I89.0 LYMPHEDEMA OF BOTH LOWER EXTREMITIES: ICD-10-CM

## 2024-01-09 PROCEDURE — 97140 MANUAL THERAPY 1/> REGIONS: CPT | Mod: GP

## 2024-01-09 PROCEDURE — 97161 PT EVAL LOW COMPLEX 20 MIN: CPT | Mod: GP

## 2024-01-09 NOTE — PROGRESS NOTES
"PHYSICAL THERAPY EVALUATION  Type of Visit: Evaluation    See electronic medical record for Abuse and Falls Screening details.    Subjective       Presenting condition or subjective complaint: Lymphadema in my legs  Patient referred to PT due to bilateral lower extremity lymphedema. Recent bout of cellulitis in left leg with worsening swelling. Continued \"redness\" to the skin of left leg. Increased swelling has caused discomfort in the left leg, takes tylenol to alleviate this. Patient wears thigh high compression stockings daily, 3-40 mmHg; exercises regularly, uses lotion on legs, completes MLD at times. History of cervcial cancer in 1989 with surgery and lymph node resection; initally underwent CDT with compression wrapping MLD exercise, helped to manage swelling for a long time. In 2022, had emergency colon surgery due to adhesions/restrictions from scar tissue. Since that surgery, swelling has worsened. Cellulitis this fall added to it and made her concerned and wanted to see PT again as it has been many years. Current stockings are brand new. Brand and model unknown. Swelling and ache does lessen with elevation, exercise and in the morning hours.   Date of onset: 11/17/23    Relevant medical history: Bladder or bowel problems; Change in skin color; Cold or hot arm or leg   Dates & types of surgery: Cervical cancer 29 years ago lymph nodes removed last year Dec removal of scar tissue on left side it was wrapped around intestine    Prior diagnostic imaging/testing results:       Prior therapy history for the same diagnosis, illness or injury: Yes I was 36 when I had cancer lymph nodes taken out  Lymphadema TP at [facility]    Prior Level of Function  Transfers: Independent  Ambulation: Independent  ADL: Independent    Living Environment  Social support: Alone   Type of home: Apartment/condo; 1 level   Stairs to enter the home: No       Ramp: Yes   Stairs inside the home: No       Help at home: None  Equipment owned: "       Employment: Not Applicable    Hobbies/Interests: Gardening work out  volunteer    Patient goals for therapy: Wear my jeans and boots    Pain assessment: Pain present  Location: left leg/Ratin-9/10     Objective       EDEMA EVALUATION  Additional history:  Body part affected by edema: lower abdomen, bilateral lower extremities, left great than right  If cancer related, treatment: surgery  If not cancer related, problems with veins or cause of swelling:    Distance able to walk: no issues  Time able to stand: no issues  Sensation problems in hands/feet: No    Edema etiology: Cancer with lymph node dissection, Infection, Surgery    EDEMA  Skin Condition: Dryness, Intact, Non-pitting  Scar: Yes  Location: abdomen  Mobility: yes mobile  Capillary Refill: Symmetrical    Dorsal Pedal Pulse: Symmetrical  Stemmer Sign: +  Ulceration: No    GIRTH MEASUREMENTS: Refer to separate girth measurement flowsheet.     VOLUME LE  Right LE (mL) 8285.07   Left LE (mL) 8186.08   LE Volume Comparison RLE volume greater than LLE volume   % Difference    Head/Neck Volume     Trunk Volume    Genital Volume       RANGE OF MOTION:  left hip and knee limited due to edema    ACTIVITIES OF DAILY LIVING: able to jasmyn doff stockings indep  BED MOBILITY: Independent  TRANSFERS: Independent      Assessment & Plan   CLINICAL IMPRESSIONS  Medical Diagnosis: I89.0 (ICD-10-CM) - Lymphedema of both lower extremities    Treatment Diagnosis: bilateral lower extremity and abdominal edema   Impression/Assessment: Patient is a 68 year old female with edema complaints of lower abdomen and bilateral lower extremities.  The following significant findings have been identified: Pain and Edema. These impairments interfere with their ability to perform self care tasks, recreational activities, household chores, and community mobility as compared to previous level of function.     Clinical Decision Making (Complexity):  Clinical Presentation:  Evolving/Changing  Clinical Presentation Rationale: based on medical and personal factors listed in PT evaluation  Clinical Decision Making (Complexity): Low complexity    PLAN OF CARE  Treatment Interventions:  Interventions: Manual Therapy, Neuromuscular Re-education, Therapeutic Activity, Therapeutic Exercise, Self-Care/Home Management, Gradient Compression Bandaging    Long Term Goals     PT Goal 1  Goal Identifier: edema  Goal Description: Patient to have at least 15% reduction in edema of left lower extremity to reduce risk of infection, reduce discomfort and improve fit of clothing.  Target Date: 04/07/24  PT Goal 2  Goal Identifier: garments  Goal Description: Patient to acquire night time garment to aid in management of edema, if indicated.  Target Date: 04/07/24  PT Goal 3  Goal Identifier: compression wrap  Goal Description: Patient to be proficient in application of compression wraps to aid in managment of edema long term.  Target Date: 04/07/24  PT Goal 4  Goal Identifier: MLD  Goal Description: Patient to be proficient with self manual lymph drainage for long term self managment and reduction of infection risk.  Target Date: 04/07/24      Frequency of Treatment: 1-2 times per week  Duration of Treatment: 3 months    Recommended Referrals to Other Professionals:  NA  Education Assessment:   Learner/Method: Patient;No Barriers to Learning    Risks and benefits of evaluation/treatment have been explained.   Patient/Family/caregiver agrees with Plan of Care.     Evaluation Time:     PT Eval, Low Complexity Minutes (25808): 30       Signing Clinician: Marguerite Almonte, PT            [facility]                                                                                   OUTPATIENT PHYSICAL THERAPY    PLAN OF TREATMENT FOR OUTPATIENT REHABILITATION   Patient's Last Name, First Name, Eloisa Rios YOB: 1955   Provider's Name   [facility]   Medical Record No.  [id number]     Onset Date: 11/17/23  Start of Care Date: 01/09/24     Medical Diagnosis:  I89.0 (ICD-10-CM) - Lymphedema of both lower extremities      PT Treatment Diagnosis:  bilateral lower extremity and abdominal edema Plan of Treatment  Frequency/Duration: 1-2 times per week/ 3 months    Certification date from 01/09/24 to 04/07/24         See note for plan of treatment details and functional goals     Marguerite Almonte, PT                         I CERTIFY THE NEED FOR THESE SERVICES FURNISHED UNDER        THIS PLAN OF TREATMENT AND WHILE UNDER MY CARE     (Physician attestation of this document indicates review and certification of the therapy plan).              Referring Provider:  Chris Fitch    Initial Assessment  See Epic Evaluation- Start of Care Date: 01/09/24

## 2024-01-11 ENCOUNTER — THERAPY VISIT (OUTPATIENT)
Dept: PHYSICAL THERAPY | Facility: OTHER | Age: 69
End: 2024-01-11
Attending: INTERNAL MEDICINE
Payer: COMMERCIAL

## 2024-01-11 DIAGNOSIS — I89.0 LYMPHEDEMA OF BOTH LOWER EXTREMITIES: Primary | ICD-10-CM

## 2024-01-11 PROCEDURE — 97110 THERAPEUTIC EXERCISES: CPT | Mod: GP

## 2024-01-18 ENCOUNTER — THERAPY VISIT (OUTPATIENT)
Dept: PHYSICAL THERAPY | Facility: OTHER | Age: 69
End: 2024-01-18
Attending: INTERNAL MEDICINE
Payer: COMMERCIAL

## 2024-01-18 DIAGNOSIS — I89.0 LYMPHEDEMA OF BOTH LOWER EXTREMITIES: Primary | ICD-10-CM

## 2024-01-18 PROCEDURE — 97110 THERAPEUTIC EXERCISES: CPT | Mod: GP

## 2024-01-18 PROCEDURE — 97140 MANUAL THERAPY 1/> REGIONS: CPT | Mod: GP

## 2024-01-30 ENCOUNTER — THERAPY VISIT (OUTPATIENT)
Dept: PHYSICAL THERAPY | Facility: OTHER | Age: 69
End: 2024-01-30
Attending: INTERNAL MEDICINE
Payer: COMMERCIAL

## 2024-01-30 DIAGNOSIS — I89.0 LYMPHEDEMA OF BOTH LOWER EXTREMITIES: Primary | ICD-10-CM

## 2024-01-30 PROCEDURE — 97140 MANUAL THERAPY 1/> REGIONS: CPT | Mod: GP

## 2024-02-01 ENCOUNTER — THERAPY VISIT (OUTPATIENT)
Dept: PHYSICAL THERAPY | Facility: OTHER | Age: 69
End: 2024-02-01
Attending: INTERNAL MEDICINE
Payer: COMMERCIAL

## 2024-02-01 DIAGNOSIS — I89.0 LYMPHEDEMA OF BOTH LOWER EXTREMITIES: Primary | ICD-10-CM

## 2024-02-01 PROCEDURE — 97140 MANUAL THERAPY 1/> REGIONS: CPT | Mod: GP

## 2024-02-07 ENCOUNTER — THERAPY VISIT (OUTPATIENT)
Dept: PHYSICAL THERAPY | Facility: OTHER | Age: 69
End: 2024-02-07
Attending: INTERNAL MEDICINE
Payer: COMMERCIAL

## 2024-02-07 DIAGNOSIS — I89.0 LYMPHEDEMA OF BOTH LOWER EXTREMITIES: Primary | ICD-10-CM

## 2024-02-07 PROCEDURE — 97110 THERAPEUTIC EXERCISES: CPT | Mod: GP

## 2024-02-08 ENCOUNTER — TELEPHONE (OUTPATIENT)
Dept: INTERNAL MEDICINE | Facility: OTHER | Age: 69
End: 2024-02-08
Payer: COMMERCIAL

## 2024-02-08 NOTE — TELEPHONE ENCOUNTER
Patient states that she has lymphadenia from cancer.  At PT they measured her legs  Measurements for legs  needs 30-40 compression   Left ankle measurement 28cm  left thigh 62cm   Right ankle                      25cm  right thigh  55.5cm  83.5cm length of legs     Form placed in Dr Fitch in basket

## 2024-02-08 NOTE — TELEPHONE ENCOUNTER
Patient called to talk to Dr. Fitch' nurse regarding her compression stockings. A new order is needed for the new size she needs. She sees Lenka Almonte in PT to get the measurements and size. Please call. She is not available from 10-11 a.m. today.    Ivonne Morris on 2/8/2024 at 8:44 AM

## 2024-02-20 ENCOUNTER — TELEPHONE (OUTPATIENT)
Dept: INTERNAL MEDICINE | Facility: OTHER | Age: 69
End: 2024-02-20
Payer: COMMERCIAL

## 2024-02-20 ENCOUNTER — DOCUMENTATION ONLY (OUTPATIENT)
Dept: INTERNAL MEDICINE | Facility: OTHER | Age: 69
End: 2024-02-20
Payer: COMMERCIAL

## 2024-02-20 DIAGNOSIS — R60.0 LOCALIZED EDEMA: ICD-10-CM

## 2024-02-20 DIAGNOSIS — I89.0 LYMPHEDEMA: Primary | ICD-10-CM

## 2024-02-20 NOTE — TELEPHONE ENCOUNTER
Date of birth and last name verified.  Wants compression stocking order faxed to [facility].  When I asked, she has no further questions or concerns.  David Chopra LPN  Ext: [phone]

## 2024-03-05 DIAGNOSIS — I10 BENIGN ESSENTIAL HYPERTENSION: ICD-10-CM

## 2024-03-07 ENCOUNTER — TELEPHONE (OUTPATIENT)
Dept: INTERNAL MEDICINE | Facility: OTHER | Age: 69
End: 2024-03-07
Payer: COMMERCIAL

## 2024-03-07 RX ORDER — CARVEDILOL 6.25 MG/1
TABLET ORAL
Qty: 90 TABLET | Refills: 4 | Status: SHIPPED | OUTPATIENT
Start: 2024-03-07 | End: 2024-03-11

## 2024-03-07 NOTE — TELEPHONE ENCOUNTER
Reason for call: Medication or medication refill    Name of medication requested: carvedilol    How many days of medication do you have left? SEVEN     What pharmacy do you use? [facility]    Preferred method for responding to this message: Telephone Call    Phone number patient can be reached at: Cell number on file:    Telephone Information:   Mobile [phone]       If we cannot reach you directly, may we leave a detailed response at the number you provided? No        Patient stated the pharmacy has yet to hear back.              Jamia Moss on 3/7/2024 at 11:17 AM

## 2024-03-07 NOTE — TELEPHONE ENCOUNTER
[facility] sent Rx request for the following:      Requested Prescriptions   Pending Prescriptions Disp Refills    carvedilol (COREG) 6.25 MG tablet [Pharmacy Med Name: CARVEDILOL 6.25MG TABLETS] 90 tablet 1     Sig: TAKE 1/2 TABLET(3.125 MG) BY MOUTH TWICE DAILY WITH MEALS       Beta-Blockers Protocol Failed - 3/5/2024 10:00 AM        Failed - Blood pressure under 140/90 in past 12 months     BP Readings from Last 3 Encounters:   11/03/23 (!) 180/100   03/30/23 138/88   01/03/23 (!) 178/92                   Last Prescription Date:   8/16/23  Last Fill Qty/Refills:         90, R-1    Last Office Visit:              11/3/23   Future Office visit:           4/3/24    Routing refill request to provider for review/approva.    CHARLY RUSSELL RN on 3/7/2024 at 11:22 AM

## 2024-03-11 RX ORDER — TORSEMIDE 10 MG/1
10 TABLET ORAL DAILY
Qty: 90 TABLET | Refills: 4 | Status: CANCELLED | OUTPATIENT
Start: 2024-03-11

## 2024-03-27 SDOH — HEALTH STABILITY: PHYSICAL HEALTH: ON AVERAGE, HOW MANY MINUTES DO YOU ENGAGE IN EXERCISE AT THIS LEVEL?: 60 MIN

## 2024-03-27 SDOH — HEALTH STABILITY: PHYSICAL HEALTH: ON AVERAGE, HOW MANY DAYS PER WEEK DO YOU ENGAGE IN MODERATE TO STRENUOUS EXERCISE (LIKE A BRISK WALK)?: 3 DAYS

## 2024-03-27 ASSESSMENT — SOCIAL DETERMINANTS OF HEALTH (SDOH): HOW OFTEN DO YOU GET TOGETHER WITH FRIENDS OR RELATIVES?: PATIENT DECLINED

## 2024-04-01 DIAGNOSIS — E78.49 FAMILIAL HYPERLIPIDEMIA: ICD-10-CM

## 2024-04-03 ENCOUNTER — OFFICE VISIT (OUTPATIENT)
Dept: INTERNAL MEDICINE | Facility: OTHER | Age: 69
End: 2024-04-03
Attending: INTERNAL MEDICINE
Payer: COMMERCIAL

## 2024-04-03 VITALS
BODY MASS INDEX: 27.86 KG/M2 | HEART RATE: 54 BPM | RESPIRATION RATE: 16 BRPM | TEMPERATURE: 98.2 F | SYSTOLIC BLOOD PRESSURE: 126 MMHG | HEIGHT: 65 IN | DIASTOLIC BLOOD PRESSURE: 70 MMHG | WEIGHT: 167.2 LBS | OXYGEN SATURATION: 99 %

## 2024-04-03 DIAGNOSIS — F41.9 CHRONIC ANXIETY: ICD-10-CM

## 2024-04-03 DIAGNOSIS — I89.0 LYMPHEDEMA OF BOTH LOWER EXTREMITIES: ICD-10-CM

## 2024-04-03 DIAGNOSIS — R60.0 BILATERAL LOWER EXTREMITY EDEMA: ICD-10-CM

## 2024-04-03 DIAGNOSIS — B02.29 NEURALGIA, POST-HERPETIC: ICD-10-CM

## 2024-04-03 DIAGNOSIS — E78.49 FAMILIAL HYPERLIPIDEMIA: ICD-10-CM

## 2024-04-03 DIAGNOSIS — E53.8 VITAMIN B12 DEFICIENCY: ICD-10-CM

## 2024-04-03 DIAGNOSIS — E55.9 VITAMIN D DEFICIENCY: ICD-10-CM

## 2024-04-03 DIAGNOSIS — M81.0 AGE-RELATED OSTEOPOROSIS WITHOUT CURRENT PATHOLOGICAL FRACTURE: ICD-10-CM

## 2024-04-03 DIAGNOSIS — Z71.85 VACCINE COUNSELING: ICD-10-CM

## 2024-04-03 DIAGNOSIS — I10 BENIGN ESSENTIAL HYPERTENSION: Primary | ICD-10-CM

## 2024-04-03 DIAGNOSIS — Z92.3 S/P RADIATION THERAPY: ICD-10-CM

## 2024-04-03 DIAGNOSIS — R73.9 ELEVATED RANDOM BLOOD GLUCOSE LEVEL: ICD-10-CM

## 2024-04-03 DIAGNOSIS — Z85.41 PERSONAL HISTORY OF MALIGNANT NEOPLASM OF CERVIX UTERI: ICD-10-CM

## 2024-04-03 DIAGNOSIS — Z00.00 MEDICARE ANNUAL WELLNESS VISIT, SUBSEQUENT: ICD-10-CM

## 2024-04-03 LAB
ALBUMIN SERPL BCG-MCNC: 4.1 G/DL (ref 3.5–5.2)
ALBUMIN UR-MCNC: NEGATIVE MG/DL
ALP SERPL-CCNC: 54 U/L (ref 40–150)
ALT SERPL W P-5'-P-CCNC: 13 U/L (ref 0–50)
ANION GAP SERPL CALCULATED.3IONS-SCNC: 8 MMOL/L (ref 7–15)
APPEARANCE UR: CLEAR
AST SERPL W P-5'-P-CCNC: 21 U/L (ref 0–45)
BILIRUB SERPL-MCNC: 0.4 MG/DL
BILIRUB UR QL STRIP: NEGATIVE
BUN SERPL-MCNC: 12.9 MG/DL (ref 8–23)
CALCIUM SERPL-MCNC: 9.6 MG/DL (ref 8.8–10.2)
CHLORIDE SERPL-SCNC: 104 MMOL/L (ref 98–107)
CHOLEST SERPL-MCNC: 171 MG/DL
COLOR UR AUTO: YELLOW
CREAT SERPL-MCNC: 0.78 MG/DL (ref 0.51–0.95)
CREAT UR-MCNC: 19.8 MG/DL
DEPRECATED HCO3 PLAS-SCNC: 29 MMOL/L (ref 22–29)
EGFRCR SERPLBLD CKD-EPI 2021: 83 ML/MIN/1.73M2
ERYTHROCYTE [DISTWIDTH] IN BLOOD BY AUTOMATED COUNT: 13.2 % (ref 10–15)
FASTING STATUS PATIENT QL REPORTED: YES
GLUCOSE SERPL-MCNC: 89 MG/DL (ref 70–99)
GLUCOSE UR STRIP-MCNC: NEGATIVE MG/DL
HBA1C MFR BLD: 5.5 % (ref 4–6.2)
HCT VFR BLD AUTO: 40.1 % (ref 35–47)
HDLC SERPL-MCNC: 65 MG/DL
HGB BLD-MCNC: 13 G/DL (ref 11.7–15.7)
HGB UR QL STRIP: NEGATIVE
HOLD SPECIMEN: NORMAL
KETONES UR STRIP-MCNC: NEGATIVE MG/DL
LDLC SERPL CALC-MCNC: 89 MG/DL
LEUKOCYTE ESTERASE UR QL STRIP: NEGATIVE
MCH RBC QN AUTO: 29 PG (ref 26.5–33)
MCHC RBC AUTO-ENTMCNC: 32.4 G/DL (ref 31.5–36.5)
MCV RBC AUTO: 90 FL (ref 78–100)
MICROALBUMIN UR-MCNC: <12 MG/L
MICROALBUMIN/CREAT UR: NORMAL MG/G{CREAT}
NITRATE UR QL: NEGATIVE
NONHDLC SERPL-MCNC: 106 MG/DL
PH UR STRIP: 6.5 [PH] (ref 5–9)
PLATELET # BLD AUTO: 228 10E3/UL (ref 150–450)
POTASSIUM SERPL-SCNC: 4.3 MMOL/L (ref 3.4–5.3)
PROT SERPL-MCNC: 6.6 G/DL (ref 6.4–8.3)
RBC # BLD AUTO: 4.48 10E6/UL (ref 3.8–5.2)
SODIUM SERPL-SCNC: 141 MMOL/L (ref 135–145)
SP GR UR STRIP: 1.01 (ref 1–1.03)
TRIGL SERPL-MCNC: 87 MG/DL
TSH SERPL DL<=0.005 MIU/L-ACNC: 4.05 UIU/ML (ref 0.3–4.2)
UROBILINOGEN UR STRIP-MCNC: NORMAL MG/DL
WBC # BLD AUTO: 5.7 10E3/UL (ref 4–11)

## 2024-04-03 PROCEDURE — 99214 OFFICE O/P EST MOD 30 MIN: CPT | Mod: 25 | Performed by: INTERNAL MEDICINE

## 2024-04-03 PROCEDURE — 82570 ASSAY OF URINE CREATININE: CPT | Mod: ZL | Performed by: INTERNAL MEDICINE

## 2024-04-03 PROCEDURE — 36415 COLL VENOUS BLD VENIPUNCTURE: CPT | Mod: ZL | Performed by: INTERNAL MEDICINE

## 2024-04-03 PROCEDURE — G0439 PPPS, SUBSEQ VISIT: HCPCS | Performed by: INTERNAL MEDICINE

## 2024-04-03 PROCEDURE — 80053 COMPREHEN METABOLIC PANEL: CPT | Mod: ZL | Performed by: INTERNAL MEDICINE

## 2024-04-03 PROCEDURE — 81003 URINALYSIS AUTO W/O SCOPE: CPT | Mod: ZL | Performed by: INTERNAL MEDICINE

## 2024-04-03 PROCEDURE — 83036 HEMOGLOBIN GLYCOSYLATED A1C: CPT | Mod: ZL | Performed by: INTERNAL MEDICINE

## 2024-04-03 PROCEDURE — 84443 ASSAY THYROID STIM HORMONE: CPT | Mod: ZL | Performed by: INTERNAL MEDICINE

## 2024-04-03 PROCEDURE — G0463 HOSPITAL OUTPT CLINIC VISIT: HCPCS | Performed by: INTERNAL MEDICINE

## 2024-04-03 PROCEDURE — 80061 LIPID PANEL: CPT | Mod: ZL | Performed by: INTERNAL MEDICINE

## 2024-04-03 PROCEDURE — 85014 HEMATOCRIT: CPT | Mod: ZL | Performed by: INTERNAL MEDICINE

## 2024-04-03 RX ORDER — LISINOPRIL 20 MG/1
TABLET ORAL
Qty: 180 TABLET | Refills: 4 | Status: SHIPPED | OUTPATIENT
Start: 2024-04-03

## 2024-04-03 RX ORDER — ROSUVASTATIN CALCIUM 20 MG/1
20 TABLET, COATED ORAL DAILY
Qty: 90 TABLET | Refills: 4 | Status: SHIPPED | OUTPATIENT
Start: 2024-04-03

## 2024-04-03 RX ORDER — CYANOCOBALAMIN (VITAMIN B-12) 2500 MCG
2500 TABLET, SUBLINGUAL SUBLINGUAL
Qty: 100 TABLET | Refills: 4 | Status: SHIPPED | OUTPATIENT
Start: 2024-04-03

## 2024-04-03 RX ORDER — CHOLECALCIFEROL (VITAMIN D3) 50 MCG
2 TABLET ORAL EVERY OTHER DAY
Qty: 180 TABLET | Refills: 4 | Status: SHIPPED | OUTPATIENT
Start: 2024-04-03

## 2024-04-03 RX ORDER — CARVEDILOL 6.25 MG/1
TABLET ORAL
Qty: 90 TABLET | Refills: 4 | Status: SHIPPED | OUTPATIENT
Start: 2024-04-03

## 2024-04-03 ASSESSMENT — ENCOUNTER SYMPTOMS
CHILLS: 0
COUGH: 0
CHEST TIGHTNESS: 0
BACK PAIN: 0
HEMATURIA: 0
CONFUSION: 0
WOUND: 0
ADENOPATHY: 0
SINUS PRESSURE: 1
NERVOUS/ANXIOUS: 1
HEADACHES: 0
BRUISES/BLEEDS EASILY: 0
FATIGUE: 0
SHORTNESS OF BREATH: 0
FEVER: 0
ABDOMINAL PAIN: 0

## 2024-04-03 ASSESSMENT — PAIN SCALES - GENERAL: PAINLEVEL: NO PAIN (0)

## 2024-04-03 NOTE — NURSING NOTE
Patient presents for medicare wellness.  Daisy Messina LPN.........................4/3/2024  8:57 AM

## 2024-04-03 NOTE — LETTER
April 5, 2024      Eloisa Del Rosario  [address] [place] [id number]        Dear ,    We are writing to inform you of your test results.    Labs are stable.   Continue current medications.     Electronically signed by:  Chris Fitch MD  on April 5, 2024     Resulted Orders   Urine Macroscopic with reflex to Microscopic   Result Value Ref Range    Color Urine Yellow Colorless, Straw, Light Yellow, Yellow    Appearance Urine Clear Clear    Glucose Urine Negative Negative mg/dL    Bilirubin Urine Negative Negative    Ketones Urine Negative Negative mg/dL    Specific Gravity Urine 1.006 1.000 - 1.030    Blood Urine Negative Negative    pH Urine 6.5 5.0 - 9.0    Protein Albumin Urine Negative Negative mg/dL    Urobilinogen Urine Normal Normal, 2.0 mg/dL    Nitrite Urine Negative Negative    Leukocyte Esterase Urine Negative Negative    Narrative    Microscopic not indicated   TSH with free T4 reflex   Result Value Ref Range    TSH 4.05 0.30 - 4.20 uIU/mL   Hemoglobin A1c   Result Value Ref Range    Hemoglobin A1C 5.5 4.0 - 6.2 %   CBC with platelets   Result Value Ref Range    WBC Count 5.7 4.0 - 11.0 10e3/uL    RBC Count 4.48 3.80 - 5.20 10e6/uL    Hemoglobin 13.0 11.7 - 15.7 g/dL    Hematocrit 40.1 35.0 - 47.0 %    MCV 90 78 - 100 fL    MCH 29.0 26.5 - 33.0 pg    MCHC 32.4 31.5 - 36.5 g/dL    RDW 13.2 10.0 - 15.0 %    Platelet Count 228 150 - 450 10e3/uL   Albumin Random Urine Quantitative with Creat Ratio   Result Value Ref Range    Creatinine Urine mg/dL 19.8 mg/dL      Comment:      The reference ranges have not been established in urine creatinine. The results should be integrated into the clinical context for interpretation.    Albumin Urine mg/L <12.0 mg/L      Comment:      The reference ranges have not been established in urine albumin. The results should be integrated into the clinical context for interpretation.    Albumin Urine mg/g Cr        Comment:      Unable to calculate,  urine albumin and/or urine creatinine is outside detectable limits.  Microalbuminuria is defined as an albumin:creatinine ratio of 17 to 299 for males and 25 to 299 for females. A ratio of albumin:creatinine of 300 or higher is indicative of overt proteinuria.  Due to biologic variability, positive results should be confirmed by a second, first-morning random or 24-hour timed urine specimen. If there is discrepancy, a third specimen is recommended. When 2 out of 3 results are in the microalbuminuria range, this is evidence for incipient nephropathy and warrants increased efforts at glucose control, blood pressure control, and institution of therapy with an angiotensin-converting-enzyme (ACE) inhibitor (if the patient can tolerate it).     Lipid Profile   Result Value Ref Range    Cholesterol 171 <200 mg/dL    Triglycerides 87 <150 mg/dL    Direct Measure HDL 65 >=50 mg/dL    LDL Cholesterol Calculated 89 <=100 mg/dL    Non HDL Cholesterol 106 <130 mg/dL    Patient Fasting > 8hrs? Yes     Narrative    Cholesterol  Desirable:  <200 mg/dL    Triglycerides  Normal:  Less than 150 mg/dL  Borderline High:  150-199 mg/dL  High:  200-499 mg/dL  Very High:  Greater than or equal to 500 mg/dL    Direct Measure HDL  Female:  Greater than or equal to 50 mg/dL   Male:  Greater than or equal to 40 mg/dL    LDL Cholesterol  Desirable:  <100mg/dL  Above Desirable:  100-129 mg/dL   Borderline High:  130-159 mg/dL   High:  160-189 mg/dL   Very High:  >= 190 mg/dL    Non HDL Cholesterol  Desirable:  130 mg/dL  Above Desirable:  130-159 mg/dL  Borderline High:  160-189 mg/dL  High:  190-219 mg/dL  Very High:  Greater than or equal to 220 mg/dL   Comprehensive metabolic panel   Result Value Ref Range    Sodium 141 135 - 145 mmol/L      Comment:      Reference intervals for this test were updated on 09/26/2023 to more accurately reflect our healthy population. There may be differences in the flagging of prior results with similar values  performed with this method. Interpretation of those prior results can be made in the context of the updated reference intervals.     Potassium 4.3 3.4 - 5.3 mmol/L    Carbon Dioxide (CO2) 29 22 - 29 mmol/L    Anion Gap 8 7 - 15 mmol/L    Urea Nitrogen 12.9 8.0 - 23.0 mg/dL    Creatinine 0.78 0.51 - 0.95 mg/dL    GFR Estimate 83 >60 mL/min/1.73m2    Calcium 9.6 8.8 - 10.2 mg/dL    Chloride 104 98 - 107 mmol/L    Glucose 89 70 - 99 mg/dL    Alkaline Phosphatase 54 40 - 150 U/L      Comment:      Reference intervals for this test were updated on 11/14/2023 to more accurately reflect our healthy population. There may be differences in the flagging of prior results with similar values performed with this method. Interpretation of those prior results can be made in the context of the updated reference intervals.    AST 21 0 - 45 U/L      Comment:      Reference intervals for this test were updated on 6/12/2023 to more accurately reflect our healthy population. There may be differences in the flagging of prior results with similar values performed with this method. Interpretation of those prior results can be made in the context of the updated reference intervals.    ALT 13 0 - 50 U/L      Comment:      Reference intervals for this test were updated on 6/12/2023 to more accurately reflect our healthy population. There may be differences in the flagging of prior results with similar values performed with this method. Interpretation of those prior results can be made in the context of the updated reference intervals.      Protein Total 6.6 6.4 - 8.3 g/dL    Albumin 4.1 3.5 - 5.2 g/dL    Bilirubin Total 0.4 <=1.2 mg/dL       If you have any questions or concerns, please call the clinic at the number listed above.       Sincerely,      Chris Fitch MD

## 2024-04-03 NOTE — PROGRESS NOTES
Assessment & Plan     ICD-10-CM    1. Benign essential hypertension  I10 carvedilol (COREG) 6.25 MG tablet     lisinopril (ZESTRIL) 20 MG tablet     Urine Macroscopic with reflex to Microscopic     TSH with free T4 reflex     CBC with platelets     Albumin Random Urine Quantitative with Creat Ratio     Comprehensive metabolic panel     Urine Macroscopic with reflex to Microscopic     TSH with free T4 reflex     CBC with platelets     Albumin Random Urine Quantitative with Creat Ratio     Comprehensive metabolic panel      2. Chronic anxiety  F41.9       3. Familial hyperlipidemia  E78.49 rosuvastatin (CRESTOR) 20 MG tablet     Lipid Profile     Lipid Profile      4. Lymphedema of both lower extremities  I89.0       5. Neuralgia, post-herpetic  B02.29       6. Age-related osteoporosis without current pathological fracture  M81.0       7. Personal history of malignant neoplasm of cervix uteri  Z85.41       8. S/P radiation therapy  Z92.3       9. Vitamin B12 deficiency  E53.8 vitamin B-12 (CYANOCOBALAMIN) 2500 MCG sublingual tablet      10. Vitamin D deficiency  E55.9 vitamin D3 (CHOLECALCIFEROL) 50 mcg (2000 units) tablet      11. Bilateral lower extremity edema  R60.0       12. Elevated random blood glucose level  R73.09 Hemoglobin A1c     Hemoglobin A1c      13. Vaccine counseling  Z71.85       14. Medicare annual wellness visit, subsequent  Z00.00          Patient presents for Medicare annual wellness visit as well as follow-up multiple issues.    Chronic anxiety, ongoing.  She is doing reasonably well at this time.  Not currently taking medications for anxiety.  Continue close monitoring and follow-up.    Patient has chronic lower extremity edema/lymphedema.  She has history of pelvic radiation therapy due to cervical cancer.  Uses her leg wraps as needed.    Postherpetic neuralgia, doing better.  Not currently utilizing medication for symptoms.    Age-related osteoporosis, continues with calcium, vitamin D.   "Weightbearing exercise.      Vitamin B12 and vitamin D deficiency.  Continue with oral replacement.  This updated.  Refill sent to pharmacy.    Elevated random glucose, check hemoglobin A1c.    HYPERTENSION - Ongoing. Blood pressure is currently well controlled.  Medication side effects: None. Denies syncope or presyncope.  Continue current medications.   Medication list reviewed/updated. Refills completed as needed.      Familial HYPERLIPIDEMIA.  Ongoing. LDL is at goal: Yes. Triglycerides are at goal: Yes.    Medication side effects reported: None.   Continue current medications for now. Medication list reviewed/updated. Refills completed as needed.  Recent Labs   Lab Test 04/03/24  0954 03/30/23  0935   CHOL 171 173   HDL 65 63   LDL 89 90   TRIG 87 101        Vaccine counseling completed.  Encourage routine / annual vaccinations.           BMI  Estimated body mass index is 27.82 kg/m  as calculated from the following:    Height as of this encounter: 1.651 m (5' 5\").    Weight as of this encounter: 75.8 kg (167 lb 3.2 oz).     Counseling  Appropriate preventive services were discussed with this patient, including applicable screening as appropriate for fall prevention, nutrition, physical activity, Tobacco-use cessation, weight loss and cognition.  Checklist reviewing preventive services available has been given to the patient.  Reviewed patient's diet, addressing concerns and/or questions.   She is at risk for lack of exercise and has been provided with information to increase physical activity for the benefit of her well-being.         Return in about 1 year (around 4/3/2025) for Annual Medicare Wellness Visit.      Chris Fitch MD  [facility] AND \[facility]\""    Review of Systems   Constitutional:  Negative for chills, fatigue and fever.   HENT:  Positive for congestion, hearing loss (-- low tones) and sinus pressure.    Eyes:  Negative for visual disturbance.   Respiratory:  Negative for cough, chest " tightness and shortness of breath.    Cardiovascular:  Negative for chest pain.   Gastrointestinal:  Negative for abdominal pain.   Endocrine:        + osteoporosis   Genitourinary:  Negative for hematuria.        Hx urterine cancer s/p radiation --> lymphedema ( Left > Right Legs).   Musculoskeletal:  Negative for back pain.   Skin:  Negative for rash and wound.   Neurological:  Negative for syncope and headaches.        Hx of Post-herpetic neuralgia - left flank   Hematological:  Negative for adenopathy. Does not bruise/bleed easily.   Psychiatric/Behavioral:  Negative for confusion. The patient is nervous/anxious.           Preventive Care Visit  [facility]  Chris Fitch MD, Internal Medicine  Apr 3, 2024      SUBJECTIVE:   Eloisa is a 68 year old, presenting for the following:  Medicare wellness        4/3/2024     8:53 AM   Additional Questions   Roomed by Daisy Messina LPN     Are you in the first 12 months of your Medicare coverage?  No    HPI    Today's PHQ-2 Score:       4/2/2024    12:30 PM   PHQ-2 ( 1999 Pfizer)   Q1: Little interest or pleasure in doing things 0   Q2: Feeling down, depressed or hopeless 0   PHQ-2 Score 0   Q1: Little interest or pleasure in doing things Not at all   Q2: Feeling down, depressed or hopeless Not at all   PHQ-2 Score 0           Have you ever done Advance Care Planning? (For example, a Health Directive, POLST, or a discussion with a medical provider or your loved ones about your wishes): No, advance care planning information given to patient to review.  Advanced care planning was discussed at today's visit.       Fall risk  Fallen 2 or more times in the past year?: No    Cognitive Screening   1) Repeat 3 items (Leader, Season, Table)    2) Clock draw: NORMAL  3) 3 item recall: Recalls 3 objects  Results: 3 items recalled: COGNITIVE IMPAIRMENT LESS LIKELY    Mini-CogTM Copyright S Julissa. Licensed by the author for use in [employer];  reprinted with permission (sodiane@.[facility]). All rights reserved.      Do you have sleep apnea, excessive snoring or daytime drowsiness? : no    Reviewed and updated as needed this visit by clinical staff   Tobacco  Allergies  Meds  Problems  Med Hx  Surg Hx  Fam Hx          Reviewed and updated as needed this visit by Provider   Tobacco  Allergies  Meds  Problems  Med Hx  Surg Hx  Fam Hx          Social History     Tobacco Use    Smoking status: Never     Passive exposure: Never    Smokeless tobacco: Never   Substance Use Topics    Alcohol use: No     Alcohol/week: 0.0 standard drinks of alcohol             3/27/2024     1:36 PM   Alcohol Use   Prescreen: >3 drinks/day or >7 drinks/week? Not Applicable     Do you have a current opioid prescription? No  Do you use any other controlled substances or medications that are not prescribed by a provider? None          Current providers sharing in care for this patient include:   Patient Care Team:  Chris Fitch MD as PCP - General (Internal Medicine)  Chris Fitch MD as Assigned PCP  Chris Fitch MD (Internal Medicine)  Nicolette Varela MD as Assigned Surgical Provider    The following health maintenance items are reviewed in Epic and correct as of today:  Health Maintenance   Topic Date Due    ZOSTER IMMUNIZATION (1 of 2) Never done    RSV VACCINE (Pregnancy & 60+) (1 - 1-dose 60+ series) Never done    DTAP/TDAP/TD IMMUNIZATION (2 - Td or Tdap) 02/19/2019    Pneumococcal Vaccine: 65+ Years (1 of 1 - PCV) Never done    COLORECTAL CANCER SCREENING  04/20/2023    COVID-19 Vaccine (3 - 2023-24 season) 09/01/2023    MEDICARE ANNUAL WELLNESS VISIT  04/03/2025    LIPID  04/03/2025    FALL RISK ASSESSMENT  04/03/2025    MAMMO SCREENING  05/31/2025    GLUCOSE  04/03/2027    ADVANCE CARE PLANNING  04/05/2029    DEXA  07/30/2036    PHQ-2 (once per calendar year)  Completed    HEPATITIS C SCREENING  Addressed    IPV IMMUNIZATION  Aged Out    HPV IMMUNIZATION  Aged Out  "   MENINGITIS IMMUNIZATION  Aged Out    RSV MONOCLONAL ANTIBODY  Aged Out    INFLUENZA VACCINE  Discontinued           FHS-7:       5/31/2023     2:21 PM   Breast CA Risk Assessment (FHS-7)   Did any of your first-degree relatives have breast or ovarian cancer? No   Did any of your relatives have bilateral breast cancer? No   Did any man in your family have breast cancer? No   Did any woman in your family have breast and ovarian cancer? No   Did any woman in your family have breast cancer before age 50 y? No   Do you have 2 or more relatives with breast and/or ovarian cancer? No   Do you have 2 or more relatives with breast and/or bowel cancer? No         Pertinent mammograms are reviewed under the imaging tab.  Review of Systems   Constitutional:  Negative for chills, fatigue and fever.   HENT:  Positive for congestion, hearing loss (-- low tones) and sinus pressure.    Eyes:  Negative for visual disturbance.   Respiratory:  Negative for cough, chest tightness and shortness of breath.    Cardiovascular:  Negative for chest pain.   Gastrointestinal:  Negative for abdominal pain.   Endocrine:        + osteoporosis   Genitourinary:  Negative for hematuria.        Hx urterine cancer s/p radiation --> lymphedema ( Left > Right Legs).   Musculoskeletal:  Negative for back pain.   Skin:  Negative for rash and wound.   Neurological:  Negative for syncope and headaches.        Hx of Post-herpetic neuralgia - left flank   Hematological:  Negative for adenopathy. Does not bruise/bleed easily.   Psychiatric/Behavioral:  Negative for confusion. The patient is nervous/anxious.           OBJECTIVE:   /70   Pulse 54   Temp 98.2  F (36.8  C) (Tympanic)   Resp 16   Ht 1.651 m (5' 5\")   Wt 75.8 kg (167 lb 3.2 oz)   LMP  (LMP Unknown)   SpO2 99%   BMI 27.82 kg/m     Estimated body mass index is 27.82 kg/m  as calculated from the following:    Height as of this encounter: 1.651 m (5' 5\").    Weight as of this encounter: " "75.8 kg (167 lb 3.2 oz).  Physical Exam  Constitutional:       General: She is not in acute distress.     Appearance: She is well-developed. She is not diaphoretic.   HENT:      Head: Normocephalic and atraumatic.   Eyes:      General: No scleral icterus.     Conjunctiva/sclera: Conjunctivae normal.   Neck:      Vascular: No carotid bruit.   Cardiovascular:      Rate and Rhythm: Normal rate and regular rhythm.      Pulses: Normal pulses.   Pulmonary:      Effort: Pulmonary effort is normal.      Breath sounds: Normal breath sounds.   Abdominal:      Palpations: Abdomen is soft.      Tenderness: There is no abdominal tenderness.   Musculoskeletal:         General: No deformity.      Cervical back: Neck supple.      Right lower le+ Pitting Edema present.      Left lower le+ Pitting Edema present.      Comments: + lymphedema bilateral legs   Lymphadenopathy:      Cervical: No cervical adenopathy.   Skin:     General: Skin is warm and dry.      Findings: No rash.   Neurological:      Mental Status: She is alert. Mental status is at baseline.   Psychiatric:         Mood and Affect: Mood normal.         Behavior: Behavior normal.               Patient has been advised of split billing requirements and indicates understanding: Yes      Counseling  Reviewed preventive health counseling, as reflected in patient instructions  Special attention given to:       Regular exercise       Healthy diet/nutrition       Vision screening       Hearing screening       Dental care       Bladder control       Fall risk prevention       Immunizations    BMI  Estimated body mass index is 27.82 kg/m  as calculated from the following:    Height as of this encounter: 1.651 m (5' 5\").    Weight as of this encounter: 75.8 kg (167 lb 3.2 oz).       She reports that she has never smoked. She has never been exposed to tobacco smoke. She has never used smokeless tobacco.      Appropriate preventive services were discussed with this patient, " including applicable screening as appropriate for fall prevention, nutrition, physical activity, Tobacco-use cessation, weight loss and cognition.  Checklist reviewing preventive services available has been given to the patient.    Reviewed patients plan of care and provided an AVS. The Complex Care Plan (for patients with higher acuity and needing more deliberate coordination of services) for Eloisa meets the Care Plan requirement. This Care Plan has been established and reviewed with the Patient.          Signed Electronically by: Chris Fitch MD    Identified Health Risks  I have reviewed Opioid Use Disorder and Substance Use Disorder risk factors and made any needed referrals.

## 2024-04-03 NOTE — PATIENT INSTRUCTIONS
Blood pressure at home is well controlled.     Medications refilled.   Labs are pending.     Immunization History   Administered Date(s) Administered    COVID-19 MONOVALENT 12+ (Pfizer) 05/27/2021, 06/17/2021    TDAP Vaccine (Boostrix) 02/19/2009      Consider:  -- Yearly - COVID-19 Vaccination shot   -- Annual Flu / Influenza vaccination - Every Fall (Starting about October 1st)    Consider:  -- RSV vaccine for age 60+   (If Medicare insurance - get vaccine at the Pharmacy.     Private insurance may be able to get in clinic with clinic shot nurse.)  --- Check Cost $$$    -- Get your tetanus shot updated - from one of the local pharmacies, at your convenience -- Check cost/coverage.     -- Get the new shingles shot (2 in series) (Shingrix) - from one of the local pharmacies, at your convenience -- Check cost/coverage.       Return in approximately 1 year, or sooner as needed for follow-up with Dr. Fitch.  - Annual Follow-up / Physical - Medicare Annual Wellness Visit     Clinic : [phone]  Appointment line: [phone]

## 2024-04-04 RX ORDER — ROSUVASTATIN CALCIUM 20 MG/1
TABLET, COATED ORAL
Qty: 90 TABLET | Refills: 4 | OUTPATIENT
Start: 2024-04-04

## 2024-04-04 NOTE — TELEPHONE ENCOUNTER
Disp Refills Start End [facility]LE[facility]   rosuvastatin (CRESTOR) 20 MG tablet 90 tablet 4 4/3/2024 -- No   Sig - Route: Take 1 tablet (20 mg) by mouth daily - for Cholesterol / heart attack risk reduction - Oral   To [facility] requesting   Should have refills.  Karla De Luna RN on 4/4/2024 at 11:00 AM

## 2024-04-10 NOTE — PROGRESS NOTES
DISCHARGE  Reason for Discharge: No further expectation of progress.  Patient non compliant with compression and MLD.     Equipment Issued: NA    Discharge Plan: NA    Referring Provider:  Chris Fitch         02/07/24 0500   Appointment Info   Signing clinician's name / credentials Marguerite Almonte, PT, CLT, CAPP   Visits Used 6   Medical Diagnosis I89.0 (ICD-10-CM) - Lymphedema of both lower extremities   PT Tx Diagnosis bilateral lower extremity and abdominal edema   Quick Adds Certification   Progress Note/Certification   Start of Care Date 01/09/24   Onset of illness/injury or Date of Surgery 11/17/23   Therapy Frequency 1-2 times per week   Predicted Duration 3 months   Certification date from 01/09/24   Certification date to 04/07/24   GOALS   PT Goals 2;3;4   PT Goal 1   Goal Identifier edema   Goal Description Patient to have at least 15% reduction in edema of left lower extremity to reduce risk of infection, reduce discomfort and improve fit of clothing.   Goal Progress not met due to non compliance with compression   Target Date 04/07/24   PT Goal 2   Goal Identifier garments   Goal Description Patient to acquire night time garment to aid in management of edema, if indicated.   Goal Progress not met   Target Date 04/07/24   PT Goal 3   Goal Identifier compression wrap   Goal Description Patient to be proficient in application of compression wraps to aid in managment of edema long term.   Goal Progress not met   Target Date 04/07/24   PT Goal 4   Goal Identifier MLD   Goal Description Patient to be proficient with self manual lymph drainage for long term self managment and reduction of infection risk.   Goal Progress not met   Target Date 04/07/24   Subjective Report   Subjective Report Wrapped leg twice. Sunday and monday night. Didn't go up far enough or tight enough. in the morning, there was a slight change. Still not motivated to wrap   Treatment Interventions (PT)   Interventions Manual  Therapy;Therapeutic Activity   Therapeutic Procedure/Exercise   Therapeutic Procedures: strength, endurance, ROM, flexibility minutes (21376) 30   Ther Proc 1 education, HEP   Ther Proc 1 - Details instruction and application of gradient compression wraps reviewed; patient will need to order large size for stocking for left leg. Right leg still fits a medium. Patient given this information as a reminder. Will contact PT if needed. left ankle 28 cm, thigh 62 cm; right ankle 25cm, thigh 55.5cm; length 83.5cm   Manual Therapy   Manual Therapy 1 manual lymph drainage (MLD) to aid in reduction of edema   Manual Therapy 1 - Details MLD for bilateral lower extremities. strongly encouraged to wrap during day time for a few days to reduce increased edema. Discussed with the patient that if she isn't going to wrap consistently, edema will not improve and PT won't be able to help her further.   Patient Response/Progress tolerated well   Education   Learner/Method Patient;No Barriers to Learning   Plan   Home program skin care, lotion use, self MLD   Plan for next session hold chart open two months   Total Session Time   Timed Code Treatment Minutes 30   Total Treatment Time (sum of timed and untimed services) 30

## 2024-05-02 DIAGNOSIS — I10 BENIGN ESSENTIAL HYPERTENSION: ICD-10-CM

## 2024-05-07 RX ORDER — LISINOPRIL 20 MG/1
TABLET ORAL
Qty: 180 TABLET | Refills: 4 | OUTPATIENT
Start: 2024-05-07

## 2024-05-07 NOTE — TELEPHONE ENCOUNTER
[facility] sent Rx request for the following:      Requested Prescriptions   Pending Prescriptions Disp Refills    lisinopril (ZESTRIL) 20 MG tablet [Pharmacy Med Name: LISINOPRIL 20MG TABLETS] 180 tablet 4     Sig: TAKE 1 TABLET BY MOUTH EVERY MORNING AND 1/2 TABLET EVERY EVENING        Last Prescription Date:   4/3/24    Duplicate.     CHARLY RUSSELL RN on 5/7/2024 at 3:48 PM

## 2024-07-24 ENCOUNTER — OFFICE VISIT (OUTPATIENT)
Dept: FAMILY MEDICINE | Facility: OTHER | Age: 69
End: 2024-07-24
Payer: COMMERCIAL

## 2024-07-24 ENCOUNTER — NURSE TRIAGE (OUTPATIENT)
Dept: INTERNAL MEDICINE | Facility: OTHER | Age: 69
End: 2024-07-24
Payer: COMMERCIAL

## 2024-07-24 VITALS
WEIGHT: 170.5 LBS | HEART RATE: 62 BPM | TEMPERATURE: 98.2 F | SYSTOLIC BLOOD PRESSURE: 218 MMHG | DIASTOLIC BLOOD PRESSURE: 120 MMHG | BODY MASS INDEX: 28.37 KG/M2 | OXYGEN SATURATION: 98 %

## 2024-07-24 DIAGNOSIS — I10 WHITE COAT SYNDROME WITH HYPERTENSION: ICD-10-CM

## 2024-07-24 DIAGNOSIS — T63.441A BEE STING, ACCIDENTAL OR UNINTENTIONAL, INITIAL ENCOUNTER: Primary | ICD-10-CM

## 2024-07-24 DIAGNOSIS — I10 BENIGN ESSENTIAL HYPERTENSION: ICD-10-CM

## 2024-07-24 PROCEDURE — G0463 HOSPITAL OUTPT CLINIC VISIT: HCPCS

## 2024-07-24 PROCEDURE — 99213 OFFICE O/P EST LOW 20 MIN: CPT

## 2024-07-24 NOTE — TELEPHONE ENCOUNTER
"1. TYPE: \"What type of sting was it?\" (bee, yellow jacket, etc.)       Positive it was a big bee  2. ONSET: \"When did it occur?\"       11 am today  3. LOCATION: \"Where is the sting located?\"  \"How many stings?\"      knee  4. SWELLING SIZE: \"How big is the swelling?\" (e.g., inches or cm)      Little smaller than a golf ball  5. REDNESS: \"Is the area red or pink?\" If Yes, ask: \"What size is area of redness?\" (e.g., inches or cm). \"When did the redness start?\"      Redness and swelling golf ball sized   6. PAIN: \"Is there any pain?\" If Yes, ask: \"How bad is it?\"  (Scale 1-10; or mild, moderate, severe)      Denies currently   7. ITCHING: \"Is there any itching?\" If Yes, ask: \"How bad is it?\"       denies  8. RESPIRATORY DISTRESS: \"Describe your breathing.\"      denies  9. PRIOR REACTIONS: \"Have you had any severe allergic reactions to stings in the past?\" if yes, ask: \"What happened?\"      Spider bite during her 30's   10. OTHER SYMPTOMS: \"Do you have any other symptoms?\" (e.g., abdomen pain, face or tongue swelling, new rash elsewhere, vomiting)        denies  Reason for Disposition   Hives, itching, or swelling elsewhere on body (i.e., not at a site of sting) and started within 2 hours of sting    Protocols used: Bee or Yellow Jacket Sting-A-OH    "

## 2024-07-24 NOTE — TELEPHONE ENCOUNTER
Called and gave patient NKK recommendation. Patient verbalized understanding and plans to follow disposition. Teena Scott RN on 7/24/2024 at 12:07 PM

## 2024-07-24 NOTE — TELEPHONE ENCOUNTER
Patient stated she has lympedema in both legs and got stung by a bee on left knee and is seeking medical advice. Please call.    Alexia Rome on 7/24/2024 at 11:46 AM

## 2024-07-24 NOTE — TELEPHONE ENCOUNTER
Take Benadryl over the counter- [facility] if needed. Go to ER if hives become progressive or if difficulty breathing occurs.

## 2024-07-24 NOTE — PROGRESS NOTES
ASSESSMENT/PLAN:    I have reviewed the nursing notes.  I have reviewed the findings, diagnosis, plan and need for follow up with the patient.    1. Bee sting, accidental or unintentional, initial encounter    Local reaction to insect bite without anaphylactic symptoms.  No clinical signs/symptoms of anaphylactic reaction at this time - no lip or tongue swelling, tingling or numbness; no throat swelling, tingling, numbness, or feeling of need to clear or difficulty swallowing; no chest tightness, cough, shortness of breath or difficulty breathing.  Normal vital signs except for elevated BP.       May use over-the-counter Tylenol or ibuprofen PRN  May take an over-the-counter antihistamine if she develops itching or edema around the sting site.  Cool compresses or ice packs if needed.     Discussed with patient signs/symptoms of delayed anaphylactic reaction ( lip, tongue, or throat numbness, tingling, swelling, or difficulty talking or swallowing or difficulty breathing, feeling of need to clear throat, etc) and need to seek immediate medical attention - call 911 or go to ER.     2. Benign essential hypertension  3. White coat syndrome with hypertension    Patient's BP is 218/120.  BP was the same when rechecked 15 minutes later.  Patient has documented whitecoat syndrome with hypertension.  Patient is currently on lisinopril and carvedilol and states she is taking her medications as prescribed.  She checks her BP at home regularly and states that it is usually not elevated.  Patient is currently not experiencing any signs of hypertension such as vision changes, headache, dizziness, chest pain, or shortness of breath.  Discussed with patient that with a blood pressure this elevated she should be further evaluated in the emergency department but patient refused and would like to go home.  She states that if her BP remains high at home or she develops symptoms of a hypertensive crisis she will go to the ED.  Discussed  risks of having a BP this elevated.  Patient verbalized understanding and chose to go home instead of being further evaluated in the ED for her elevated BP.    Discussed warning signs/symptoms indicative of need to f/u    Follow up if symptoms persist or worsen or concerns    I explained my diagnostic considerations and recommendations to the patient, who voiced understanding and agreement with the treatment plan. All questions were answered. We discussed potential side effects of any prescribed or recommended therapies, as well as expectations for response to treatments.    RYAN Aviles CNP  7/24/2024  1:29 PM    HPI:    Eloisa Del Rosario is a 68 year old female  who [facility] for concerns of bee sting    Patient states that she was stung by a bee on her left knee earlier today while she was outside getting weeds.  She states she removed the stinger.  She states there was some mild erythema surrounding the sting site which has improved.  She denies any edema, shortness of breath, difficulty breathing, chest pain, oral swelling, or dizziness.  She has no history of anaphylactic reactions to bee stings.  She states she applied some antibiotic ointment to the sting site.  She denies any current pain or tenderness at the site.      Past Medical History:   Diagnosis Date    Non-Hodgkin lymphoma of extranodal and solid organ sites (H)     05/28/1991,Exploratory lap. retroperitoneal lymph node dissection    Personal history of irradiation     Radiation therapy, cesium implants for cervical cancer    Small bowel obstruction (H) 12/16/2022    Small bowel obstruction (H) 12/16/2022     Past Surgical History:   Procedure Laterality Date    COLONOSCOPY      1991    LAPAROTOMY EXPLORATORY      Exploratory lap.retroperitoneal lymph node dissection    LAPAROTOMY EXPLORATORY N/A 12/16/2022    Procedure: LAPAROTOMY;  Surgeon: Nicolette Varela MD;  Location: [facility]    SALPINGO-OOPHORECTOMY BILATERAL       "Removal of left tube and ovary     Social History     Tobacco Use    Smoking status: Former     Current packs/day: 0.00     Average packs/day: 0.3 packs/day for 15.0 years (3.8 ttl pk-yrs)     Types: Cigarettes     Start date: 1977     Quit date: 1992     Years since quittin.5     Passive exposure: Never    Smokeless tobacco: Never   Substance Use Topics    Alcohol use: No     Alcohol/week: 0.0 standard drinks of alcohol     Current Outpatient Medications   Medication Sig Dispense Refill    acetaminophen (TYLENOL) 500 MG tablet Take 1,000 mg by mouth 2 times daily as needed for mild pain      carvedilol (COREG) 6.25 MG tablet TAKE 1/2 TABLET(3.125 MG) BY MOUTH TWICE DAILY WITH MEALS 90 tablet 4    lisinopril (ZESTRIL) 20 MG tablet TAKE 1 TABLET BY MOUTH EVERY MORNING, AND TAKE 1/2 TABLET EVERY EVENING 180 tablet 4    rosuvastatin (CRESTOR) 20 MG tablet Take 1 tablet (20 mg) by mouth daily - for Cholesterol / heart attack risk reduction 90 tablet 4    vitamin B-12 (CYANOCOBALAMIN) 2500 MCG sublingual tablet Take 1 tablet (2,500 mcg) by mouth Every Mon, Wed, Fri Morning - for Low B12 100 tablet 4    vitamin D3 (CHOLECALCIFEROL) 50 mcg (2000 units) tablet Take 2 tablets (100 mcg) by mouth every other day (Patient not taking: Reported on 2024) 180 tablet 4     Allergies   Allergen Reactions    Erythromycin Itching, Rash, Shortness Of Breath and Hives    Other [No Clinical Screening - See Comments] Anaphylaxis     Cannot have any \"myacin's\".   Macrolide antibiotics     Past medical history, past surgical history, current medications and allergies reviewed and accurate to the best of my knowledge.      ROS:  Refer to HPI    BP (!) 218/120   Pulse 62   Temp 98.2  F (36.8  C) (Tympanic)   Wt 77.3 kg (170 lb 8 oz)   LMP  (LMP Unknown)   SpO2 98%   BMI 28.37 kg/m      EXAM:  General Appearance: Well appearing 68 year old male, appropriate appearance for age. No acute distress   Eyes: conjunctivae " normal without erythema or irritation, corneas clear, no drainage or crusting, no eyelid swelling, pupils equal   Nose:  Bilateral nares: no erythema, no edema, no drainage or congestion   Respiratory: normal chest wall and respirations.  Normal effort. No increased work of breathing.  No cough appreciated.  Cardiac: RRR with no murmurs  Musculoskeletal:  Equal movement of bilateral upper extremities.  Equal movement of bilateral lower extremities.  Normal gait.    Dermatological: Bee sting of left knee with very mild surrounding erythema, no tenderness with palpation, no signs of infection noted, no edema noted  Neuro: Alert and oriented to person, place, and time.    Psychological: normal affect, alert, oriented, and pleasant.

## 2024-07-24 NOTE — NURSING NOTE
"Chief Complaint   Patient presents with    Insect Bites     Patient was stung by a bumble bee this AM. Red and swollen.  Initial BP (!) 218/120   Pulse 62   Temp 98.2  F (36.8  C) (Tympanic)   Wt 77.3 kg (170 lb 8 oz)   LMP  (LMP Unknown)   SpO2 98%   BMI 28.37 kg/m   Estimated body mass index is 28.37 kg/m  as calculated from the following:    Height as of 4/3/24: 1.651 m (5' 5\").    Weight as of this encounter: 77.3 kg (170 lb 8 oz).  Medication Review: complete    The next two questions are to help us understand your food security.  If you are feeling you need any assistance in this area, we have resources available to support you today.          3/27/2024   SDOH- Food Insecurity   Within the past 12 months, did you worry that your food would run out before you got money to buy more? N   Within the past 12 months, did the food you bought just not last and you didn t have money to get more? N            Health Care Directive:  Patient does not have a Health Care Directive or Living Will: Discussed advance care planning with patient; however, patient declined at this time.    Sonal Ly MA      "

## 2024-08-22 ENCOUNTER — DOCUMENTATION ONLY (OUTPATIENT)
Dept: INTERNAL MEDICINE | Facility: OTHER | Age: 69
End: 2024-08-22
Payer: COMMERCIAL

## 2024-08-22 DIAGNOSIS — I89.0 LYMPHEDEMA OF BOTH LOWER EXTREMITIES: Primary | ICD-10-CM

## 2024-08-22 DIAGNOSIS — R60.0 LOCALIZED EDEMA: ICD-10-CM

## 2025-04-04 SDOH — HEALTH STABILITY: PHYSICAL HEALTH: ON AVERAGE, HOW MANY DAYS PER WEEK DO YOU ENGAGE IN MODERATE TO STRENUOUS EXERCISE (LIKE A BRISK WALK)?: 0 DAYS

## 2025-04-04 ASSESSMENT — SOCIAL DETERMINANTS OF HEALTH (SDOH): HOW OFTEN DO YOU GET TOGETHER WITH FRIENDS OR RELATIVES?: ONCE A WEEK

## 2025-04-09 ENCOUNTER — OFFICE VISIT (OUTPATIENT)
Dept: INTERNAL MEDICINE | Facility: OTHER | Age: 70
End: 2025-04-09
Attending: INTERNAL MEDICINE
Payer: COMMERCIAL

## 2025-04-09 VITALS
SYSTOLIC BLOOD PRESSURE: 168 MMHG | HEIGHT: 66 IN | WEIGHT: 167.6 LBS | TEMPERATURE: 97.8 F | HEART RATE: 69 BPM | BODY MASS INDEX: 26.93 KG/M2 | RESPIRATION RATE: 16 BRPM | DIASTOLIC BLOOD PRESSURE: 104 MMHG | OXYGEN SATURATION: 98 %

## 2025-04-09 DIAGNOSIS — Z23 NEED FOR SHINGLES VACCINE: ICD-10-CM

## 2025-04-09 DIAGNOSIS — Z85.41 PERSONAL HISTORY OF MALIGNANT NEOPLASM OF CERVIX UTERI: ICD-10-CM

## 2025-04-09 DIAGNOSIS — Z71.85 VACCINE COUNSELING: ICD-10-CM

## 2025-04-09 DIAGNOSIS — N18.2 CKD (CHRONIC KIDNEY DISEASE) STAGE 2, GFR 60-89 ML/MIN: ICD-10-CM

## 2025-04-09 DIAGNOSIS — M81.0 AGE-RELATED OSTEOPOROSIS WITHOUT CURRENT PATHOLOGICAL FRACTURE: ICD-10-CM

## 2025-04-09 DIAGNOSIS — R73.9 ELEVATED RANDOM BLOOD GLUCOSE LEVEL: ICD-10-CM

## 2025-04-09 DIAGNOSIS — K59.09 INTERMITTENT CONSTIPATION: ICD-10-CM

## 2025-04-09 DIAGNOSIS — E53.8 VITAMIN B12 DEFICIENCY: ICD-10-CM

## 2025-04-09 DIAGNOSIS — I10 BENIGN ESSENTIAL HYPERTENSION: ICD-10-CM

## 2025-04-09 DIAGNOSIS — Z23 NEED FOR TDAP VACCINATION: ICD-10-CM

## 2025-04-09 DIAGNOSIS — E78.49 FAMILIAL HYPERLIPIDEMIA: ICD-10-CM

## 2025-04-09 DIAGNOSIS — Z12.11 SCREEN FOR COLON CANCER: ICD-10-CM

## 2025-04-09 DIAGNOSIS — Z00.00 MEDICARE ANNUAL WELLNESS VISIT, SUBSEQUENT: Primary | ICD-10-CM

## 2025-04-09 DIAGNOSIS — E55.9 VITAMIN D DEFICIENCY: ICD-10-CM

## 2025-04-09 DIAGNOSIS — I89.0 LYMPHEDEMA OF BOTH LOWER EXTREMITIES: ICD-10-CM

## 2025-04-09 LAB
ALBUMIN SERPL BCG-MCNC: 3.9 G/DL (ref 3.5–5.2)
ALP SERPL-CCNC: 59 U/L (ref 40–150)
ALT SERPL W P-5'-P-CCNC: 12 U/L (ref 0–50)
ANION GAP SERPL CALCULATED.3IONS-SCNC: 8 MMOL/L (ref 7–15)
AST SERPL W P-5'-P-CCNC: 18 U/L (ref 0–45)
BASOPHILS # BLD AUTO: 0 10E3/UL (ref 0–0.2)
BASOPHILS NFR BLD AUTO: 1 %
BILIRUB SERPL-MCNC: 0.5 MG/DL
BUN SERPL-MCNC: 11.7 MG/DL (ref 8–23)
CALCIUM SERPL-MCNC: 9.2 MG/DL (ref 8.8–10.4)
CHLORIDE SERPL-SCNC: 106 MMOL/L (ref 98–107)
CHOLEST SERPL-MCNC: 161 MG/DL
CREAT SERPL-MCNC: 0.78 MG/DL (ref 0.51–0.95)
EGFRCR SERPLBLD CKD-EPI 2021: 82 ML/MIN/1.73M2
EOSINOPHIL # BLD AUTO: 0.1 10E3/UL (ref 0–0.7)
EOSINOPHIL NFR BLD AUTO: 2 %
ERYTHROCYTE [DISTWIDTH] IN BLOOD BY AUTOMATED COUNT: 13.2 % (ref 10–15)
EST. AVERAGE GLUCOSE BLD GHB EST-MCNC: 105 MG/DL
FASTING STATUS PATIENT QL REPORTED: YES
FASTING STATUS PATIENT QL REPORTED: YES
GLUCOSE SERPL-MCNC: 94 MG/DL (ref 70–99)
HBA1C MFR BLD: 5.3 %
HCO3 SERPL-SCNC: 27 MMOL/L (ref 22–29)
HCT VFR BLD AUTO: 38.6 % (ref 35–47)
HDLC SERPL-MCNC: 62 MG/DL
HGB BLD-MCNC: 12.8 G/DL (ref 11.7–15.7)
IMM GRANULOCYTES # BLD: 0 10E3/UL
IMM GRANULOCYTES NFR BLD: 0 %
LDLC SERPL CALC-MCNC: 76 MG/DL
LYMPHOCYTES # BLD AUTO: 2.5 10E3/UL (ref 0.8–5.3)
LYMPHOCYTES NFR BLD AUTO: 35 %
MCH RBC QN AUTO: 29.6 PG (ref 26.5–33)
MCHC RBC AUTO-ENTMCNC: 33.2 G/DL (ref 31.5–36.5)
MCV RBC AUTO: 89 FL (ref 78–100)
MONOCYTES # BLD AUTO: 0.7 10E3/UL (ref 0–1.3)
MONOCYTES NFR BLD AUTO: 9 %
NEUTROPHILS # BLD AUTO: 3.8 10E3/UL (ref 1.6–8.3)
NEUTROPHILS NFR BLD AUTO: 53 %
NONHDLC SERPL-MCNC: 99 MG/DL
NRBC # BLD AUTO: 0 10E3/UL
NRBC BLD AUTO-RTO: 0 /100
PLATELET # BLD AUTO: 226 10E3/UL (ref 150–450)
POTASSIUM SERPL-SCNC: 4.7 MMOL/L (ref 3.4–5.3)
PROT SERPL-MCNC: 6.3 G/DL (ref 6.4–8.3)
RBC # BLD AUTO: 4.33 10E6/UL (ref 3.8–5.2)
SODIUM SERPL-SCNC: 141 MMOL/L (ref 135–145)
TRIGL SERPL-MCNC: 117 MG/DL
WBC # BLD AUTO: 7.2 10E3/UL (ref 4–11)

## 2025-04-09 PROCEDURE — 36415 COLL VENOUS BLD VENIPUNCTURE: CPT | Mod: ZL | Performed by: INTERNAL MEDICINE

## 2025-04-09 PROCEDURE — 80061 LIPID PANEL: CPT | Mod: ZL | Performed by: INTERNAL MEDICINE

## 2025-04-09 PROCEDURE — 85014 HEMATOCRIT: CPT | Mod: ZL | Performed by: INTERNAL MEDICINE

## 2025-04-09 PROCEDURE — 82435 ASSAY OF BLOOD CHLORIDE: CPT | Mod: ZL | Performed by: INTERNAL MEDICINE

## 2025-04-09 PROCEDURE — 84155 ASSAY OF PROTEIN SERUM: CPT | Mod: ZL | Performed by: INTERNAL MEDICINE

## 2025-04-09 PROCEDURE — 83036 HEMOGLOBIN GLYCOSYLATED A1C: CPT | Mod: ZL | Performed by: INTERNAL MEDICINE

## 2025-04-09 PROCEDURE — 85004 AUTOMATED DIFF WBC COUNT: CPT | Mod: ZL | Performed by: INTERNAL MEDICINE

## 2025-04-09 PROCEDURE — 82465 ASSAY BLD/SERUM CHOLESTEROL: CPT | Mod: ZL | Performed by: INTERNAL MEDICINE

## 2025-04-09 PROCEDURE — G0463 HOSPITAL OUTPT CLINIC VISIT: HCPCS

## 2025-04-09 RX ORDER — POLYETHYLENE GLYCOL 3350 17 G/17G
1 POWDER, FOR SOLUTION ORAL 2 TIMES DAILY
Qty: 850 G | Refills: 11 | Status: SHIPPED | OUTPATIENT
Start: 2025-04-09

## 2025-04-09 RX ORDER — LISINOPRIL 20 MG/1
20 TABLET ORAL 2 TIMES DAILY
Qty: 180 TABLET | Refills: 4 | Status: SHIPPED | OUTPATIENT
Start: 2025-04-09

## 2025-04-09 RX ORDER — CHOLECALCIFEROL (VITAMIN D3) 50 MCG
2 TABLET ORAL EVERY OTHER DAY
Qty: 180 TABLET | Refills: 4 | Status: SHIPPED | OUTPATIENT
Start: 2025-04-09

## 2025-04-09 RX ORDER — CYANOCOBALAMIN (VITAMIN B-12) 2500 MCG
2500 TABLET, SUBLINGUAL SUBLINGUAL
Qty: 100 TABLET | Refills: 4 | Status: SHIPPED | OUTPATIENT
Start: 2025-04-09

## 2025-04-09 RX ORDER — ROSUVASTATIN CALCIUM 20 MG/1
20 TABLET, COATED ORAL DAILY
Qty: 90 TABLET | Refills: 4 | Status: SHIPPED | OUTPATIENT
Start: 2025-04-09

## 2025-04-09 RX ORDER — CARVEDILOL 6.25 MG/1
6.25 TABLET ORAL 2 TIMES DAILY WITH MEALS
Qty: 180 TABLET | Refills: 4 | Status: SHIPPED | OUTPATIENT
Start: 2025-04-09

## 2025-04-09 RX ORDER — FUROSEMIDE 20 MG/1
20 TABLET ORAL DAILY
Qty: 90 TABLET | Refills: 4 | Status: SHIPPED | OUTPATIENT
Start: 2025-04-09

## 2025-04-09 ASSESSMENT — ENCOUNTER SYMPTOMS
SINUS PRESSURE: 1
HEADACHES: 0
COUGH: 0
NERVOUS/ANXIOUS: 1
ABDOMINAL PAIN: 0
WOUND: 0
SHORTNESS OF BREATH: 0
ADENOPATHY: 0
CONFUSION: 0
CHEST TIGHTNESS: 0
HEMATURIA: 0
FATIGUE: 0
BACK PAIN: 0
CHILLS: 0
CONSTIPATION: 1
BRUISES/BLEEDS EASILY: 0
FEVER: 0

## 2025-04-09 ASSESSMENT — PAIN SCALES - GENERAL: PAINLEVEL_OUTOF10: NO PAIN (0)

## 2025-04-09 NOTE — PATIENT INSTRUCTIONS
Blood pressure is high.       Increase lisinopril up to 20 mg twice daily.    Increase carvedilol up to 6.25 mg twice daily.    Start Lasix 20 mg daily.  Adjust dose as needed.  Consider skipping doses if not necessary, consider increasing dose if needed in 20 mg is not effective.    Start MiraLAX.  Take once or twice daily.  Adjust dose as needed for constipation prevention.        Consider Allegra daily for allergies - as needed.         Immunization History   Administered Date(s) Administered    COVID-19 MONOVALENT 12+ (Pfizer) 05/27/2021, 06/17/2021    TDAP Vaccine (Boostrix) 02/19/2009        Consider:  -- Annual Flu / Influenza vaccination - Every Fall (Starting about October 1st)  -- COVID-19 Vaccination shot -- TWICE Yearly (Spring and Fall)      -- Tetanus shot updated - from one of the local pharmacies, at your convenience -- Check cost/coverage.     -- Shingles shot (2 in series) (Shingrix) - from one of the local pharmacies, at your convenience -- Check cost/coverage.     One Dose:  -- RSV vaccine for age 60+ with medical issues, and one time for all individuals age 75+ -- In the Fall (Starting about October 1st)  (If Medicare insurance - get vaccine at the Pharmacy.     Private insurance may be able to get in clinic with clinic shot nurse.)  --- Check Cost $$$      -- Pneumococcal PCV 20 shot - x1 - after age 65.  With clinic shot nurse, at your convenience.           Medications refilled.     Labs are pending.         Cologuard Patient Instructions    An order was placed for a Cologuard stool blood testing kit.  You will receive your kit in the mail. Please follow the instructions that are provided in the kit.      Reminder: Ship Cologuard test the same day or the next day to allow enough delivery time. The lab must receive your specimen within 4 days for successful testing. If not delivered in time, you may have to complete the process again.    Once you have completed the collection and have it ready  to be shipped, bring it to one of the following sites listed below. *Note: none of the sites ship out later than mid-morning. Please do not drop off Fridays, as they will not go out until Monday which will make your specimen inacceptable.     - [facility] ([address], [place] [address])      Drop off: Monday-Thursday, 8:00am-4:30pm at the [address] check-in desk      - [facility] ([address], [place] [address])   Drop off: Monday-Thursday, 8:00am-5:45pm     - [facility] ([address], [place] [address])     Drop off: Monday-Thursday, 3:00pm-5:30pm       -- If positive test and blood is found in the stool, we will need to schedule a colonoscopy.   -- If negative, no blood noted, recheck in 3 years.

## 2025-04-09 NOTE — PROGRESS NOTES
Assessment & Plan     ICD-10-CM    1. Medicare annual wellness visit, subsequent  Z00.00       2. Vaccine counseling  Z71.85       3. Benign essential hypertension  I10 lisinopril (ZESTRIL) 20 MG tablet     carvedilol (COREG) 6.25 MG tablet     CBC with Platelets & Differential     Comprehensive metabolic panel     furosemide (LASIX) 20 MG tablet     CBC with Platelets & Differential     Comprehensive metabolic panel      4. Familial hyperlipidemia  E78.49 rosuvastatin (CRESTOR) 20 MG tablet     Lipid Profile     Lipid Profile      5. Lymphedema of both lower extremities  I89.0       6. Personal history of malignant neoplasm of cervix uteri  Z85.41       7. Vitamin D deficiency  E55.9 vitamin D3 (CHOLECALCIFEROL) 50 mcg (2000 units) tablet      8. Vitamin B12 deficiency  E53.8 vitamin B-12 (CYANOCOBALAMIN) 2500 MCG sublingual tablet      9. Need for shingles vaccine  Z23       10. Need for Tdap vaccination  Z23       11. Screen for colon cancer  Z12.11 COLOGUARD(EX[employer] SCIENCES)      12. Intermittent constipation  K59.09 polyethylene glycol (MIRALAX) 17 GM/Dose powder      13. Elevated random blood glucose level  R73.9 Hemoglobin A1c     Hemoglobin A1c      14. CKD (chronic kidney disease) stage 2, GFR 60-89 ml/min  N18.2       15. Age-related osteoporosis without current pathological fracture  M81.0          Patient presents for Medicare annual wellness visit as well as follow-up multiple issues.    Blood pressure is high.  See below.  Increased dose of lisinopril, carvedilol.  Added Lasix.    Lower extremity lymphedema, bilateral.  History of malignant neoplasm of the cervix.  Has undergone radiation therapy.  Continue routine follow-up with specialty care.    Vitamin D and vitamin B12 deficiency.  Ongoing.  Continues with replacement.  States that she stopped the vitamin D supplement recently because she thought it may have been contributing to constipation.  Recommended to restart her vitamin supplementation.   Updated prescription sent to pharmacy.    Patient has osteoporosis.  Encouraged regular walking program.  Consider osteoporotic medications.    Colon cancer screening.  She would like to proceed with Cologuard.  Orders placed.  Advised if this is positive for blood, we will need to proceed with colonoscopy.  She would like to proceed with Cologuard.  Orders placed.  Advised if this is positive for blood, we will need to proceed with colonoscopy.    Intermittent constipation.  Start MiraLAX.  Prescription sent to pharmacy.    HYPERTENSION - Ongoing. Blood pressure is currently NOT well controlled.  Medication side effects: None. Denies syncope or presyncope.    Continue current medications --> with dose increase of each, plus addition of Lasix.     Medication list reviewed/updated. Refills completed as needed.      MIXED HYPERLIPIDEMIA.  Ongoing. LDL is at goal: Yes. Triglycerides are at goal: Yes.    Medication side effects reported: None.   Continue current medications for now. Medication list reviewed/updated. Refills completed as needed.  Recent Labs   Lab Test 04/09/25  0848 04/03/24  0954   CHOL 161 171   HDL 62 65   LDL 76 89   TRIG 117 87        Chronic Kidney Disease, Stage 2 (GFR 60-89), chronic, ongoing.  Kidney function had been slowly declining.  Encourage NSAID avoidance.    Recent Labs   Lab Test 04/09/25  0848 04/03/24  0954   CR 0.78 0.78   GFRESTIMATED 82 83        Vaccine counseling completed.  Encourage routine / annual vaccinations.    The longitudinal plan of care for the diagnosis(es)/condition(s) as documented were addressed during this visit. Due to the added complexity in care, I will continue to support Eloisa in the subsequent management and with ongoing continuity of care.      Results for orders placed or performed in visit on 04/09/25   Lipid Profile     Status: None   Result Value Ref Range    Cholesterol 161 <200 mg/dL    Triglycerides 117 <150 mg/dL    Direct Measure HDL 62 >=50  mg/dL    LDL Cholesterol Calculated 76 <100 mg/dL    Non HDL Cholesterol 99 <130 mg/dL    Patient Fasting > 8hrs? Yes     Narrative    Cholesterol  Desirable: < 200 mg/dL  Borderline High: 200 - 239 mg/dL  High: >= 240 mg/dL    Triglycerides  Normal: < 150 mg/dL  Borderline High: 150 - 199 mg/dL  High: 200-499 mg/dL  Very High: >= 500 mg/dL    Direct Measure HDL  Female: >= 50 mg/dL   Male: >= 40 mg/dL    LDL Cholesterol  Desirable: < 100 mg/dL  Above Desirable: 100 - 129 mg/dL   Borderline High: 130 - 159 mg/dL   High:  160 - 189 mg/dL   Very High: >= 190 mg/dL    Non HDL Cholesterol  Desirable: < 130 mg/dL  Above Desirable: 130 - 159 mg/dL  Borderline High: 160 - 189 mg/dL  High: 190 - 219 mg/dL  Very High: >= 220 mg/dL   Hemoglobin A1c     Status: Normal   Result Value Ref Range    Estimated Average Glucose 105 <117 mg/dL    Hemoglobin A1C 5.3 <5.7 %   Comprehensive metabolic panel     Status: Abnormal   Result Value Ref Range    Sodium 141 135 - 145 mmol/L    Potassium 4.7 3.4 - 5.3 mmol/L    Carbon Dioxide (CO2) 27 22 - 29 mmol/L    Anion Gap 8 7 - 15 mmol/L    Urea Nitrogen 11.7 8.0 - 23.0 mg/dL    Creatinine 0.78 0.51 - 0.95 mg/dL    GFR Estimate 82 >60 mL/min/1.73m2    Calcium 9.2 8.8 - 10.4 mg/dL    Chloride 106 98 - 107 mmol/L    Glucose 94 70 - 99 mg/dL    Alkaline Phosphatase 59 40 - 150 U/L    AST 18 0 - 45 U/L    ALT 12 0 - 50 U/L    Protein Total 6.3 (L) 6.4 - 8.3 g/dL    Albumin 3.9 3.5 - 5.2 g/dL    Bilirubin Total 0.5 <=1.2 mg/dL    Patient Fasting > 8hrs? Yes    CBC with platelets and differential     Status: None   Result Value Ref Range    WBC Count 7.2 4.0 - 11.0 10e3/uL    RBC Count 4.33 3.80 - 5.20 10e6/uL    Hemoglobin 12.8 11.7 - 15.7 g/dL    Hematocrit 38.6 35.0 - 47.0 %    MCV 89 78 - 100 fL    MCH 29.6 26.5 - 33.0 pg    MCHC 33.2 31.5 - 36.5 g/dL    RDW 13.2 10.0 - 15.0 %    Platelet Count 226 150 - 450 10e3/uL    % Neutrophils 53 %    % Lymphocytes 35 %    % Monocytes 9 %    %  "Eosinophils 2 %    % Basophils 1 %    % Immature Granulocytes 0 %    NRBCs per 100 WBC 0 <1 /100    Absolute Neutrophils 3.8 1.6 - 8.3 10e3/uL    Absolute Lymphocytes 2.5 0.8 - 5.3 10e3/uL    Absolute Monocytes 0.7 0.0 - 1.3 10e3/uL    Absolute Eosinophils 0.1 0.0 - 0.7 10e3/uL    Absolute Basophils 0.0 0.0 - 0.2 10e3/uL    Absolute Immature Granulocytes 0.0 <=0.4 10e3/uL    Absolute NRBCs 0.0 10e3/uL   CBC with Platelets & Differential     Status: None    Narrative    The following orders were created for panel order CBC with Platelets & Differential.  Procedure                               Abnormality         Status                     ---------                               -----------         ------                     CBC with platelets and ...[[id number]]                      Final result                 Please view results for these tests on the individual orders.      CBC is normal.  Metabolic panel is normal.  Hemoglobin A1c is well-controlled.  Cholesterol levels are at goal.           BMI  Estimated body mass index is 27.05 kg/m  as calculated from the following:    Height as of this encounter: 1.676 m (5' 6\").    Weight as of this encounter: 76 kg (167 lb 9.6 oz).       Counseling  Appropriate preventive services were addressed with this patient via screening, questionnaire, or discussion as appropriate for fall prevention, nutrition, physical activity, Tobacco-use cessation, social engagement, weight loss and cognition.  Checklist reviewing preventive services available has been given to the patient.  Reviewed patient's diet, addressing concerns and/or questions.   She is at risk for psychosocial distress and has been provided with information to reduce risk.         Return in about 1 year (around 4/9/2026) for Annual Medicare Wellness Visit.    Review of Systems   Constitutional:  Negative for chills, fatigue and fever.   HENT:  Positive for congestion, hearing loss (-- low tones), sinus pressure and " sneezing.    Eyes:  Negative for visual disturbance.   Respiratory:  Negative for cough, chest tightness and shortness of breath.    Cardiovascular:  Positive for peripheral edema. Negative for chest pain.   Gastrointestinal:  Positive for constipation. Negative for abdominal pain.   Endocrine:        + osteoporosis   Genitourinary:  Negative for hematuria.        Hx urterine cancer s/p radiation --> lymphedema ( Left > Right Legs).   Musculoskeletal:  Negative for back pain.   Skin:  Negative for rash and wound.   Allergic/Immunologic: Positive for environmental allergies. Negative for immunocompromised state.   Neurological:  Negative for syncope and headaches.        Hx of Post-herpetic neuralgia - left flank   Hematological:  Negative for adenopathy. Does not bruise/bleed easily.   Psychiatric/Behavioral:  Negative for confusion. The patient is nervous/anxious.        Preventive Care Visit  [facility]  Chris Fitch MD, Internal Medicine  Apr 9, 2025      Jim Amin is a 69 year old, presenting for the following:  Medicare Visit        4/9/2025     7:51 AM   Additional Questions   Roomed by LACHO Dejesus   Accompanied by Self         4/9/2025     7:51 AM   Patient Reported Additional Medications   Patient reports taking the following new medications N/A          History of Present Illness      She is taking medications regularly.           Advance Care Planning  Patient does not have a Health Care Directive: Discussed advance care planning with patient; information given to patient to review.      4/4/2025   General Health   How would you rate your overall physical health? Good   Feel stress (tense, anxious, or unable to sleep) To some extent   (!) STRESS CONCERN      4/4/2025   Nutrition   Diet: Regular (no restrictions)         4/4/2025   Exercise   Days per week of moderate/strenous exercise 0 days   (!) EXERCISE CONCERN      4/4/2025   Social Factors   Frequency of gathering  with friends or relatives Once a week   Worry food won't last until get money to buy more No   Food not last or not have enough money for food? No   Do you have housing? (Housing is defined as stable permanent housing and does not include staying ouside in a car, in a tent, in an abandoned building, in an overnight shelter, or couch-surfing.) Yes   Are you worried about losing your housing? No   Lack of transportation? No   Unable to get utilities (heat,electricity)? No         4/9/2025   Fall Risk   Reason Gait Speed Test Not Completed Unable to complete test, patient reported symptoms          4/4/2025   Activities of Daily Living- Home Safety   Needs help with the following daily activites None of the above   Safety concerns in the home None of the above         4/4/2025   Dental   Dentist two times every year? Yes         4/4/2025   Hearing Screening   Hearing concerns? None of the above         4/4/2025   Driving Risk Screening   Patient/family members have concerns about driving No         4/4/2025   General Alertness/Fatigue Screening   Have you been more tired than usual lately? No         4/4/2025   Urinary Incontinence Screening   Bothered by leaking urine in past 6 months No           3/27/2024   TB Screening   Were you born outside of the [place]? No           Today's PHQ-2 Score:       4/9/2025     6:43 AM   PHQ-2 ( 1999 Pfizer)   Q1: Little interest or pleasure in doing things 0   Q2: Feeling down, depressed or hopeless 0   PHQ-2 Score 0    Q1: Little interest or pleasure in doing things Not at all   Q2: Feeling down, depressed or hopeless Not at all   PHQ-2 Score 0       Patient-reported           4/4/2025   Substance Use   Alcohol more than 3/day or more than 7/wk Not Applicable   Do you have a current opioid prescription? No   How severe/bad is pain from 1 to 10? 5/10   Do you use any other substances recreationally? No     Social History     Tobacco Use    Smoking status: Former     Current packs/day:  0.00     Average packs/day: 0.3 packs/day for 15.0 years (3.8 ttl pk-yrs)     Types: Cigarettes     Start date: 1977     Quit date: 1992     Years since quittin.2     Passive exposure: Never    Smokeless tobacco: Never   Vaping Use    Vaping status: Never Used   Substance Use Topics    Alcohol use: No     Alcohol/week: 0.0 standard drinks of alcohol    Drug use: Not Currently           2023   LAST FHS-7 RESULTS   1st degree relative breast or ovarian cancer No   Any relative bilateral breast cancer No   Any male have breast cancer No   Any ONE woman have BOTH breast AND ovarian cancer No   Any woman with breast cancer before 50yrs No   2 or more relatives with breast AND/OR ovarian cancer No   2 or more relatives with breast AND/OR bowel cancer No        Mammogram Screening - Mammogram every 1-2 years updated in Health Maintenance based on mutual decision making      History of abnormal Pap smear: Status post hysterectomy with removal of cervix and no history of CIN2 or greater or cervical cancer. Health Maintenance and Surgical History updated.       ASCVD Risk   The 10-year ASCVD risk score (Gissell DK, et al., 2019) is: 15.2%    Values used to calculate the score:      Age: 69 years      Sex: Female      Is Non- : No      Diabetic: No      Tobacco smoker: No      Systolic Blood Pressure: 168 mmHg      Is BP treated: Yes      HDL Cholesterol: 62 mg/dL      Total Cholesterol: 161 mg/dL            Reviewed and updated as needed this visit by Provider   Tobacco  Allergies  Meds  Problems  Med Hx  Surg Hx  Fam Hx     Sexual Activity            Current providers sharing in care for this patient include:  Patient Care Team:  Chris Fitch MD as PCP - General (Internal Medicine)  Chris Fitch MD as Assigned PCP  Chris Fitch MD (Internal Medicine)    The following health maintenance items are reviewed in Epic and correct as of today:  Health Maintenance  "  Topic Date Due    Pneumococcal Vaccine: 50+ Years (1 of 1 - PCV) Never done    ZOSTER IMMUNIZATION (1 of 2) Never done    DTAP/TDAP/TD IMMUNIZATION (2 - Td or Tdap) 2019    COLORECTAL CANCER SCREENING  2023    COVID-19 Vaccine (3 -  season) 2024    MICROALBUMIN  2025    MAMMO SCREENING  2025    MEDICARE ANNUAL WELLNESS VISIT  2026    BMP  2026    LIPID  2026    FALL RISK ASSESSMENT  2026    DIABETES SCREENING  2028    ADVANCE CARE PLANNING  2030    RSV VACCINE (1 - 1-dose 75+ series) 2031    DEXA  2036    PHQ-2 (once per calendar year)  Completed    URINALYSIS  Completed    HEPATITIS C SCREENING  Addressed    HPV IMMUNIZATION  Aged Out    MENINGITIS IMMUNIZATION  Aged Out    INFLUENZA VACCINE  Discontinued            Objective    Exam  BP (!) 168/104   Pulse 69   Temp 97.8  F (36.6  C) (Temporal)   Resp 16   Ht 1.676 m (5' 6\")   Wt 76 kg (167 lb 9.6 oz)   LMP  (LMP Unknown)   SpO2 98%   BMI 27.05 kg/m     Estimated body mass index is 27.05 kg/m  as calculated from the following:    Height as of this encounter: 1.676 m (5' 6\").    Weight as of this encounter: 76 kg (167 lb 9.6 oz).    Physical Exam  Constitutional:       General: She is not in acute distress.     Appearance: She is well-developed. She is not diaphoretic.   Eyes:      General: No scleral icterus.     Conjunctiva/sclera: Conjunctivae normal.   Neck:      Vascular: No carotid bruit.   Cardiovascular:      Rate and Rhythm: Normal rate and regular rhythm.      Pulses: Normal pulses.   Pulmonary:      Effort: Pulmonary effort is normal.      Breath sounds: Normal breath sounds.   Abdominal:      Palpations: Abdomen is soft.      Tenderness: There is no abdominal tenderness.   Musculoskeletal:         General: No deformity.      Cervical back: Neck supple.      Right lower le+ Pitting Edema present.      Left lower le+ Pitting Edema present.      Comments: " + lymphedema bilateral legs   Lymphadenopathy:      Cervical: No cervical adenopathy.   Skin:     General: Skin is warm and dry.      Findings: No rash.   Neurological:      General: No focal deficit present.      Mental Status: She is alert. Mental status is at baseline.   Psychiatric:         Mood and Affect: Mood normal.         Behavior: Behavior normal.               4/9/2025   Mini Cog   Clock Draw Score 2 Normal   3 Item Recall 3 objects recalled   Mini Cog Total Score 5         Vision Screen  Reason Vision Screen Not Completed:  (Not Welcome to Medicare)      Signed Electronically by: Chris Fitch MD

## 2025-04-09 NOTE — NURSING NOTE
"Chief Complaint   Patient presents with    Medicare Visit       Initial Pulse 69   Temp 97.8  F (36.6  C) (Temporal)   Resp 16   Ht 1.676 m (5' 6\")   Wt 76 kg (167 lb 9.6 oz)   LMP  (LMP Unknown)   SpO2 98%   BMI 27.05 kg/m   Estimated body mass index is 27.05 kg/m  as calculated from the following:    Height as of this encounter: 1.676 m (5' 6\").    Weight as of this encounter: 76 kg (167 lb 9.6 oz).  Medication Reconciliation: complete          " Consults   Dictated consultation. Assessment:  1. Plantar ulceration left foot. 2. Cellulitis left foot with blistering X's left foot. 3. Likely abscess left foot. Recommended MRI of the left foot for abscess. Plan to go to surgery on Tuesday for debridement of the left foot plantar ulceration and I&D of abscess id present.

## 2025-04-10 ENCOUNTER — MYC MEDICAL ADVICE (OUTPATIENT)
Dept: INTERNAL MEDICINE | Facility: OTHER | Age: 70
End: 2025-04-10
Payer: COMMERCIAL

## 2025-04-10 ENCOUNTER — MYC REFILL (OUTPATIENT)
Dept: INTERNAL MEDICINE | Facility: OTHER | Age: 70
End: 2025-04-10
Payer: COMMERCIAL

## 2025-04-10 DIAGNOSIS — E53.8 VITAMIN B12 DEFICIENCY: ICD-10-CM

## 2025-04-10 RX ORDER — CYANOCOBALAMIN (VITAMIN B-12) 2500 MCG
2500 TABLET, SUBLINGUAL SUBLINGUAL
Qty: 100 TABLET | Refills: 4 | Status: CANCELLED | OUTPATIENT
Start: 2025-04-11

## 2025-04-11 DIAGNOSIS — E53.8 VITAMIN B12 DEFICIENCY: ICD-10-CM

## 2025-04-14 NOTE — TELEPHONE ENCOUNTER
Requested Prescriptions   Pending Prescriptions Disp Refills    vitamin B-12 (CYANOCOBALAMIN) 2500 MCG sublingual tablet 100 tablet 4     Sig: Take 1 tablet (2,500 mcg) by mouth Every Mon, Wed, Fri Morning. - for Low B12     Prescription approved 4/9/25. Pt notified via [employer]. June Hernandez RN .............. 4/14/2025  4:44 PM

## 2025-04-15 ENCOUNTER — TELEPHONE (OUTPATIENT)
Dept: INTERNAL MEDICINE | Facility: OTHER | Age: 70
End: 2025-04-15
Payer: COMMERCIAL

## 2025-04-15 DIAGNOSIS — R19.5 POSITIVE COLORECTAL CANCER SCREENING USING COLOGUARD TEST: Primary | ICD-10-CM

## 2025-04-15 DIAGNOSIS — R19.4 ENCOUNTER FOR DIAGNOSTIC COLONOSCOPY DUE TO CHANGE IN BOWEL HABITS: ICD-10-CM

## 2025-04-15 DIAGNOSIS — Z53.20 COLONOSCOPY REFUSED: ICD-10-CM

## 2025-04-15 NOTE — TELEPHONE ENCOUNTER
[facility] requesting:    vitamin B-12 (CYANOCOBALAMIN) 2500 MCG sublingual tablet 100 tablet 4 4/9/2025 -- No   Sig - Route: Take 1 tablet (2,500 mcg) by mouth Every Mon, Wed, Fri Morning. - for Low B12 - Oral   Sent to pharmacy as: Vitamin B-12 2500 MCG Sublingual Tablet Sublingual (CYANOCOBALAMIN)   Class: E-Prescribe   Order: [id number]   E-Prescribing Status: Receipt confirmed by pharmacy (4/9/2025  8:27 AM CDT)     [id number] - [place], [place] - [address]     Duplicate    Jody Moe RN on 4/15/2025 at 12:30 PM

## 2025-04-15 NOTE — TELEPHONE ENCOUNTER
Patient had positive Cologuard 4/2023.   Chart review indicates that she should probably actually proceed with colonoscopy.    Colonoscopy ordered  - they will call with date/time of appointment.        Electronically signed by:  Chris Fitch MD  on April 15, 2025 1:10 PM

## 2025-04-15 NOTE — TELEPHONE ENCOUNTER
"NEREYDA    Just spoke to patient and she states she will not be doing a colonoscopy. She also knows that cologuard will not be covered by insurance and so she does not want to complete that either. She stated \"thank you for calling, but you can let Dr. Fitch know I will not be having a colonoscopy.\"  She would like both the colonoscopy and cologuard canceled.    June Salguero on 4/15/2025 at 1:35 PM  "

## 2025-04-15 NOTE — TELEPHONE ENCOUNTER
Patient is stating tyrell has to be cancelled per company because she's not due till April 2026. Company is saying Doctor has to cancel, he has to call Tyrell Balderas on 4/15/2025 at 12:28 PM

## 2025-04-16 ENCOUNTER — ORDERS ONLY (AUTO-RELEASED) (OUTPATIENT)
Dept: INTERNAL MEDICINE | Facility: OTHER | Age: 70
End: 2025-04-16
Payer: COMMERCIAL

## 2025-04-16 DIAGNOSIS — Z12.11 SCREEN FOR COLON CANCER: ICD-10-CM

## 2025-05-01 ENCOUNTER — PATIENT OUTREACH (OUTPATIENT)
Dept: CARE COORDINATION | Facility: CLINIC | Age: 70
End: 2025-05-01
Payer: COMMERCIAL

## 2025-05-25 DIAGNOSIS — I10 BENIGN ESSENTIAL HYPERTENSION: ICD-10-CM

## 2025-05-28 RX ORDER — CARVEDILOL 6.25 MG/1
TABLET ORAL
Qty: 90 TABLET | OUTPATIENT
Start: 2025-05-28

## 2025-05-28 NOTE — TELEPHONE ENCOUNTER
[facility] sent Rx request for the following:      carvedilol (COREG) 6.25 MG tablet (Discontinued) 90 tablet 4 4/3/2024 4/7/2025 No   Sig: TAKE 1/2 TABLET(3.125 MG) BY MOUTH TWICE DAILY WITH MEALS     Pharmacy notified. Dakota Vazquez RN .............. 5/28/2025  11:24 AM

## 2025-08-17 ENCOUNTER — HEALTH MAINTENANCE LETTER (OUTPATIENT)
Age: 70
End: 2025-08-17

## 2025-08-21 ENCOUNTER — DOCUMENTATION ONLY (OUTPATIENT)
Dept: INTERNAL MEDICINE | Facility: OTHER | Age: 70
End: 2025-08-21
Payer: COMMERCIAL

## 2025-08-21 DIAGNOSIS — I89.0 LYMPHEDEMA, NOT ELSEWHERE CLASSIFIED: Primary | ICD-10-CM

## (undated) DEVICE — SUTURE 2-0 SILK C016T SH

## (undated) DEVICE — DRSG STERI STRIP 1/2X4" R1547

## (undated) DEVICE — SOL WATER 1500ML

## (undated) DEVICE — SU PDS II 0 CTX 60" Z990G

## (undated) DEVICE — SUCTION TIP YANKAUER W/O VENT K86

## (undated) DEVICE — Device

## (undated) DEVICE — GLOVE PROTEXIS POWDER FREE SMT 6.5  2D72PT65X

## (undated) DEVICE — SUCTION TIP POOLE K770

## (undated) DEVICE — SU SILK 3-0 SH CR 8X18" C013D

## (undated) DEVICE — SLEEVE COMPRESSION SCD KNEE MED 74022

## (undated) DEVICE — TRAY 16FR CATH W/URINE METER SILVER 903116

## (undated) DEVICE — PREP CHLORAPREP 26ML TINTED ORANGE  260815

## (undated) DEVICE — COVER LIGHT HANDLE LT-F02

## (undated) DEVICE — DRAPE MAYO STAND 23X54 8337

## (undated) DEVICE — ESU ELEC BLADE 2.75" COATED/INSULATED E1455

## (undated) DEVICE — DRSG MEDIPORE 3 1/2X8" 3570

## (undated) DEVICE — PACK MAJOR LAPAROTOMY LF SBA15MLFCA

## (undated) DEVICE — SPONGE LAP 18X18" 23250-400

## (undated) DEVICE — ESU PENCIL SMOKE EVAC W/ROCKER SWITCH 0703-047-000

## (undated) DEVICE — STPL SKIN SUBCUTICULAR INSORB  2030

## (undated) DEVICE — SU MONOCRYL 3-0 PS-2 18" UND Y497G

## (undated) DEVICE — ESU GROUND PAD ADULT W/CORD E7507

## (undated) RX ORDER — BUPIVACAINE HYDROCHLORIDE 2.5 MG/ML
INJECTION, SOLUTION EPIDURAL; INFILTRATION; INTRACAUDAL
Status: DISPENSED
Start: 2022-12-16

## (undated) RX ORDER — ONDANSETRON 2 MG/ML
INJECTION INTRAMUSCULAR; INTRAVENOUS
Status: DISPENSED
Start: 2022-12-16

## (undated) RX ORDER — CEFAZOLIN SODIUM/WATER 2 G/20 ML
SYRINGE (ML) INTRAVENOUS
Status: DISPENSED
Start: 2022-12-16

## (undated) RX ORDER — FENTANYL CITRATE 50 UG/ML
INJECTION, SOLUTION INTRAMUSCULAR; INTRAVENOUS
Status: DISPENSED
Start: 2022-12-16

## (undated) RX ORDER — SODIUM CHLORIDE, SODIUM LACTATE, POTASSIUM CHLORIDE, CALCIUM CHLORIDE 600; 310; 30; 20 MG/100ML; MG/100ML; MG/100ML; MG/100ML
INJECTION, SOLUTION INTRAVENOUS
Status: DISPENSED
Start: 2022-12-16

## (undated) RX ORDER — ESMOLOL HYDROCHLORIDE 10 MG/ML
INJECTION INTRAVENOUS
Status: DISPENSED
Start: 2022-12-16

## (undated) RX ORDER — KETOROLAC TROMETHAMINE 30 MG/ML
INJECTION, SOLUTION INTRAMUSCULAR; INTRAVENOUS
Status: DISPENSED
Start: 2022-12-16

## (undated) RX ORDER — ACETAMINOPHEN 10 MG/ML
INJECTION, SOLUTION INTRAVENOUS
Status: DISPENSED
Start: 2022-12-16

## (undated) RX ORDER — DEXAMETHASONE SODIUM PHOSPHATE 4 MG/ML
INJECTION, SOLUTION INTRA-ARTICULAR; INTRALESIONAL; INTRAMUSCULAR; INTRAVENOUS; SOFT TISSUE
Status: DISPENSED
Start: 2022-12-16

## (undated) RX ORDER — PROPOFOL 10 MG/ML
INJECTION, EMULSION INTRAVENOUS
Status: DISPENSED
Start: 2022-12-16

## (undated) RX ORDER — HYDRALAZINE HYDROCHLORIDE 20 MG/ML
INJECTION INTRAMUSCULAR; INTRAVENOUS
Status: DISPENSED
Start: 2022-12-16